# Patient Record
Sex: FEMALE | Race: WHITE | Employment: OTHER | URBAN - METROPOLITAN AREA
[De-identification: names, ages, dates, MRNs, and addresses within clinical notes are randomized per-mention and may not be internally consistent; named-entity substitution may affect disease eponyms.]

---

## 2017-01-06 ENCOUNTER — TELEPHONE (OUTPATIENT)
Dept: INTERNAL MEDICINE | Age: 82
End: 2017-01-06

## 2017-01-10 ENCOUNTER — OFFICE VISIT (OUTPATIENT)
Dept: CARDIOLOGY CLINIC | Age: 82
End: 2017-01-10

## 2017-01-10 VITALS
WEIGHT: 108 LBS | SYSTOLIC BLOOD PRESSURE: 120 MMHG | DIASTOLIC BLOOD PRESSURE: 50 MMHG | HEART RATE: 72 BPM | BODY MASS INDEX: 22.57 KG/M2

## 2017-01-10 DIAGNOSIS — I10 ESSENTIAL HYPERTENSION: ICD-10-CM

## 2017-01-10 DIAGNOSIS — I47.1 SVT (SUPRAVENTRICULAR TACHYCARDIA) (HCC): ICD-10-CM

## 2017-01-10 DIAGNOSIS — I35.1 NONRHEUMATIC AORTIC VALVE INSUFFICIENCY: ICD-10-CM

## 2017-01-10 DIAGNOSIS — I25.10 CAD IN NATIVE ARTERY: Primary | ICD-10-CM

## 2017-01-10 DIAGNOSIS — R00.2 PALPITATIONS: ICD-10-CM

## 2017-01-10 DIAGNOSIS — I34.0 NON-RHEUMATIC MITRAL REGURGITATION: ICD-10-CM

## 2017-01-10 PROCEDURE — 99214 OFFICE O/P EST MOD 30 MIN: CPT | Performed by: INTERNAL MEDICINE

## 2017-01-10 RX ORDER — TRAMADOL HYDROCHLORIDE 50 MG/1
50 TABLET ORAL EVERY 6 HOURS PRN
COMMUNITY
End: 2017-02-15 | Stop reason: SDUPTHER

## 2017-01-25 ENCOUNTER — CARE COORDINATION (OUTPATIENT)
Dept: INTERNAL MEDICINE | Age: 82
End: 2017-01-25

## 2017-02-03 RX ORDER — DIGOXIN 125 MCG
TABLET ORAL
Qty: 30 TABLET | Refills: 6 | Status: SHIPPED | OUTPATIENT
Start: 2017-02-03 | End: 2017-10-09 | Stop reason: SDUPTHER

## 2017-02-15 RX ORDER — TRAMADOL HYDROCHLORIDE 50 MG/1
TABLET ORAL
Qty: 60 TABLET | Refills: 0 | OUTPATIENT
Start: 2017-02-15 | End: 2017-04-24 | Stop reason: SDUPTHER

## 2017-02-28 ENCOUNTER — CARE COORDINATION (OUTPATIENT)
Dept: INTERNAL MEDICINE | Age: 82
End: 2017-02-28

## 2017-03-15 ENCOUNTER — CARE COORDINATION (OUTPATIENT)
Dept: INTERNAL MEDICINE | Age: 82
End: 2017-03-15

## 2017-03-31 RX ORDER — SIMVASTATIN 20 MG
TABLET ORAL
Qty: 30 TABLET | Refills: 3 | Status: SHIPPED | OUTPATIENT
Start: 2017-03-31 | End: 2017-09-01 | Stop reason: SDUPTHER

## 2017-04-03 ENCOUNTER — CARE COORDINATION (OUTPATIENT)
Dept: INTERNAL MEDICINE | Age: 82
End: 2017-04-03

## 2017-04-21 ENCOUNTER — OFFICE VISIT (OUTPATIENT)
Dept: INTERNAL MEDICINE | Age: 82
End: 2017-04-21

## 2017-04-21 VITALS
SYSTOLIC BLOOD PRESSURE: 130 MMHG | DIASTOLIC BLOOD PRESSURE: 68 MMHG | BODY MASS INDEX: 21.62 KG/M2 | HEART RATE: 60 BPM | WEIGHT: 103 LBS | HEIGHT: 58 IN

## 2017-04-21 DIAGNOSIS — H35.30 MACULAR DEGENERATION OF BOTH EYES: ICD-10-CM

## 2017-04-21 DIAGNOSIS — I25.10 CORONARY ARTERY DISEASE INVOLVING NATIVE CORONARY ARTERY OF NATIVE HEART WITHOUT ANGINA PECTORIS: ICD-10-CM

## 2017-04-21 DIAGNOSIS — G47.10 HYPERSOMNOLENCE: ICD-10-CM

## 2017-04-21 DIAGNOSIS — E78.5 HYPERLIPIDEMIA, UNSPECIFIED HYPERLIPIDEMIA TYPE: ICD-10-CM

## 2017-04-21 DIAGNOSIS — I10 ESSENTIAL HYPERTENSION: ICD-10-CM

## 2017-04-21 DIAGNOSIS — R53.83 FATIGUE, UNSPECIFIED TYPE: Primary | ICD-10-CM

## 2017-04-21 DIAGNOSIS — R53.83 FATIGUE, UNSPECIFIED TYPE: ICD-10-CM

## 2017-04-21 LAB
A/G RATIO: 1.9 (ref 1.1–2.2)
ALBUMIN SERPL-MCNC: 4.1 G/DL (ref 3.4–5)
ALP BLD-CCNC: 57 U/L (ref 40–129)
ALT SERPL-CCNC: 11 U/L (ref 10–40)
ANION GAP SERPL CALCULATED.3IONS-SCNC: 16 MMOL/L (ref 3–16)
AST SERPL-CCNC: 17 U/L (ref 15–37)
BASOPHILS ABSOLUTE: 0 K/UL (ref 0–0.2)
BASOPHILS RELATIVE PERCENT: 0.6 %
BILIRUB SERPL-MCNC: <0.2 MG/DL (ref 0–1)
BUN BLDV-MCNC: 17 MG/DL (ref 7–20)
CALCIUM SERPL-MCNC: 9.4 MG/DL (ref 8.3–10.6)
CHLORIDE BLD-SCNC: 100 MMOL/L (ref 99–110)
CHOLESTEROL, TOTAL: 165 MG/DL (ref 0–199)
CO2: 28 MMOL/L (ref 21–32)
CREAT SERPL-MCNC: 0.8 MG/DL (ref 0.6–1.2)
EOSINOPHILS ABSOLUTE: 0.1 K/UL (ref 0–0.6)
EOSINOPHILS RELATIVE PERCENT: 1.1 %
GFR AFRICAN AMERICAN: >60
GFR NON-AFRICAN AMERICAN: >60
GLOBULIN: 2.2 G/DL
GLUCOSE BLD-MCNC: 85 MG/DL (ref 70–99)
HCT VFR BLD CALC: 36.1 % (ref 36–48)
HDLC SERPL-MCNC: 60 MG/DL (ref 40–60)
HEMOGLOBIN: 11.9 G/DL (ref 12–16)
LDL CHOLESTEROL CALCULATED: 64 MG/DL
LYMPHOCYTES ABSOLUTE: 0.8 K/UL (ref 1–5.1)
LYMPHOCYTES RELATIVE PERCENT: 14 %
MCH RBC QN AUTO: 30.4 PG (ref 26–34)
MCHC RBC AUTO-ENTMCNC: 33.1 G/DL (ref 31–36)
MCV RBC AUTO: 91.8 FL (ref 80–100)
MONOCYTES ABSOLUTE: 0.5 K/UL (ref 0–1.3)
MONOCYTES RELATIVE PERCENT: 8.5 %
NEUTROPHILS ABSOLUTE: 4.5 K/UL (ref 1.7–7.7)
NEUTROPHILS RELATIVE PERCENT: 75.8 %
PDW BLD-RTO: 13.8 % (ref 12.4–15.4)
PLATELET # BLD: 245 K/UL (ref 135–450)
PMV BLD AUTO: 9.1 FL (ref 5–10.5)
POTASSIUM SERPL-SCNC: 4.7 MMOL/L (ref 3.5–5.1)
RBC # BLD: 3.93 M/UL (ref 4–5.2)
SODIUM BLD-SCNC: 144 MMOL/L (ref 136–145)
TOTAL PROTEIN: 6.3 G/DL (ref 6.4–8.2)
TRIGL SERPL-MCNC: 207 MG/DL (ref 0–150)
TSH REFLEX FT4: 2 UIU/ML (ref 0.27–4.2)
VLDLC SERPL CALC-MCNC: 41 MG/DL
WBC # BLD: 6 K/UL (ref 4–11)

## 2017-04-21 PROCEDURE — 99214 OFFICE O/P EST MOD 30 MIN: CPT | Performed by: INTERNAL MEDICINE

## 2017-04-21 PROCEDURE — 1090F PRES/ABSN URINE INCON ASSESS: CPT | Performed by: INTERNAL MEDICINE

## 2017-04-21 PROCEDURE — G8419 CALC BMI OUT NRM PARAM NOF/U: HCPCS | Performed by: INTERNAL MEDICINE

## 2017-04-21 PROCEDURE — 4040F PNEUMOC VAC/ADMIN/RCVD: CPT | Performed by: INTERNAL MEDICINE

## 2017-04-21 PROCEDURE — 1123F ACP DISCUSS/DSCN MKR DOCD: CPT | Performed by: INTERNAL MEDICINE

## 2017-04-21 PROCEDURE — G8598 ASA/ANTIPLAT THER USED: HCPCS | Performed by: INTERNAL MEDICINE

## 2017-04-21 PROCEDURE — 1036F TOBACCO NON-USER: CPT | Performed by: INTERNAL MEDICINE

## 2017-04-21 PROCEDURE — G8427 DOCREV CUR MEDS BY ELIG CLIN: HCPCS | Performed by: INTERNAL MEDICINE

## 2017-04-21 ASSESSMENT — ENCOUNTER SYMPTOMS
RESPIRATORY NEGATIVE: 1
BACK PAIN: 0

## 2017-04-24 RX ORDER — TRAMADOL HYDROCHLORIDE 50 MG/1
TABLET ORAL
Qty: 60 TABLET | Refills: 0 | OUTPATIENT
Start: 2017-04-24 | End: 2017-06-29 | Stop reason: SDUPTHER

## 2017-05-10 ENCOUNTER — TELEPHONE (OUTPATIENT)
Dept: INTERNAL MEDICINE | Age: 82
End: 2017-05-10

## 2017-06-14 ENCOUNTER — CARE COORDINATION (OUTPATIENT)
Dept: INTERNAL MEDICINE | Age: 82
End: 2017-06-14

## 2017-06-19 ENCOUNTER — TELEPHONE (OUTPATIENT)
Dept: INTERNAL MEDICINE | Age: 82
End: 2017-06-19

## 2017-06-20 RX ORDER — BENAZEPRIL HYDROCHLORIDE 10 MG/1
TABLET ORAL
Qty: 90 TABLET | Refills: 0 | Status: SHIPPED | OUTPATIENT
Start: 2017-06-20 | End: 2017-12-26 | Stop reason: SDUPTHER

## 2017-06-30 RX ORDER — TRAMADOL HYDROCHLORIDE 50 MG/1
TABLET ORAL
Qty: 60 TABLET | Refills: 0 | OUTPATIENT
Start: 2017-06-30 | End: 2017-09-01 | Stop reason: SDUPTHER

## 2017-07-10 ENCOUNTER — OFFICE VISIT (OUTPATIENT)
Dept: ORTHOPEDIC SURGERY | Age: 82
End: 2017-07-10

## 2017-07-10 VITALS
WEIGHT: 102 LBS | DIASTOLIC BLOOD PRESSURE: 66 MMHG | BODY MASS INDEX: 23.61 KG/M2 | HEIGHT: 55 IN | SYSTOLIC BLOOD PRESSURE: 135 MMHG

## 2017-07-10 DIAGNOSIS — M65.331 TRIGGER MIDDLE FINGER OF RIGHT HAND: ICD-10-CM

## 2017-07-10 DIAGNOSIS — M79.644 FINGER PAIN, RIGHT: Primary | ICD-10-CM

## 2017-07-10 PROCEDURE — 73140 X-RAY EXAM OF FINGER(S): CPT | Performed by: ORTHOPAEDIC SURGERY

## 2017-07-10 PROCEDURE — 4040F PNEUMOC VAC/ADMIN/RCVD: CPT | Performed by: ORTHOPAEDIC SURGERY

## 2017-07-10 PROCEDURE — G8419 CALC BMI OUT NRM PARAM NOF/U: HCPCS | Performed by: ORTHOPAEDIC SURGERY

## 2017-07-10 PROCEDURE — 1090F PRES/ABSN URINE INCON ASSESS: CPT | Performed by: ORTHOPAEDIC SURGERY

## 2017-07-10 PROCEDURE — G8598 ASA/ANTIPLAT THER USED: HCPCS | Performed by: ORTHOPAEDIC SURGERY

## 2017-07-10 PROCEDURE — 20550 NJX 1 TENDON SHEATH/LIGAMENT: CPT | Performed by: ORTHOPAEDIC SURGERY

## 2017-07-10 PROCEDURE — 1123F ACP DISCUSS/DSCN MKR DOCD: CPT | Performed by: ORTHOPAEDIC SURGERY

## 2017-07-10 PROCEDURE — G8427 DOCREV CUR MEDS BY ELIG CLIN: HCPCS | Performed by: ORTHOPAEDIC SURGERY

## 2017-07-10 PROCEDURE — 99203 OFFICE O/P NEW LOW 30 MIN: CPT | Performed by: ORTHOPAEDIC SURGERY

## 2017-07-10 PROCEDURE — 1036F TOBACCO NON-USER: CPT | Performed by: ORTHOPAEDIC SURGERY

## 2017-07-12 ENCOUNTER — TELEPHONE (OUTPATIENT)
Dept: INTERNAL MEDICINE | Age: 82
End: 2017-07-12

## 2017-07-17 ENCOUNTER — OFFICE VISIT (OUTPATIENT)
Dept: INTERNAL MEDICINE | Age: 82
End: 2017-07-17

## 2017-07-17 VITALS
SYSTOLIC BLOOD PRESSURE: 130 MMHG | WEIGHT: 101 LBS | HEART RATE: 56 BPM | BODY MASS INDEX: 23.37 KG/M2 | DIASTOLIC BLOOD PRESSURE: 60 MMHG | HEIGHT: 55 IN

## 2017-07-17 DIAGNOSIS — I10 ESSENTIAL HYPERTENSION: ICD-10-CM

## 2017-07-17 DIAGNOSIS — G47.10 HYPERSOMNOLENCE: ICD-10-CM

## 2017-07-17 DIAGNOSIS — H35.30 MACULAR DEGENERATION OF BOTH EYES: ICD-10-CM

## 2017-07-17 DIAGNOSIS — I25.10 CORONARY ARTERY DISEASE INVOLVING NATIVE CORONARY ARTERY OF NATIVE HEART WITHOUT ANGINA PECTORIS: ICD-10-CM

## 2017-07-17 DIAGNOSIS — R41.3 MEMORY DIFFICULTIES: ICD-10-CM

## 2017-07-17 PROCEDURE — G8427 DOCREV CUR MEDS BY ELIG CLIN: HCPCS | Performed by: INTERNAL MEDICINE

## 2017-07-17 PROCEDURE — 1036F TOBACCO NON-USER: CPT | Performed by: INTERNAL MEDICINE

## 2017-07-17 PROCEDURE — 1090F PRES/ABSN URINE INCON ASSESS: CPT | Performed by: INTERNAL MEDICINE

## 2017-07-17 PROCEDURE — G8598 ASA/ANTIPLAT THER USED: HCPCS | Performed by: INTERNAL MEDICINE

## 2017-07-17 PROCEDURE — G8417 CALC BMI ABV UP PARAM F/U: HCPCS | Performed by: INTERNAL MEDICINE

## 2017-07-17 PROCEDURE — 1123F ACP DISCUSS/DSCN MKR DOCD: CPT | Performed by: INTERNAL MEDICINE

## 2017-07-17 PROCEDURE — 4040F PNEUMOC VAC/ADMIN/RCVD: CPT | Performed by: INTERNAL MEDICINE

## 2017-07-17 PROCEDURE — 99215 OFFICE O/P EST HI 40 MIN: CPT | Performed by: INTERNAL MEDICINE

## 2017-07-17 RX ORDER — DONEPEZIL HYDROCHLORIDE 10 MG/1
10 TABLET, FILM COATED ORAL NIGHTLY
Qty: 30 TABLET | Refills: 5 | Status: SHIPPED | OUTPATIENT
Start: 2017-07-17 | End: 2017-07-17 | Stop reason: SDUPTHER

## 2017-07-17 RX ORDER — DONEPEZIL HYDROCHLORIDE 10 MG/1
10 TABLET, FILM COATED ORAL NIGHTLY
Qty: 30 TABLET | Refills: 5 | Status: SHIPPED | OUTPATIENT
Start: 2017-07-17 | End: 2018-07-02

## 2017-07-17 ASSESSMENT — ENCOUNTER SYMPTOMS
SHORTNESS OF BREATH: 0
TROUBLE SWALLOWING: 0
GASTROINTESTINAL NEGATIVE: 1

## 2017-07-21 ENCOUNTER — OFFICE VISIT (OUTPATIENT)
Dept: CARDIOLOGY CLINIC | Age: 82
End: 2017-07-21

## 2017-07-21 VITALS
BODY MASS INDEX: 31.13 KG/M2 | DIASTOLIC BLOOD PRESSURE: 50 MMHG | WEIGHT: 102 LBS | HEART RATE: 56 BPM | SYSTOLIC BLOOD PRESSURE: 110 MMHG

## 2017-07-21 DIAGNOSIS — I34.0 NON-RHEUMATIC MITRAL REGURGITATION: ICD-10-CM

## 2017-07-21 DIAGNOSIS — I10 ESSENTIAL HYPERTENSION: ICD-10-CM

## 2017-07-21 DIAGNOSIS — I47.1 SVT (SUPRAVENTRICULAR TACHYCARDIA) (HCC): ICD-10-CM

## 2017-07-21 DIAGNOSIS — I25.10 CAD IN NATIVE ARTERY: Primary | ICD-10-CM

## 2017-07-21 DIAGNOSIS — I35.1 NONRHEUMATIC AORTIC VALVE INSUFFICIENCY: ICD-10-CM

## 2017-07-21 DIAGNOSIS — R00.2 PALPITATIONS: ICD-10-CM

## 2017-07-21 PROCEDURE — 1036F TOBACCO NON-USER: CPT | Performed by: INTERNAL MEDICINE

## 2017-07-21 PROCEDURE — 4040F PNEUMOC VAC/ADMIN/RCVD: CPT | Performed by: INTERNAL MEDICINE

## 2017-07-21 PROCEDURE — 99214 OFFICE O/P EST MOD 30 MIN: CPT | Performed by: INTERNAL MEDICINE

## 2017-07-21 PROCEDURE — 1123F ACP DISCUSS/DSCN MKR DOCD: CPT | Performed by: INTERNAL MEDICINE

## 2017-07-21 PROCEDURE — 1090F PRES/ABSN URINE INCON ASSESS: CPT | Performed by: INTERNAL MEDICINE

## 2017-07-21 PROCEDURE — G8417 CALC BMI ABV UP PARAM F/U: HCPCS | Performed by: INTERNAL MEDICINE

## 2017-07-21 PROCEDURE — G8427 DOCREV CUR MEDS BY ELIG CLIN: HCPCS | Performed by: INTERNAL MEDICINE

## 2017-07-21 PROCEDURE — G8598 ASA/ANTIPLAT THER USED: HCPCS | Performed by: INTERNAL MEDICINE

## 2017-09-02 RX ORDER — SIMVASTATIN 20 MG
TABLET ORAL
Qty: 30 TABLET | Refills: 5 | Status: SHIPPED | OUTPATIENT
Start: 2017-09-02 | End: 2017-10-06 | Stop reason: SDUPTHER

## 2017-09-05 RX ORDER — TRAMADOL HYDROCHLORIDE 50 MG/1
TABLET ORAL
Qty: 60 TABLET | Refills: 0 | OUTPATIENT
Start: 2017-09-05 | End: 2017-11-09 | Stop reason: SDUPTHER

## 2017-09-29 ENCOUNTER — OFFICE VISIT (OUTPATIENT)
Dept: INTERNAL MEDICINE | Age: 82
End: 2017-09-29

## 2017-09-29 VITALS
DIASTOLIC BLOOD PRESSURE: 60 MMHG | BODY MASS INDEX: 24.07 KG/M2 | SYSTOLIC BLOOD PRESSURE: 118 MMHG | HEART RATE: 66 BPM | OXYGEN SATURATION: 95 % | WEIGHT: 104 LBS | HEIGHT: 55 IN

## 2017-09-29 DIAGNOSIS — I10 ESSENTIAL HYPERTENSION: ICD-10-CM

## 2017-09-29 DIAGNOSIS — H35.30 MACULAR DEGENERATION OF BOTH EYES: ICD-10-CM

## 2017-09-29 DIAGNOSIS — R41.3 MEMORY DIFFICULTIES: ICD-10-CM

## 2017-09-29 PROCEDURE — G8427 DOCREV CUR MEDS BY ELIG CLIN: HCPCS | Performed by: INTERNAL MEDICINE

## 2017-09-29 PROCEDURE — G8417 CALC BMI ABV UP PARAM F/U: HCPCS | Performed by: INTERNAL MEDICINE

## 2017-09-29 PROCEDURE — G8598 ASA/ANTIPLAT THER USED: HCPCS | Performed by: INTERNAL MEDICINE

## 2017-09-29 PROCEDURE — 4040F PNEUMOC VAC/ADMIN/RCVD: CPT | Performed by: INTERNAL MEDICINE

## 2017-09-29 PROCEDURE — 1123F ACP DISCUSS/DSCN MKR DOCD: CPT | Performed by: INTERNAL MEDICINE

## 2017-09-29 PROCEDURE — 1036F TOBACCO NON-USER: CPT | Performed by: INTERNAL MEDICINE

## 2017-09-29 PROCEDURE — 99214 OFFICE O/P EST MOD 30 MIN: CPT | Performed by: INTERNAL MEDICINE

## 2017-09-29 PROCEDURE — 1090F PRES/ABSN URINE INCON ASSESS: CPT | Performed by: INTERNAL MEDICINE

## 2017-09-29 ASSESSMENT — ENCOUNTER SYMPTOMS
GASTROINTESTINAL NEGATIVE: 1
RESPIRATORY NEGATIVE: 1

## 2017-09-29 ASSESSMENT — PATIENT HEALTH QUESTIONNAIRE - PHQ9
SUM OF ALL RESPONSES TO PHQ QUESTIONS 1-9: 0
SUM OF ALL RESPONSES TO PHQ9 QUESTIONS 1 & 2: 0
1. LITTLE INTEREST OR PLEASURE IN DOING THINGS: 0
2. FEELING DOWN, DEPRESSED OR HOPELESS: 0

## 2017-10-12 RX ORDER — DIGOXIN 125 MCG
TABLET ORAL
Qty: 30 TABLET | Refills: 6 | Status: ON HOLD | OUTPATIENT
Start: 2017-10-12 | End: 2018-02-09 | Stop reason: HOSPADM

## 2017-11-10 RX ORDER — TRAMADOL HYDROCHLORIDE 50 MG/1
TABLET ORAL
Qty: 60 TABLET | Refills: 0 | OUTPATIENT
Start: 2017-11-10 | End: 2018-01-18 | Stop reason: SDUPTHER

## 2017-12-11 ENCOUNTER — HOSPITAL ENCOUNTER (OUTPATIENT)
Dept: ULTRASOUND IMAGING | Age: 82
Discharge: HOME | End: 2017-12-11
Attending: UROLOGY | Admitting: UROLOGY

## 2017-12-11 DIAGNOSIS — N23 RENAL COLIC: ICD-10-CM

## 2017-12-26 RX ORDER — BENAZEPRIL HYDROCHLORIDE 10 MG/1
TABLET ORAL
Qty: 90 TABLET | Refills: 0 | Status: SHIPPED | OUTPATIENT
Start: 2017-12-26 | End: 2018-03-27 | Stop reason: SDUPTHER

## 2018-01-18 DIAGNOSIS — M41.9 SCOLIOSIS OF THORACOLUMBAR SPINE, UNSPECIFIED SCOLIOSIS TYPE: Primary | ICD-10-CM

## 2018-01-19 RX ORDER — TRAMADOL HYDROCHLORIDE 50 MG/1
TABLET ORAL
Qty: 60 TABLET | Refills: 0 | Status: ON HOLD | OUTPATIENT
Start: 2018-01-19 | End: 2018-02-09 | Stop reason: HOSPADM

## 2018-02-08 PROBLEM — I21.4 NSTEMI (NON-ST ELEVATED MYOCARDIAL INFARCTION) (HCC): Status: ACTIVE | Noted: 2018-02-08

## 2018-02-09 ENCOUNTER — TELEPHONE (OUTPATIENT)
Dept: CARDIOLOGY CLINIC | Age: 83
End: 2018-02-09

## 2018-02-09 ENCOUNTER — TELEPHONE (OUTPATIENT)
Dept: INTERNAL MEDICINE | Age: 83
End: 2018-02-09

## 2018-02-09 DIAGNOSIS — I21.4 NSTEMI (NON-ST ELEVATED MYOCARDIAL INFARCTION) (HCC): Primary | ICD-10-CM

## 2018-02-09 NOTE — TELEPHONE ENCOUNTER
[facility] needs orders sent over for Home Healthcare. Pt was just D/C today from recent MI. Per [facility], pt would benefit from PT, med reconciliation, and disease process. Please call [phone] and/o fax orders to [phone]. Please advise.

## 2018-02-09 NOTE — TELEPHONE ENCOUNTER
Joyce Vera calling  From [facility] and can be reached at   Requesting to get a transitional Care f/u due to pt being d/c  2.9.18 with Yasemin Barnes MD pt needs to be seen Mon ,thursday and Friday afternoon due to transportation next week   Aware that Yasemin Barnes MD out however, message will be sent   Please call pt at  [phone]

## 2018-02-10 ENCOUNTER — CARE COORDINATION (OUTPATIENT)
Dept: CASE MANAGEMENT | Age: 83
End: 2018-02-10

## 2018-02-10 NOTE — CARE COORDINATION
Pharmacy delivered her meds yesterday and her private duty aide, Nano Marquez, has spoken to [employer] and is coming over to help get her meds organized. She commented about having a medbox to use now and that Nano Marquez will set it up for her. She has not taken any meds yet today until organized. Asked if Nano Marquez can call CTN when she arrives, but she ws still in bed and couldn't write down the phone number which is different than the number CTN calling from. Noted pt has orders for [facility]. Pt knows Saiaron Reny from [facility] and thought she would be out on Monday. CTN called [facility]. Spoke with Antionette. She said nurse is scheduled to see pt today. Advised she needs help with her meds. CTN called PCP office to schedule f/u appt yesterday but pt PCP and MA were out yesterday and message was left for them to contact pt on Monday for f/u appt. Pt agreeable to care [facility] calls. Follow Up  Future Appointments  Date Time Provider Lucille Herrera   3/28/2018 1:45 PM Jose Jennings MD House Card [facility] Nadeem Chisholm [place] Transition Coordinator  [phone]  [email or web address]

## 2018-02-12 ENCOUNTER — TELEPHONE (OUTPATIENT)
Dept: INTERNAL MEDICINE | Age: 83
End: 2018-02-12

## 2018-02-13 ENCOUNTER — CARE COORDINATION (OUTPATIENT)
Dept: CASE MANAGEMENT | Age: 83
End: 2018-02-13

## 2018-02-15 ENCOUNTER — TELEPHONE (OUTPATIENT)
Dept: INTERNAL MEDICINE | Age: 83
End: 2018-02-15

## 2018-02-16 RX ORDER — FLUCONAZOLE 100 MG/1
TABLET ORAL
Qty: 5 TABLET | Refills: 0 | Status: SHIPPED | OUTPATIENT
Start: 2018-02-16 | End: 2018-03-06

## 2018-02-19 ENCOUNTER — CARE COORDINATION (OUTPATIENT)
Dept: CASE MANAGEMENT | Age: 83
End: 2018-02-19

## 2018-02-22 ENCOUNTER — OFFICE VISIT (OUTPATIENT)
Dept: CARDIOLOGY CLINIC | Age: 83
End: 2018-02-22

## 2018-02-22 VITALS
DIASTOLIC BLOOD PRESSURE: 58 MMHG | WEIGHT: 97.6 LBS | BODY MASS INDEX: 29.78 KG/M2 | SYSTOLIC BLOOD PRESSURE: 104 MMHG | HEART RATE: 70 BPM

## 2018-02-22 DIAGNOSIS — I10 ESSENTIAL HYPERTENSION: ICD-10-CM

## 2018-02-22 DIAGNOSIS — I21.4 NSTEMI (NON-ST ELEVATED MYOCARDIAL INFARCTION) (HCC): ICD-10-CM

## 2018-02-22 DIAGNOSIS — I25.10 CORONARY ARTERY DISEASE INVOLVING NATIVE CORONARY ARTERY OF NATIVE HEART WITHOUT ANGINA PECTORIS: Primary | ICD-10-CM

## 2018-02-22 DIAGNOSIS — R00.1 SINUS BRADYCARDIA: ICD-10-CM

## 2018-02-22 DIAGNOSIS — R53.83 FATIGUE, UNSPECIFIED TYPE: ICD-10-CM

## 2018-02-22 PROCEDURE — 1123F ACP DISCUSS/DSCN MKR DOCD: CPT | Performed by: NURSE PRACTITIONER

## 2018-02-22 PROCEDURE — G8427 DOCREV CUR MEDS BY ELIG CLIN: HCPCS | Performed by: NURSE PRACTITIONER

## 2018-02-22 PROCEDURE — 4040F PNEUMOC VAC/ADMIN/RCVD: CPT | Performed by: NURSE PRACTITIONER

## 2018-02-22 PROCEDURE — 1036F TOBACCO NON-USER: CPT | Performed by: NURSE PRACTITIONER

## 2018-02-22 PROCEDURE — G8417 CALC BMI ABV UP PARAM F/U: HCPCS | Performed by: NURSE PRACTITIONER

## 2018-02-22 PROCEDURE — 1090F PRES/ABSN URINE INCON ASSESS: CPT | Performed by: NURSE PRACTITIONER

## 2018-02-22 PROCEDURE — 99213 OFFICE O/P EST LOW 20 MIN: CPT | Performed by: NURSE PRACTITIONER

## 2018-02-22 PROCEDURE — G8598 ASA/ANTIPLAT THER USED: HCPCS | Performed by: NURSE PRACTITIONER

## 2018-02-22 PROCEDURE — G8484 FLU IMMUNIZE NO ADMIN: HCPCS | Performed by: NURSE PRACTITIONER

## 2018-02-22 PROCEDURE — 1111F DSCHRG MED/CURRENT MED MERGE: CPT | Performed by: NURSE PRACTITIONER

## 2018-02-22 ASSESSMENT — ENCOUNTER SYMPTOMS
SHORTNESS OF BREATH: 1
COUGH: 0
GASTROINTESTINAL NEGATIVE: 1
WHEEZING: 0

## 2018-02-22 NOTE — PATIENT INSTRUCTIONS
1. Medications: continue current medications  2. Follow up: 6 months with Dr. Milena Mahajan  Patient Education        Angina: Care Instructions  Your Care Instructions    You have a problem called angina. Angina happens when there is not enough blood flow to your heart muscle. Angina is a sign of coronary artery disease (CAD). CAD occurs when blood vessels that supply the heart become narrowed. Having CAD increases your risk of a heart attack. Chest pain or pressure is the most common symptom of angina. But some people have other symptoms, like:  Pain, pressure, or a strange feeling in the back, neck, jaw, or upper belly, or in one or both shoulders or arms. Shortness of breath. Nausea or vomiting. Lightheadedness or sudden weakness. Fast or irregular heartbeat. Women are somewhat more likely than men to have angina symptoms like shortness of breath, nausea, and back or jaw pain. Angina can be dangerous. That's why it is important to pay attention to your symptoms. Know what is typical for you, learn how to control your symptoms, and understand when you need to get treatment. A change in your usual pattern of symptoms is an emergency. It may mean that you are having a heart attack. The doctor has checked you carefully, but problems can develop later. If you notice any problems or new symptoms, get medical treatment right away. Follow-up care is a key part of your treatment and safety. Be sure to make and go to all appointments, and call your doctor if you are having problems. It's also a good idea to know your test results and keep a list of the medicines you take. How can you care for yourself at home? Medicines  ? If your doctor has given you nitroglycerin for angina symptoms, keep it with you at all times. If you have symptoms, sit down and rest, and take the first dose of nitroglycerin as directed. If your symptoms get worse or are not getting better within 5 minutes, call 911 right away. Stay on the phone. changes in your health, and be sure to contact your doctor if you have any problems. Where can you learn more? Go to [email or web address] and sign in to your [employer] account. Enter H129 in the Varolii box to learn more about \"Angina: Care Instructions. \"     If you do not have an account, please click on the \"Sign Up Now\" link. Current as of: September 21, 2016  Content Version: 11.5  © 5131-2421 [employer]wise, Incorporated. Care instructions adapted under license by [facility] ([facility]). If you have questions about a medical condition or this instruction, always ask your healthcare professional. [address] any warranty or liability for your use of this information.

## 2018-02-22 NOTE — PROGRESS NOTES
[address]      Primary Care Doctor:  Mariluz Paniagua MD    Chief Complaint:  fatigue    History of Present Illness:  Jaswant Mac is a 80 y.o. female with PMH CAD, DVT, HTN, HLD who presents today for hospital f/u. She was admitted 18 with MI and SB and d/c 18. She c/o fatigue but denies any chest pain, jaw pain, dyspnea, orthopnea, PND, exertional chest pressure/discomfort, edema, syncope  She is losing weight and states she has no appetite     Cardiac Risk Factors  Family Hx:  Yes  Tobacco:  No  HTN:   controlled  Lipids 18 LDL: 42 goal< 100  HDL:  58 T  DM:  No  Metabolic Syndrome: No    Fasting BG>100  No HDL<40/50  No TG>150  No BP>130/85  No Abd Circ>35in/40in  No      EF: 55%    NYHA Class:   II   Class I   Patients with no limitation of activities; they suffer no symptoms from ordinary activities. Class II   Patients with slight, mild limitation of activity; they are comfortable with rest or with mild exertion. Class III   Patients with marked limitation of activity; they are comfortable only at rest.   Class IV   Patients who should be at complete rest, confined to bed or chair; any physical activity brings on discomfort and symptoms occur at rest.      Activity: below baseline  Can you walk 1-2 blocks or do a moderate amount of house/yard work? No          Past Medical History:   has a past medical history of CAD (coronary artery disease); Hiatal hernia; Hyperlipidemia; Hypertension; and Macular degeneration of both eyes. Surgical History:   has a past surgical history that includes Diagnostic Cardiac Cath Lab Procedure; Cholecystectomy; Appendectomy; Tonsillectomy and adenoidectomy; joint replacement; bladder repair (Bladder tuck); bladder suspension (); cardiovascular stress test (); and doppler echocardiography (10/13). Social History:   reports that she has quit smoking.  She has never used smokeless tobacco. She reports that she does not drink alcohol or time. She appears well-developed. HENT:   Head: Normocephalic and atraumatic. Eyes: Conjunctivae are normal. Pupils are equal, round, and reactive to light. Neck: Normal range of motion. Neck supple. Cardiovascular: Normal rate and intact distal pulses. Murmur heard. Musculoskeletal: She exhibits edema. Trace edema bilaterally   Neurological: She is alert and oriented to person, place, and time. Skin: Skin is warm and dry. Psychiatric: She has a normal mood and affect. Her behavior is normal. Judgment and thought content normal.       Lab Data:    CBC:   Lab Results   Component Value Date    WBC 4.9 02/08/2018    WBC 4.4 02/07/2018    WBC 7.8 12/13/2017    RBC 3.82 02/08/2018    RBC 3.93 02/07/2018    RBC 3.83 12/13/2017    HGB 11.6 02/08/2018    HGB 12.0 02/07/2018    HGB 11.8 12/13/2017    HCT 34.2 02/08/2018    HCT 35.5 02/07/2018    HCT 33.8 12/13/2017    MCV 89.5 02/08/2018    MCV 90.3 02/07/2018    MCV 88.4 12/13/2017    RDW 13.8 02/08/2018    RDW 14.3 02/07/2018    RDW 13.1 12/13/2017     02/08/2018     02/07/2018     12/13/2017     BMP:  Lab Results   Component Value Date     02/07/2018     12/13/2017     04/21/2017    K 4.2 02/07/2018    K 4.5 12/13/2017    K 4.7 04/21/2017    CL 95 02/07/2018    CL 95 12/13/2017     04/21/2017    CO2 27 02/07/2018    CO2 30 12/13/2017    CO2 28 04/21/2017    BUN 22 02/07/2018    BUN 15 12/13/2017    BUN 17 04/21/2017    CREATININE 0.8 02/07/2018    CREATININE 0.8 12/13/2017    CREATININE 0.8 04/21/2017     BNP:   Lab Results   Component Value Date    PROBNP 1,446 02/07/2018        Cardiac Imaging: Echo 2/8/18  Normal left ventricle size, wall thickness and systolic function with an   estimated ejection fraction of 55%. No regional wall motion abnormalities are seen. Diastolic filling parameters suggests grade I diastolic dysfunction .    The mitral valve leaflets are slightly thickened with normal leaflet

## 2018-02-26 ENCOUNTER — CARE COORDINATION (OUTPATIENT)
Dept: CASE MANAGEMENT | Age: 83
End: 2018-02-26

## 2018-03-06 ENCOUNTER — OFFICE VISIT (OUTPATIENT)
Dept: INTERNAL MEDICINE | Age: 83
End: 2018-03-06

## 2018-03-06 VITALS
HEART RATE: 64 BPM | BODY MASS INDEX: 22.68 KG/M2 | HEIGHT: 55 IN | WEIGHT: 98 LBS | SYSTOLIC BLOOD PRESSURE: 138 MMHG | DIASTOLIC BLOOD PRESSURE: 60 MMHG

## 2018-03-06 DIAGNOSIS — R41.3 MEMORY DIFFICULTIES: ICD-10-CM

## 2018-03-06 DIAGNOSIS — I25.10 CORONARY ARTERY DISEASE INVOLVING NATIVE CORONARY ARTERY OF NATIVE HEART WITHOUT ANGINA PECTORIS: ICD-10-CM

## 2018-03-06 DIAGNOSIS — F33.42 RECURRENT MAJOR DEPRESSIVE EPISODES, IN FULL REMISSION (HCC): ICD-10-CM

## 2018-03-06 DIAGNOSIS — Z00.00 PREVENTATIVE HEALTH CARE: Primary | ICD-10-CM

## 2018-03-06 DIAGNOSIS — I35.1 NONRHEUMATIC AORTIC VALVE INSUFFICIENCY: ICD-10-CM

## 2018-03-06 DIAGNOSIS — E78.5 HYPERLIPIDEMIA, UNSPECIFIED HYPERLIPIDEMIA TYPE: ICD-10-CM

## 2018-03-06 DIAGNOSIS — I21.4 NSTEMI (NON-ST ELEVATED MYOCARDIAL INFARCTION) (HCC): ICD-10-CM

## 2018-03-06 DIAGNOSIS — I10 ESSENTIAL HYPERTENSION: ICD-10-CM

## 2018-03-06 PROCEDURE — 1111F DSCHRG MED/CURRENT MED MERGE: CPT | Performed by: INTERNAL MEDICINE

## 2018-03-06 PROCEDURE — G0439 PPPS, SUBSEQ VISIT: HCPCS | Performed by: INTERNAL MEDICINE

## 2018-03-06 PROCEDURE — G8484 FLU IMMUNIZE NO ADMIN: HCPCS | Performed by: INTERNAL MEDICINE

## 2018-03-06 PROCEDURE — G8427 DOCREV CUR MEDS BY ELIG CLIN: HCPCS | Performed by: INTERNAL MEDICINE

## 2018-03-06 PROCEDURE — 1036F TOBACCO NON-USER: CPT | Performed by: INTERNAL MEDICINE

## 2018-03-06 PROCEDURE — 99215 OFFICE O/P EST HI 40 MIN: CPT | Performed by: INTERNAL MEDICINE

## 2018-03-06 PROCEDURE — G8598 ASA/ANTIPLAT THER USED: HCPCS | Performed by: INTERNAL MEDICINE

## 2018-03-06 PROCEDURE — 1123F ACP DISCUSS/DSCN MKR DOCD: CPT | Performed by: INTERNAL MEDICINE

## 2018-03-06 PROCEDURE — 4040F PNEUMOC VAC/ADMIN/RCVD: CPT | Performed by: INTERNAL MEDICINE

## 2018-03-06 PROCEDURE — 1090F PRES/ABSN URINE INCON ASSESS: CPT | Performed by: INTERNAL MEDICINE

## 2018-03-06 PROCEDURE — G8417 CALC BMI ABV UP PARAM F/U: HCPCS | Performed by: INTERNAL MEDICINE

## 2018-03-06 ASSESSMENT — ENCOUNTER SYMPTOMS
GASTROINTESTINAL NEGATIVE: 1
RESPIRATORY NEGATIVE: 1
TROUBLE SWALLOWING: 0

## 2018-03-06 NOTE — PROGRESS NOTES
SUBJECTIVE:    Patient ID: Graham Goodrich is an 80 y.o. female. HPI: Patient here today for the f/u of chronic problems -- see Problem List and associated comments. New issues or complaints include (also see Assessment for more details):  Patient here for routine follow-up. She is in the hospital one month ago with an non-ST elevated MI. She is pain-free now. She is back to assisted living. She knows she has some memory issues. Her appetite is fair. She still gets around and participates in activities. Does not feel short of breath. No GI issues. Mild arthritis symptoms. Overall given her age she is still fairly functional and enjoying what she is doing. Review of Systems   Constitutional: Positive for appetite change and fatigue. Negative for activity change and diaphoresis. HENT: Negative for trouble swallowing. Eyes: Negative for visual disturbance. Respiratory: Negative. Cardiovascular: Negative for chest pain and palpitations. Gastrointestinal: Negative. Genitourinary: Negative for dysuria. Musculoskeletal: Positive for arthralgias. Skin: Negative for rash. Neurological: Negative for tremors and headaches. Psychiatric/Behavioral: Negative for dysphoric mood. The patient is not nervous/anxious. Memory issues       OBJECTIVE:    /60 (Site: Left Arm, Position: Sitting, Cuff Size: Medium Adult)   Pulse 64   Ht 4' (1.219 m)   Wt 98 lb (44.5 kg)   BMI 29.91 kg/m²      Physical Exam   Constitutional: She is oriented to person, place, and time. She appears well-developed and well-nourished. No distress. Using walker   HENT:   Head: Normocephalic. Right Ear: External ear normal.   Left Ear: External ear normal.   Eyes: Right eye exhibits no discharge. Left eye exhibits no discharge. No scleral icterus. Neck: No JVD present. Carotid bruit is not present. Cardiovascular: Normal rate and regular rhythm. Exam reveals no gallop. Murmur heard.    Systolic murmur is present with a grade of 2/6   Pulses:       Carotid pulses are 2+ on the right side, and 2+ on the left side. Radial pulses are 2+ on the right side, and 2+ on the left side. Pulmonary/Chest: Effort normal and breath sounds normal. No stridor. No respiratory distress. She has no wheezes. Abdominal: Soft. Bowel sounds are normal. She exhibits no distension and no abdominal bruit. There is no tenderness. Musculoskeletal: She exhibits no edema. Kyphoscoliosis of back   Lymphadenopathy:        Head (right side): No submandibular adenopathy present. Head (left side): No submandibular adenopathy present. She has no cervical adenopathy. Right: No supraclavicular adenopathy present. Left: No supraclavicular adenopathy present. Neurological: She is alert and oriented to person, place, and time. She has normal strength. She displays no tremor. No cranial nerve deficit. Skin: She is not diaphoretic. No pallor. Psychiatric: Her speech is normal. Her mood appears not anxious. She is not agitated, not aggressive, not withdrawn and not actively hallucinating. Thought content is not delusional. Cognition and memory are impaired. She exhibits abnormal recent memory. ASSESSMENT:       Encounter Diagnoses   Name Primary?  Recurrent major depressive episodes, in full remission ([facility] [address]     Preventative health care     NSTEMI (non-ST elevated myocardial infarction) ([facility] [address]     Memory difficulties     Hyperlipidemia, unspecified hyperlipidemia type     Coronary artery disease involving native coronary artery of native heart without angina pectoris     Essential hypertension     Nonrheumatic aortic valve insufficiency        Preventative health care  Status post hospitalization for NSTEMI. Hospital chart reviewed. Patient only has vague recall of this. She is back living at the [facility]. She says she feels fine. She notes her memory has issues. Appetite is not great.  Overall she is happy and content with how she feels. Labs from hospitalization reviewed. No further labs at this time. NSTEMI (non-ST elevated myocardial infarction) [facility])  Approximately one month agohospital notes reviewed. Medication changes noted. Patient denies any chest pain or jaw pain. She feels well. Memory difficulties  Nose bone exam. Patient admits to memory issues. She remains relatively functional at her assisted living. She is happy. Continue the Aricept forever benefits and offers. Hyperlipidemia  Continue statin    CAD (coronary artery disease)  Status post NSTEMI. No further chest pains. EF 50-55% on echo. Now on ASA and Plavix. Essential hypertension  BP OK    Nonrheumatic aortic valve insufficiency  Noted on echocardiogram while in hospital.         PLAN:  See ASSESSMENT for evaluation & PLAN     No orders of the defined types were placed in this encounter. PSH, PMH, SH and FH reviewed and noted. Recent and past labs, tests and consults also reviewed. Recent or new meds also reviewed.

## 2018-03-27 RX ORDER — BENAZEPRIL HYDROCHLORIDE 10 MG/1
TABLET ORAL
Qty: 90 TABLET | Refills: 4 | Status: SHIPPED | OUTPATIENT
Start: 2018-03-27 | End: 2018-05-24 | Stop reason: SDUPTHER

## 2018-03-28 ENCOUNTER — OFFICE VISIT (OUTPATIENT)
Dept: CARDIOLOGY CLINIC | Age: 83
End: 2018-03-28

## 2018-03-28 VITALS
BODY MASS INDEX: 30.64 KG/M2 | SYSTOLIC BLOOD PRESSURE: 110 MMHG | HEART RATE: 60 BPM | WEIGHT: 100.4 LBS | DIASTOLIC BLOOD PRESSURE: 42 MMHG

## 2018-03-28 DIAGNOSIS — I47.1 SVT (SUPRAVENTRICULAR TACHYCARDIA) (HCC): ICD-10-CM

## 2018-03-28 DIAGNOSIS — I25.10 CAD IN NATIVE ARTERY: Primary | ICD-10-CM

## 2018-03-28 DIAGNOSIS — I10 ESSENTIAL HYPERTENSION: ICD-10-CM

## 2018-03-28 DIAGNOSIS — I34.0 NON-RHEUMATIC MITRAL REGURGITATION: ICD-10-CM

## 2018-03-28 DIAGNOSIS — R00.2 PALPITATIONS: ICD-10-CM

## 2018-03-28 DIAGNOSIS — I35.1 NONRHEUMATIC AORTIC VALVE INSUFFICIENCY: ICD-10-CM

## 2018-03-28 PROCEDURE — G8427 DOCREV CUR MEDS BY ELIG CLIN: HCPCS | Performed by: INTERNAL MEDICINE

## 2018-03-28 PROCEDURE — G8598 ASA/ANTIPLAT THER USED: HCPCS | Performed by: INTERNAL MEDICINE

## 2018-03-28 PROCEDURE — 4040F PNEUMOC VAC/ADMIN/RCVD: CPT | Performed by: INTERNAL MEDICINE

## 2018-03-28 PROCEDURE — 1090F PRES/ABSN URINE INCON ASSESS: CPT | Performed by: INTERNAL MEDICINE

## 2018-03-28 PROCEDURE — 1123F ACP DISCUSS/DSCN MKR DOCD: CPT | Performed by: INTERNAL MEDICINE

## 2018-03-28 PROCEDURE — 1036F TOBACCO NON-USER: CPT | Performed by: INTERNAL MEDICINE

## 2018-03-28 PROCEDURE — G8417 CALC BMI ABV UP PARAM F/U: HCPCS | Performed by: INTERNAL MEDICINE

## 2018-03-28 PROCEDURE — 99214 OFFICE O/P EST MOD 30 MIN: CPT | Performed by: INTERNAL MEDICINE

## 2018-03-28 PROCEDURE — G8484 FLU IMMUNIZE NO ADMIN: HCPCS | Performed by: INTERNAL MEDICINE

## 2018-03-28 NOTE — PROGRESS NOTES
wheezes. She has no rales. Abdominal: Soft. Bowel sounds are normal. No tenderness. Musculoskeletal: Normal range of motion. She exhibits tr edema. Neurological: She is alert and oriented to person, place, and time. Skin: Skin is warm and dry. Psychiatric: She has a normal mood and affect. Her behavior is normal. Thought content normal.       Assessment:      1. CAD in native artery     2. Essential hypertension     3. SVT (supraventricular tachycardia) (HCC)     4. Palpitations     5. Nonrheumatic aortic valve insufficiency     6. Non-rheumatic mitral regurgitation             Plan:      CV stable. Doing  Well. No angina. Rhythm stable. Remains compensated. bp is good. No changes. Reviewed previous records and testing including myoview 8/12 and echo 5/15. Echo shows MR/AI but she says she would never consider surgical intervention. Will continue to follow clinically. Continue medical therapy. Follow up 3 months .

## 2018-04-06 RX ORDER — SIMVASTATIN 20 MG
TABLET ORAL
Qty: 30 TABLET | Refills: 4 | Status: SHIPPED | OUTPATIENT
Start: 2018-04-06 | End: 2018-09-20 | Stop reason: SDUPTHER

## 2018-04-11 PROBLEM — Z00.00 PREVENTATIVE HEALTH CARE: Status: RESOLVED | Noted: 2018-03-06 | Resolved: 2018-04-11

## 2018-05-17 ENCOUNTER — TELEPHONE (OUTPATIENT)
Dept: INTERNAL MEDICINE | Age: 83
End: 2018-05-17

## 2018-05-17 DIAGNOSIS — H91.90 HEARING LOSS, UNSPECIFIED HEARING LOSS TYPE, UNSPECIFIED LATERALITY: Primary | ICD-10-CM

## 2018-05-18 ENCOUNTER — TELEPHONE (OUTPATIENT)
Dept: INTERNAL MEDICINE | Age: 83
End: 2018-05-18

## 2018-05-21 ENCOUNTER — TELEPHONE (OUTPATIENT)
Dept: INTERNAL MEDICINE | Age: 83
End: 2018-05-21

## 2018-05-24 ENCOUNTER — OFFICE VISIT (OUTPATIENT)
Dept: CARDIOLOGY CLINIC | Age: 83
End: 2018-05-24

## 2018-05-24 VITALS
WEIGHT: 103.8 LBS | SYSTOLIC BLOOD PRESSURE: 100 MMHG | DIASTOLIC BLOOD PRESSURE: 30 MMHG | BODY MASS INDEX: 31.68 KG/M2 | HEART RATE: 60 BPM

## 2018-05-24 DIAGNOSIS — I35.1 NONRHEUMATIC AORTIC VALVE INSUFFICIENCY: ICD-10-CM

## 2018-05-24 DIAGNOSIS — I95.2 HYPOTENSION DUE TO DRUGS: ICD-10-CM

## 2018-05-24 DIAGNOSIS — I25.10 CORONARY ARTERY DISEASE INVOLVING NATIVE CORONARY ARTERY OF NATIVE HEART WITHOUT ANGINA PECTORIS: Primary | ICD-10-CM

## 2018-05-24 PROCEDURE — 99213 OFFICE O/P EST LOW 20 MIN: CPT | Performed by: NURSE PRACTITIONER

## 2018-05-24 PROCEDURE — G8417 CALC BMI ABV UP PARAM F/U: HCPCS | Performed by: NURSE PRACTITIONER

## 2018-05-24 PROCEDURE — 1090F PRES/ABSN URINE INCON ASSESS: CPT | Performed by: NURSE PRACTITIONER

## 2018-05-24 PROCEDURE — 1036F TOBACCO NON-USER: CPT | Performed by: NURSE PRACTITIONER

## 2018-05-24 PROCEDURE — 1123F ACP DISCUSS/DSCN MKR DOCD: CPT | Performed by: NURSE PRACTITIONER

## 2018-05-24 PROCEDURE — 4040F PNEUMOC VAC/ADMIN/RCVD: CPT | Performed by: NURSE PRACTITIONER

## 2018-05-24 PROCEDURE — G8598 ASA/ANTIPLAT THER USED: HCPCS | Performed by: NURSE PRACTITIONER

## 2018-05-24 PROCEDURE — G8427 DOCREV CUR MEDS BY ELIG CLIN: HCPCS | Performed by: NURSE PRACTITIONER

## 2018-05-24 RX ORDER — BENAZEPRIL HYDROCHLORIDE 10 MG/1
5 TABLET ORAL DAILY
Qty: 90 TABLET | Refills: 4 | Status: SHIPPED | OUTPATIENT
Start: 2018-05-24 | End: 2019-06-27 | Stop reason: SDUPTHER

## 2018-05-24 ASSESSMENT — ENCOUNTER SYMPTOMS
GASTROINTESTINAL NEGATIVE: 1
RESPIRATORY NEGATIVE: 1

## 2018-05-25 ENCOUNTER — TELEPHONE (OUTPATIENT)
Dept: INTERNAL MEDICINE | Age: 83
End: 2018-05-25

## 2018-05-25 RX ORDER — CIPROFLOXACIN 250 MG/1
250 TABLET, FILM COATED ORAL 2 TIMES DAILY
Qty: 14 TABLET | Refills: 0 | Status: SHIPPED | OUTPATIENT
Start: 2018-05-25 | End: 2018-06-01

## 2018-05-29 ENCOUNTER — TELEPHONE (OUTPATIENT)
Dept: INTERNAL MEDICINE | Age: 83
End: 2018-05-29

## 2018-06-01 ENCOUNTER — OFFICE VISIT (OUTPATIENT)
Dept: INTERNAL MEDICINE | Age: 83
End: 2018-06-01

## 2018-06-01 VITALS
SYSTOLIC BLOOD PRESSURE: 122 MMHG | DIASTOLIC BLOOD PRESSURE: 64 MMHG | BODY MASS INDEX: 31.43 KG/M2 | WEIGHT: 103 LBS | HEART RATE: 68 BPM

## 2018-06-01 DIAGNOSIS — K21.9 GERD WITHOUT ESOPHAGITIS: Primary | ICD-10-CM

## 2018-06-01 PROCEDURE — 4040F PNEUMOC VAC/ADMIN/RCVD: CPT | Performed by: INTERNAL MEDICINE

## 2018-06-01 PROCEDURE — 1036F TOBACCO NON-USER: CPT | Performed by: INTERNAL MEDICINE

## 2018-06-01 PROCEDURE — G8598 ASA/ANTIPLAT THER USED: HCPCS | Performed by: INTERNAL MEDICINE

## 2018-06-01 PROCEDURE — 1090F PRES/ABSN URINE INCON ASSESS: CPT | Performed by: INTERNAL MEDICINE

## 2018-06-01 PROCEDURE — 99213 OFFICE O/P EST LOW 20 MIN: CPT | Performed by: INTERNAL MEDICINE

## 2018-06-01 PROCEDURE — G8417 CALC BMI ABV UP PARAM F/U: HCPCS | Performed by: INTERNAL MEDICINE

## 2018-06-01 PROCEDURE — G8427 DOCREV CUR MEDS BY ELIG CLIN: HCPCS | Performed by: INTERNAL MEDICINE

## 2018-06-01 PROCEDURE — 1123F ACP DISCUSS/DSCN MKR DOCD: CPT | Performed by: INTERNAL MEDICINE

## 2018-06-01 ASSESSMENT — ENCOUNTER SYMPTOMS
CONSTIPATION: 0
SHORTNESS OF BREATH: 0
VOMITING: 0
CHEST TIGHTNESS: 0
ABDOMINAL DISTENTION: 1
BACK PAIN: 0
EYE REDNESS: 0
DIARRHEA: 0
ABDOMINAL PAIN: 0
COUGH: 0
NAUSEA: 0

## 2018-06-22 RX ORDER — DONEPEZIL HYDROCHLORIDE 10 MG/1
10 TABLET, FILM COATED ORAL NIGHTLY
Qty: 30 TABLET | Refills: 5 | Status: SHIPPED | OUTPATIENT
Start: 2018-06-22 | End: 2019-01-25 | Stop reason: SDUPTHER

## 2018-07-02 ENCOUNTER — OFFICE VISIT (OUTPATIENT)
Dept: CARDIOLOGY CLINIC | Age: 83
End: 2018-07-02

## 2018-07-02 VITALS
BODY MASS INDEX: 32.65 KG/M2 | DIASTOLIC BLOOD PRESSURE: 70 MMHG | WEIGHT: 107 LBS | SYSTOLIC BLOOD PRESSURE: 122 MMHG | HEART RATE: 68 BPM

## 2018-07-02 DIAGNOSIS — I34.0 NON-RHEUMATIC MITRAL REGURGITATION: ICD-10-CM

## 2018-07-02 DIAGNOSIS — R00.2 PALPITATIONS: ICD-10-CM

## 2018-07-02 DIAGNOSIS — I35.1 NONRHEUMATIC AORTIC VALVE INSUFFICIENCY: ICD-10-CM

## 2018-07-02 DIAGNOSIS — I25.10 CAD IN NATIVE ARTERY: Primary | ICD-10-CM

## 2018-07-02 DIAGNOSIS — I10 ESSENTIAL HYPERTENSION: ICD-10-CM

## 2018-07-02 DIAGNOSIS — I47.1 SVT (SUPRAVENTRICULAR TACHYCARDIA) (HCC): ICD-10-CM

## 2018-07-02 PROCEDURE — G8598 ASA/ANTIPLAT THER USED: HCPCS | Performed by: INTERNAL MEDICINE

## 2018-07-02 PROCEDURE — 1123F ACP DISCUSS/DSCN MKR DOCD: CPT | Performed by: INTERNAL MEDICINE

## 2018-07-02 PROCEDURE — 99214 OFFICE O/P EST MOD 30 MIN: CPT | Performed by: INTERNAL MEDICINE

## 2018-07-02 PROCEDURE — G8417 CALC BMI ABV UP PARAM F/U: HCPCS | Performed by: INTERNAL MEDICINE

## 2018-07-02 PROCEDURE — 4040F PNEUMOC VAC/ADMIN/RCVD: CPT | Performed by: INTERNAL MEDICINE

## 2018-07-02 PROCEDURE — 1090F PRES/ABSN URINE INCON ASSESS: CPT | Performed by: INTERNAL MEDICINE

## 2018-07-02 PROCEDURE — G8427 DOCREV CUR MEDS BY ELIG CLIN: HCPCS | Performed by: INTERNAL MEDICINE

## 2018-07-02 PROCEDURE — 1036F TOBACCO NON-USER: CPT | Performed by: INTERNAL MEDICINE

## 2018-07-02 NOTE — PROGRESS NOTES
distress. She has no wheezes. She has no rales. Abdominal: Soft. Bowel sounds are normal. No tenderness. Musculoskeletal: Normal range of motion. She exhibits tr edema. Neurological: She is alert and oriented to person, place, and time. Skin: Skin is warm and dry. Psychiatric: She has a normal mood and affect. Her behavior is normal. Thought content normal.       Assessment:       Diagnosis Orders   1. CAD in native artery     2. Essential hypertension     3. SVT (supraventricular tachycardia) (HCC)     4. Palpitations     5. Nonrheumatic aortic valve insufficiency     6. Non-rheumatic mitral regurgitation             Plan:      CV stable. No angina. BP is good. Rhythm stable. Remains compensated. No changes. Reviewed previous records and testing including myoview 8/12 and echo 5/15. Echo shows MR/AI but she says she would never consider surgical intervention. Will continue to follow clinically. Continue medical therapy. Follow up 3 months .

## 2018-07-06 RX ORDER — NITROGLYCERIN 40 MG/1
PATCH TRANSDERMAL
Qty: 30 PATCH | Refills: 0 | Status: ON HOLD | OUTPATIENT
Start: 2018-07-06 | End: 2018-07-18 | Stop reason: HOSPADM

## 2018-07-13 ENCOUNTER — HOSPITAL ENCOUNTER (INPATIENT)
Dept: TELEMETRY | Age: 83
LOS: 5 days | Discharge: HOME HEALTH | DRG: 308 | End: 2018-07-18
Attending: EMERGENCY MEDICINE | Admitting: INTERNAL MEDICINE
Payer: MEDICARE

## 2018-07-13 DIAGNOSIS — I50.9 CONGESTIVE HEART FAILURE, UNSPECIFIED CONGESTIVE HEART FAILURE CHRONICITY, UNSPECIFIED CONGESTIVE HEART FAILURE TYPE: ICD-10-CM

## 2018-07-13 DIAGNOSIS — R07.9 CHEST PAIN, UNSPECIFIED TYPE: ICD-10-CM

## 2018-07-13 DIAGNOSIS — L03.90 CELLULITIS, UNSPECIFIED CELLULITIS SITE: ICD-10-CM

## 2018-07-13 DIAGNOSIS — I48.91 ATRIAL FIBRILLATION, UNSPECIFIED TYPE (HCC): Primary | ICD-10-CM

## 2018-07-13 LAB
ALBUMIN SERPL-MCNC: 3.7 G/DL (ref 3.4–5)
ALP BLD-CCNC: 46 U/L (ref 40–129)
ALT SERPL-CCNC: 14 U/L (ref 10–40)
ANION GAP SERPL CALCULATED.3IONS-SCNC: 13 MMOL/L (ref 3–16)
APTT: 30.1 SEC (ref 26–36)
AST SERPL-CCNC: 21 U/L (ref 15–37)
BASOPHILS ABSOLUTE: 0 K/UL (ref 0–0.2)
BASOPHILS RELATIVE PERCENT: 0.3 %
BILIRUB SERPL-MCNC: <0.2 MG/DL (ref 0–1)
BILIRUBIN DIRECT: <0.2 MG/DL (ref 0–0.3)
BILIRUBIN, INDIRECT: NORMAL MG/DL (ref 0–1)
BUN BLDV-MCNC: 19 MG/DL (ref 7–20)
CALCIUM SERPL-MCNC: 9.1 MG/DL (ref 8.3–10.6)
CHLORIDE BLD-SCNC: 103 MMOL/L (ref 99–110)
CO2: 27 MMOL/L (ref 21–32)
CREAT SERPL-MCNC: 0.9 MG/DL (ref 0.6–1.2)
EOSINOPHILS ABSOLUTE: 0.1 K/UL (ref 0–0.6)
EOSINOPHILS RELATIVE PERCENT: 2.7 %
GFR AFRICAN AMERICAN: >60
GFR NON-AFRICAN AMERICAN: 58
GLUCOSE BLD-MCNC: 86 MG/DL (ref 70–99)
HCT VFR BLD CALC: 32.9 % (ref 36–48)
HCT VFR BLD CALC: 36.7 % (ref 36–48)
HEMOGLOBIN: 11 G/DL (ref 12–16)
HEMOGLOBIN: 12.2 G/DL (ref 12–16)
LIPASE: 30 U/L (ref 13–60)
LYMPHOCYTES ABSOLUTE: 0.3 K/UL (ref 1–5.1)
LYMPHOCYTES RELATIVE PERCENT: 6.1 %
MAGNESIUM: 1.7 MG/DL (ref 1.8–2.4)
MCH RBC QN AUTO: 29.9 PG (ref 26–34)
MCH RBC QN AUTO: 30 PG (ref 26–34)
MCHC RBC AUTO-ENTMCNC: 33.3 G/DL (ref 31–36)
MCHC RBC AUTO-ENTMCNC: 33.3 G/DL (ref 31–36)
MCV RBC AUTO: 89.9 FL (ref 80–100)
MCV RBC AUTO: 90.2 FL (ref 80–100)
MONOCYTES ABSOLUTE: 0.6 K/UL (ref 0–1.3)
MONOCYTES RELATIVE PERCENT: 11.9 %
NEUTROPHILS ABSOLUTE: 4.1 K/UL (ref 1.7–7.7)
NEUTROPHILS RELATIVE PERCENT: 79 %
PDW BLD-RTO: 13.3 % (ref 12.4–15.4)
PDW BLD-RTO: 13.5 % (ref 12.4–15.4)
PLATELET # BLD: 233 K/UL (ref 135–450)
PLATELET # BLD: 251 K/UL (ref 135–450)
PMV BLD AUTO: 7.7 FL (ref 5–10.5)
PMV BLD AUTO: 8 FL (ref 5–10.5)
POTASSIUM SERPL-SCNC: 4.3 MMOL/L (ref 3.5–5.1)
PRO-BNP: 5395 PG/ML (ref 0–449)
RBC # BLD: 3.66 M/UL (ref 4–5.2)
RBC # BLD: 4.07 M/UL (ref 4–5.2)
SODIUM BLD-SCNC: 143 MMOL/L (ref 136–145)
TOTAL PROTEIN: 6.5 G/DL (ref 6.4–8.2)
TROPONIN: <0.01 NG/ML
WBC # BLD: 4.3 K/UL (ref 4–11)
WBC # BLD: 5.2 K/UL (ref 4–11)

## 2018-07-13 PROCEDURE — 84443 ASSAY THYROID STIM HORMONE: CPT

## 2018-07-13 PROCEDURE — 83735 ASSAY OF MAGNESIUM: CPT

## 2018-07-13 PROCEDURE — 96374 THER/PROPH/DIAG INJ IV PUSH: CPT

## 2018-07-13 PROCEDURE — 85730 THROMBOPLASTIN TIME PARTIAL: CPT

## 2018-07-13 PROCEDURE — 83880 ASSAY OF NATRIURETIC PEPTIDE: CPT

## 2018-07-13 PROCEDURE — 85025 COMPLETE CBC W/AUTO DIFF WBC: CPT

## 2018-07-13 PROCEDURE — 80076 HEPATIC FUNCTION PANEL: CPT

## 2018-07-13 PROCEDURE — 83690 ASSAY OF LIPASE: CPT

## 2018-07-13 PROCEDURE — 84484 ASSAY OF TROPONIN QUANT: CPT

## 2018-07-13 PROCEDURE — 71046 X-RAY EXAM CHEST 2 VIEWS: CPT

## 2018-07-13 PROCEDURE — 87040 BLOOD CULTURE FOR BACTERIA: CPT

## 2018-07-13 PROCEDURE — 85027 COMPLETE CBC AUTOMATED: CPT

## 2018-07-13 PROCEDURE — 9990 CHARGE CONVERSION

## 2018-07-13 PROCEDURE — 36415 COLL VENOUS BLD VENIPUNCTURE: CPT

## 2018-07-13 PROCEDURE — 80048 BASIC METABOLIC PNL TOTAL CA: CPT

## 2018-07-13 PROCEDURE — 99285 EMERGENCY DEPT VISIT HI MDM: CPT

## 2018-07-13 PROCEDURE — 93005 ELECTROCARDIOGRAM TRACING: CPT

## 2018-07-13 RX ORDER — HEPARIN SODIUM 5000 [USP'U]/ML
80 INJECTION, SOLUTION INTRAVENOUS; SUBCUTANEOUS PRN
Status: DISCONTINUED | OUTPATIENT
Start: 2018-07-13 | End: 2018-07-16

## 2018-07-13 RX ORDER — ASPIRIN 81 MG/1
81 TABLET, CHEWABLE ORAL ONCE
Status: DISCONTINUED | OUTPATIENT
Start: 2018-07-13 | End: 2018-07-13

## 2018-07-13 RX ORDER — CEPHALEXIN 500 MG/1
500 CAPSULE ORAL ONCE
Status: DISCONTINUED | OUTPATIENT
Start: 2018-07-13 | End: 2018-07-13

## 2018-07-13 RX ORDER — SODIUM CHLORIDE 0.9 % (FLUSH) 0.9 %
10 SYRINGE (ML) INJECTION EVERY 12 HOURS SCHEDULED
Status: DISCONTINUED | OUTPATIENT
Start: 2018-07-13 | End: 2018-07-18 | Stop reason: HOSPADM

## 2018-07-13 RX ORDER — DOXYCYCLINE 100 MG/1
100 CAPSULE ORAL ONCE
Status: COMPLETED | OUTPATIENT
Start: 2018-07-13 | End: 2018-07-13

## 2018-07-13 RX ORDER — CALCIUM CARBONATE 500(1250)
1 TABLET ORAL DAILY
Status: DISCONTINUED | OUTPATIENT
Start: 2018-07-14 | End: 2018-07-18 | Stop reason: HOSPADM

## 2018-07-13 RX ORDER — ONDANSETRON 2 MG/ML
4 INJECTION INTRAMUSCULAR; INTRAVENOUS EVERY 6 HOURS PRN
Status: DISCONTINUED | OUTPATIENT
Start: 2018-07-13 | End: 2018-07-18 | Stop reason: HOSPADM

## 2018-07-13 RX ORDER — PHENOL 1.4 %
1 AEROSOL, SPRAY (ML) MUCOUS MEMBRANE DAILY
COMMUNITY
End: 2019-12-17

## 2018-07-13 RX ORDER — SIMVASTATIN 20 MG
20 TABLET ORAL NIGHTLY
Status: DISCONTINUED | OUTPATIENT
Start: 2018-07-13 | End: 2018-07-18 | Stop reason: HOSPADM

## 2018-07-13 RX ORDER — HEPARIN SODIUM 10000 [USP'U]/100ML
12 INJECTION, SOLUTION INTRAVENOUS CONTINUOUS
Status: DISCONTINUED | OUTPATIENT
Start: 2018-07-13 | End: 2018-07-16 | Stop reason: ALTCHOICE

## 2018-07-13 RX ORDER — ACETAMINOPHEN 325 MG/1
650 TABLET ORAL NIGHTLY
Status: DISCONTINUED | OUTPATIENT
Start: 2018-07-13 | End: 2018-07-18 | Stop reason: HOSPADM

## 2018-07-13 RX ORDER — ASPIRIN 81 MG/1
324 TABLET, CHEWABLE ORAL ONCE
Status: COMPLETED | OUTPATIENT
Start: 2018-07-13 | End: 2018-07-13

## 2018-07-13 RX ORDER — FAMOTIDINE 20 MG/1
20 TABLET, FILM COATED ORAL DAILY
Status: DISCONTINUED | OUTPATIENT
Start: 2018-07-14 | End: 2018-07-18 | Stop reason: HOSPADM

## 2018-07-13 RX ORDER — HEPARIN SODIUM 5000 [USP'U]/ML
40 INJECTION, SOLUTION INTRAVENOUS; SUBCUTANEOUS PRN
Status: DISCONTINUED | OUTPATIENT
Start: 2018-07-13 | End: 2018-07-16

## 2018-07-13 RX ORDER — HEPARIN SODIUM 5000 [USP'U]/ML
80 INJECTION, SOLUTION INTRAVENOUS; SUBCUTANEOUS ONCE
Status: COMPLETED | OUTPATIENT
Start: 2018-07-13 | End: 2018-07-13

## 2018-07-13 RX ORDER — DONEPEZIL HYDROCHLORIDE 10 MG/1
10 TABLET, FILM COATED ORAL NIGHTLY
Status: DISCONTINUED | OUTPATIENT
Start: 2018-07-13 | End: 2018-07-18 | Stop reason: HOSPADM

## 2018-07-13 RX ORDER — ASPIRIN 81 MG/1
81 TABLET ORAL DAILY
Status: DISCONTINUED | OUTPATIENT
Start: 2018-07-14 | End: 2018-07-18 | Stop reason: HOSPADM

## 2018-07-13 RX ORDER — ONDANSETRON 2 MG/ML
4 INJECTION INTRAMUSCULAR; INTRAVENOUS ONCE
Status: DISCONTINUED | OUTPATIENT
Start: 2018-07-13 | End: 2018-07-13 | Stop reason: HOSPADM

## 2018-07-13 RX ORDER — NITROGLYCERIN 40 MG/1
1 PATCH TRANSDERMAL DAILY
Status: DISCONTINUED | OUTPATIENT
Start: 2018-07-14 | End: 2018-07-14

## 2018-07-13 RX ORDER — SODIUM CHLORIDE 0.9 % (FLUSH) 0.9 %
10 SYRINGE (ML) INJECTION PRN
Status: DISCONTINUED | OUTPATIENT
Start: 2018-07-13 | End: 2018-07-18 | Stop reason: HOSPADM

## 2018-07-13 RX ORDER — CLINDAMYCIN PHOSPHATE 600 MG/50ML
600 INJECTION INTRAVENOUS EVERY 8 HOURS
Status: DISCONTINUED | OUTPATIENT
Start: 2018-07-13 | End: 2018-07-18 | Stop reason: HOSPADM

## 2018-07-13 RX ORDER — MAGNESIUM SULFATE IN WATER 40 MG/ML
2 INJECTION, SOLUTION INTRAVENOUS ONCE
Status: COMPLETED | OUTPATIENT
Start: 2018-07-13 | End: 2018-07-13

## 2018-07-13 RX ORDER — METOPROLOL TARTRATE 5 MG/5ML
5 INJECTION INTRAVENOUS ONCE
Status: COMPLETED | OUTPATIENT
Start: 2018-07-13 | End: 2018-07-13

## 2018-07-13 RX ORDER — CLOPIDOGREL BISULFATE 75 MG/1
75 TABLET ORAL DAILY
Status: DISCONTINUED | OUTPATIENT
Start: 2018-07-14 | End: 2018-07-13

## 2018-07-13 RX ADMIN — SIMVASTATIN 20 MG: 20 TABLET, FILM COATED ORAL at 22:58

## 2018-07-13 RX ADMIN — ASPIRIN 324 MG: 81 TABLET, CHEWABLE ORAL at 15:18

## 2018-07-13 RX ADMIN — CLINDAMYCIN PHOSPHATE 600 MG: 12 INJECTION, SOLUTION INTRAMUSCULAR; INTRAVENOUS at 22:58

## 2018-07-13 RX ADMIN — DOXYCYCLINE 100 MG: 100 CAPSULE ORAL at 16:14

## 2018-07-13 RX ADMIN — ACETAMINOPHEN 650 MG: 325 TABLET, FILM COATED ORAL at 22:58

## 2018-07-13 RX ADMIN — MAGNESIUM SULFATE IN WATER 2 G: 40 INJECTION, SOLUTION INTRAVENOUS at 16:14

## 2018-07-13 RX ADMIN — HEPARIN SODIUM 3900 UNITS: 5000 INJECTION, SOLUTION INTRAVENOUS; SUBCUTANEOUS at 21:02

## 2018-07-13 RX ADMIN — Medication 12.5 MG: at 22:58

## 2018-07-13 RX ADMIN — DONEPEZIL HYDROCHLORIDE 10 MG: 10 TABLET, FILM COATED ORAL at 22:58

## 2018-07-13 RX ADMIN — METOPROLOL TARTRATE 5 MG: 5 INJECTION INTRAVENOUS at 14:50

## 2018-07-13 RX ADMIN — Medication 10 ML: at 22:58

## 2018-07-13 RX ADMIN — HEPARIN SODIUM AND DEXTROSE 18 UNITS/KG/HR: 10000; 5 INJECTION INTRAVENOUS at 21:02

## 2018-07-13 ASSESSMENT — PAIN SCALES - GENERAL
PAINLEVEL_OUTOF10: 0
PAINLEVEL_OUTOF10: 0

## 2018-07-13 NOTE — PROGRESS NOTES
List of Home Medications the patient is currently taking is complete. Source of medications in list is pt's list that was brought to the hospital. Pt states she did not take any medications today. She did state she has been taking an antibiotic for a UTI and started this last Sunday.   Srinivas Crews RN

## 2018-07-13 NOTE — H&P
Internal Medicine PGY-1 Resident History & Physical      PCP: Rafael Kohler MD    Date of Admission: 7/13/2018    Date of Service: Pt seen/examined on 7/13/18 and Admitted to Inpatient with expected LOS greater than two midnights due to medical therapy. Placed in Observation. Chief Complaint:  Schuyler Solders, feeling gassy, not right\" found to be in new onset A-fib ED      History Of Present Illness:     80 y.o. female [facility] with one day history of feeling  \"nauseaous, gassy, and not right\". She experienced this mid-day after doing some house work, and it continued to gradually worsen despite rest. She instructed her home medical assistant to call 911. In the ED, she found to have A-fib, with no previous history of arrhythmia. She was placed on Keflex 500 mg BID on 7/8 for UTI on an outpatient visit. She has noticed new onset urinary incontience that she describes as, \"constant dribbling\" for the last three weeks, which has mildly improved with Keflex, with no dysuria, hematuria. In the last day, there has been a red rash that has developed on her right inner calf (starting in the morning), and another identical rash on the inner calf of her left leg as of this evening. She has had the rash once before, but cannot remember when or why. She denies shortness of breath, chest pain, leg swelling, headache, vision changes, diarrhea, fevers, chills, or any sick contracts. In the ED, she was started on heparin, received  mg x 1, and metoprolol 5 mg IV x 1. She also received magnesium sulfate 2 grams x 1 (for low mg), and doxycycline 100 mg x 1.      Past Medical History:          Diagnosis Date    CAD (coronary artery disease)     Hiatal hernia     Hyperlipidemia     Hypertension     Macular degeneration of both eyes     wet - Dr. Brady Metcalf that she does not have any past medical history accept, \"20% of her lung function gone\"    Past Surgical History:          Procedure positives as noted in the HPI. All other systems reviewed and negative. ROS: ROS    PHYSICAL EXAM PERFORMED:    /72   Pulse 110   Temp 98.7 °F (37.1 °C) (Axillary)   Resp 18   Ht 4' (1.219 m)   Wt 107 lb (48.5 kg)   SpO2 99%   BMI 32.65 kg/m²     General appearance:  No apparent distress, appears stated age and cooperative. HEENT:  Normal cephalic, atraumatic without obvious deformity. Extra ocular muscles intact. Conjunctivae/corneas clear. Neck: Supple, with full range of motion. JVD + (2 cm above sternal notch approximately). Trachea midline. Respiratory:  Normal respiratory effort. Clear to auscultation, bilaterally without Rales/Wheezes/Rhonchi. Cardiovascular:  Regular rate but irregular rhythm with normal S1/S2 without murmurs, rubs or gallops. Abdomen: Soft, non-tender, non-distended with normal bowel sounds. Musculoskeletal:  No clubbing, cyanosis or edema bilaterally. Full range of motion without deformity. Skin:    Erythematous, mildly painful rash of the lower extremities worse on right > left. Areas of redness do not cisco with pressure, not-raised or edematous. Neurologic:  Neurovascularly intact without any focal sensory/motor deficits. Psychiatric:  Alert and oriented, thought content appropriate, normal insight  Capillary Refill: Brisk,< 3 seconds   Peripheral Pulses: +2 palpable, equal bilaterally       Labs:     Recent Labs      07/13/18   1448   WBC  5.2   HGB  12.2   HCT  36.7   PLT  251     Recent Labs      07/13/18   1448   NA  143   K  4.3   CL  103   CO2  27   BUN  19   CREATININE  0.9   CALCIUM  9.1     Recent Labs      07/13/18   1448   AST  21   ALT  14   BILIDIR  <0.2   BILITOT  <0.2   ALKPHOS  46     No results for input(s): INR in the last 72 hours.   Recent Labs      07/13/18   1448   TROPONINI  <0.01   Pro-BNP= 5,395   Lipase= 30     Urinalysis:      Lab Results   Component Value Date    NITRU POSITIVE 02/07/2018    WBCUA  02/07/2018    BACTERIA 4+ (hold until 7/14)  -Echocardiogram  -Telemetry   -Cardiology consult     Rash  1 day presentation of bilateral lower extremity rash. Erythematous, non-raised, non-targetoid, non-blanching. Painful only with firm touch, no edema, urticaria. DDX includes cellulitis, Keflex-related rash, DVT's.   -Blood cultures x 2  -Clindamycin 600 mg Q 8 H (avoiding penicillins due to unknown allergy, cephalosporins due to recent Keflex use)   -Elevate extremities   -No SCD's at this time     UTI  Recent UTI, placed on Keflex 500 mg BID on 7/8 for UTI on an outpatient visit. Has finished 5 days of antibiotics New onset \"constant dribbling\" for the last three weeks. History of bladder \"suspension\" in 2008 and \"tuck\". UA in ED + nitrite, bacteria 4+, WBC's . Urine culture from [facility] 7/9/18 grew E.  Coli >100,000 CFU, pan-sensitive.   -Stop Keflex for now (due to ddx under rash)    -May restart for the 2 remaining days pending rash progression  -Will obtain repeat culture     7/9/18 [address] [place] [address] Avenue:   ROUTINE URINE CULTURE  ROUTINE URINE CULTURE   Component Value Ref Range Performed At   Culture Result: >100,000 cfu/mL (H)   [facility]   ampicillin <=8 (S)   [facility]   Ampicillin/Sulbactam <=8/4 (S)   [facility]   Piperacillin/Tazobactam <=16 (S)   [facility]   Cefazolin <=8 (S)   [facility]   Cefuroxime 8 (S)   [facility]   Ceftriaxone <=8 (S)   [facility]   Cefepime <=4 (S)   [facility]   Meropenem <=1 (S)   [facility]   Levofloxacin <=2 (S)   [facility]   Gentamicin <=4 (S)   [facility]   Tobramicin <=4 (S)   [facility]   Nitrofurantoin <=32 (S)   [facility]   Sulfa/Trimethoprim <=2/38 (S)   [facility]   Culture Result: Escherichia coli (H)   [facility]           Chronic:  HTN  -Continue home metoprolol tartrate 12.5 mg BID   -May need to increase to 25 mg BID given A-fib  -Continue home benazepril 10 mg

## 2018-07-13 NOTE — ED PROVIDER NOTES
ED Attending Attestation Note     Date of evaluation: 7/13/2018    This patient was seen by the resident. I have seen and examined the patient, agree with the workup, evaluation, management and diagnosis. The care plan has been discussed. I have reviewed the ECG and concur with the resident's interpretation. My assessment reveals An elderly patients, but very briskly alert and appropriate interactive, pleasant and conversational, in no acute distress. She presents today with somewhat vague symptoms of fatigue, nausea, and shortness of breath, and is found to be in atrial fibrillation with a rapid ventricular response. This is a new diagnosis for the patient. This may be incited by a recent diagnosis of UTI, or by what appears to be a new cellulitis on the anterior aspect of the right shin greater than the left shin. She certainly appears to be symptomatic with her atrial fibrillation, but is hemodynamically stable.          Zuly Child MD  07/13/18 0909
magnesium. For the patient's new onset atrial fibrillation she was given 10 of IV metoprolol with improvement in her rate to the 80s. She was never unstable. The cause of her A. fib may be her low magnesium versus heart failure. My suspicion for acute PE. Given all of the above the patient warranted admission to the hospital.  A consult her primary care physician as well as the hospitalist and the patient was admitted to floor level of care. At this time it was determined this patient warranted admission to the hospital. The admitting team was contacted, and they informed us to this patient's stay here in the emergency department, and his workup here. The patient was informed as to our recommendations for admission, and agrees to them. Please see the admitting team's dictations and notes for further treatment and course for this patient's hospital stay. Clinical Impression     1. Atrial fibrillation, unspecified type ([address]    2. Chest pain, unspecified type    3. Congestive heart failure, unspecified congestive heart failure chronicity, unspecified congestive heart failure type (HCC)    4. Cellulitis, unspecified cellulitis site        Disposition/Plan     PATIENT REFERRED TO:  No follow-up provider specified.     DISCHARGE MEDICATIONS:  New Prescriptions    No medications on file       DISPOSITION Decision To Admit 07/13/2018 04:00:05 PM         Marcie Marie MD  Resident  07/13/18 1762

## 2018-07-13 NOTE — ED TRIAGE NOTES
Unable to review Home Medication List with the patient Medications list will need to be re-assessed.  ekg at bedside

## 2018-07-14 LAB
ANION GAP SERPL CALCULATED.3IONS-SCNC: 11 MMOL/L (ref 3–16)
ANTI-XA UNFRAC HEPARIN: 0.21 IU/ML (ref 0.3–0.7)
ANTI-XA UNFRAC HEPARIN: 0.5 IU/ML (ref 0.3–0.7)
ANTI-XA UNFRAC HEPARIN: 1.03 IU/ML (ref 0.3–0.7)
BASOPHILS ABSOLUTE: 0 K/UL (ref 0–0.2)
BASOPHILS RELATIVE PERCENT: 0.4 %
BUN BLDV-MCNC: 22 MG/DL (ref 7–20)
CALCIUM SERPL-MCNC: 8.9 MG/DL (ref 8.3–10.6)
CHLORIDE BLD-SCNC: 106 MMOL/L (ref 99–110)
CO2: 26 MMOL/L (ref 21–32)
CREAT SERPL-MCNC: 1 MG/DL (ref 0.6–1.2)
EOSINOPHILS ABSOLUTE: 0.2 K/UL (ref 0–0.6)
EOSINOPHILS RELATIVE PERCENT: 4.6 %
GFR AFRICAN AMERICAN: >60
GFR NON-AFRICAN AMERICAN: 52
GLUCOSE BLD-MCNC: 107 MG/DL (ref 70–99)
HCT VFR BLD CALC: 30.8 % (ref 36–48)
HEMOGLOBIN: 10.4 G/DL (ref 12–16)
LYMPHOCYTES ABSOLUTE: 0.4 K/UL (ref 1–5.1)
LYMPHOCYTES RELATIVE PERCENT: 11.7 %
MCH RBC QN AUTO: 30.1 PG (ref 26–34)
MCHC RBC AUTO-ENTMCNC: 33.9 G/DL (ref 31–36)
MCV RBC AUTO: 88.6 FL (ref 80–100)
MONOCYTES ABSOLUTE: 0.5 K/UL (ref 0–1.3)
MONOCYTES RELATIVE PERCENT: 12.2 %
NEUTROPHILS ABSOLUTE: 2.7 K/UL (ref 1.7–7.7)
NEUTROPHILS RELATIVE PERCENT: 71.1 %
PDW BLD-RTO: 13.2 % (ref 12.4–15.4)
PLATELET # BLD: 233 K/UL (ref 135–450)
PMV BLD AUTO: 7.8 FL (ref 5–10.5)
POTASSIUM REFLEX MAGNESIUM: 4.4 MMOL/L (ref 3.5–5.1)
RBC # BLD: 3.47 M/UL (ref 4–5.2)
SODIUM BLD-SCNC: 143 MMOL/L (ref 136–145)
TROPONIN: 0.01 NG/ML
TROPONIN: 0.02 NG/ML
TSH REFLEX: 2.93 UIU/ML (ref 0.27–4.2)
WBC # BLD: 3.9 K/UL (ref 4–11)

## 2018-07-14 PROCEDURE — 1200000000 HC SEMI PRIVATE

## 2018-07-14 PROCEDURE — 85520 HEPARIN ASSAY: CPT

## 2018-07-14 PROCEDURE — 97116 GAIT TRAINING THERAPY: CPT

## 2018-07-14 PROCEDURE — 2500000003 HC RX 250 WO HCPCS: Performed by: STUDENT IN AN ORGANIZED HEALTH CARE EDUCATION/TRAINING PROGRAM

## 2018-07-14 PROCEDURE — 97530 THERAPEUTIC ACTIVITIES: CPT

## 2018-07-14 PROCEDURE — 9990 CHARGE CONVERSION

## 2018-07-14 PROCEDURE — 80048 BASIC METABOLIC PNL TOTAL CA: CPT

## 2018-07-14 PROCEDURE — 2580000003 HC RX 258: Performed by: STUDENT IN AN ORGANIZED HEALTH CARE EDUCATION/TRAINING PROGRAM

## 2018-07-14 PROCEDURE — 97110 THERAPEUTIC EXERCISES: CPT

## 2018-07-14 PROCEDURE — 6370000000 HC RX 637 (ALT 250 FOR IP): Performed by: STUDENT IN AN ORGANIZED HEALTH CARE EDUCATION/TRAINING PROGRAM

## 2018-07-14 PROCEDURE — 6360000002 HC RX W HCPCS: Performed by: ANESTHESIOLOGY

## 2018-07-14 PROCEDURE — 97161 PT EVAL LOW COMPLEX 20 MIN: CPT

## 2018-07-14 PROCEDURE — 99223 1ST HOSP IP/OBS HIGH 75: CPT | Performed by: INTERNAL MEDICINE

## 2018-07-14 PROCEDURE — 85025 COMPLETE CBC W/AUTO DIFF WBC: CPT

## 2018-07-14 PROCEDURE — 36415 COLL VENOUS BLD VENIPUNCTURE: CPT

## 2018-07-14 PROCEDURE — 84484 ASSAY OF TROPONIN QUANT: CPT

## 2018-07-14 RX ADMIN — METOPROLOL TARTRATE 25 MG: 25 TABLET, FILM COATED ORAL at 19:54

## 2018-07-14 RX ADMIN — CLINDAMYCIN PHOSPHATE 600 MG: 12 INJECTION, SOLUTION INTRAMUSCULAR; INTRAVENOUS at 15:48

## 2018-07-14 RX ADMIN — ACETAMINOPHEN 650 MG: 325 TABLET, FILM COATED ORAL at 19:54

## 2018-07-14 RX ADMIN — Medication 10 ML: at 19:55

## 2018-07-14 RX ADMIN — HEPARIN SODIUM 1950 UNITS: 5000 INJECTION INTRAVENOUS; SUBCUTANEOUS at 23:24

## 2018-07-14 RX ADMIN — FAMOTIDINE 20 MG: 20 TABLET ORAL at 12:17

## 2018-07-14 RX ADMIN — CLINDAMYCIN PHOSPHATE 600 MG: 12 INJECTION, SOLUTION INTRAMUSCULAR; INTRAVENOUS at 07:29

## 2018-07-14 RX ADMIN — HEPARIN SODIUM AND DEXTROSE 12 UNITS/KG/HR: 10000; 5 INJECTION INTRAVENOUS at 13:36

## 2018-07-14 RX ADMIN — SIMVASTATIN 20 MG: 20 TABLET, FILM COATED ORAL at 19:54

## 2018-07-14 RX ADMIN — Medication 12.5 MG: at 09:55

## 2018-07-14 RX ADMIN — CLINDAMYCIN PHOSPHATE 600 MG: 12 INJECTION, SOLUTION INTRAMUSCULAR; INTRAVENOUS at 22:34

## 2018-07-14 RX ADMIN — ASPIRIN 81 MG: 81 TABLET, COATED ORAL at 09:55

## 2018-07-14 RX ADMIN — DONEPEZIL HYDROCHLORIDE 10 MG: 10 TABLET, FILM COATED ORAL at 19:54

## 2018-07-14 RX ADMIN — CALCIUM 500 MG: 500 TABLET ORAL at 09:55

## 2018-07-14 ASSESSMENT — PAIN SCALES - GENERAL
PAINLEVEL_OUTOF10: 0

## 2018-07-14 NOTE — PROGRESS NOTES
Famotidine 20 mg PO BID ordered for Ms. Mccarty. This medication is renally eliminated. Will change frequency to once daily per renal dose adjustment policy. Est CrCl = 29 ml/min    56in  48.4 kg  SCr = 0.9    Pharmacy will continue to monitor renal function and adjust dose as necessary. Please call with any questions. Thanks! Dori Sheldon, Pharm. D.  7/13/2018 10:41 PM

## 2018-07-14 NOTE — DISCHARGE SUMMARY
use of Keflex for UTI. Likely cellulitis given clinical exam. Megative DVT's, blood cultures x 2 negative, and keflex discontinued. Treatment included Clindamycin 600 mg Q 8 H (avoiding penicillins due to unknown allergy, cephalosporins due to recent Keflex use) with significant improvement in erythema and pain before discharge. Extremities were elevated during stay. She was given clindamycin 300 mg to take PO TID for 1 day at discharge for a total of 7 days of therapy, along with lactobacillus probiotics for GI regulation.      UTI  Recent UTI, placed on Keflex 500 mg BID on 7/8 for UTI on an outpatient visit. Has finished 5 days of antibiotics, and reported new onset \"constant dribbling\" for the last three weeks, but no dysuria, urgency, frequency, hematuria, flank pain. History of bladder \"suspension\" in 2008 and \"tuck\". UA in ED + nitrite, bacteria 4+, WBC's . Urine culture from [facility] 7/9/18 grew E. Coli >100,000 CFU, pan-sensitive. Repeat UA before discharge showed no nitrites, leukocyte esterase or bacteria. Keflex was discontinued at admission.      Chronic:     HLD   Chronic hyperlipidemia was treated with home statin. Disposition:  Home with Home Health Care    Physical Exam Performed:     /77   Pulse 103   Temp 97.4 °F (36.3 °C) (Oral)   Resp 18   Ht 4' 8\" (1.422 m)   Wt 106 lb 11.2 oz (48.4 kg)   SpO2 99%   BMI 23.92 kg/m²     General appearance:  No apparent distress, appears stated age and cooperative. HEENT:  Normal cephalic, atraumatic without obvious deformity. Extra ocular muscles intact. Conjunctivae/corneas clear. Neck: Supple, with full range of motion. JVD+. Trachea midline. Respiratory:  Normal respiratory effort. Faint crackles on lower lung bases; otherwise clear to auscultation, bilaterally without Rales/Wheezes/Rhonchi. Cardiovascular:  Regular rate and rhythm with normal P4/F1, mild systolic murmur, no rubs or gallops.   Abdomen: Soft, non-tender, Please take oral antibiotic for one day following discharge. Please start proton pump inhibitor on discharge with anti-coagulation. Code Status:  Prior Limited    Activity: activity as tolerated    Diet: regular diet      Discharge Medications:     Current Discharge Medication List           Details   calcium carbonate 600 MG TABS tablet Take 1 tablet by mouth daily      nitroGLYCERIN (NITRODUR) 0.2 MG/HR APPLY TO SKIN ONCE DAILY  Qty: 30 patch, Refills: 0      donepezil (ARICEPT) 10 MG tablet TAKE 1 TABLET BY MOUTH NIGHTLY  Qty: 30 tablet, Refills: 5      benazepril (LOTENSIN) 10 MG tablet Take 0.5 tablets by mouth daily  Qty: 90 tablet, Refills: 4      simvastatin (ZOCOR) 20 MG tablet 1 TABLET BY MOUTH AT BEDTIME * NO GRAPEFRUIT *  Qty: 30 tablet, Refills: 4      metoprolol tartrate (LOPRESSOR) 25 MG tablet Take 0.5 tablets by mouth 2 times daily Hold for HR <55  Qty: 60 tablet, Refills: 3    Associated Diagnoses: NSTEMI (non-ST elevated myocardial infarction) ([facility])      clopidogrel (PLAVIX) 75 MG tablet Take 1 tablet by mouth daily  Qty: 30 tablet, Refills: 3      acetaminophen (TYLENOL) 325 MG tablet Take 650 mg by mouth nightly      aspirin 81 MG EC tablet Take 81 mg by mouth daily. Time Spent on discharge is more than 30 minutes in the examination, evaluation, counseling and review of medications and discharge plan. Signed:    Niyah Devi MD   7/13/2018      Thank you Bruce Vieyra MD for the opportunity to be involved in this patient's care. If you have any questions or concerns please feel free to contact me at [phone]. Patient seen and examined, plan of care discussed with residents. Agree with their assessment and plan with following addendum:  Briefly, patient was admitted with new onset a fib. Seen by cardiology. Converted to sinus. Started on eliquis reduced dose for anticoagulation. Iron studies were pending at the time of discharge.  Time spent on discharge more than 30 minutes.        Victory January

## 2018-07-14 NOTE — PROGRESS NOTES
Internal Medicine Progress Note    Admit Date: 7/13/2018    Diet: DIET GENERAL; Interval history:   Patient seen and examined at bedside. Patient is feeling well. Denies chest pain, palpitations, SOB, lightheadedness, syncope, LE edema. Medications:     Scheduled Meds:   metoprolol tartrate  25 mg Oral BID    simvastatin  20 mg Oral Nightly    calcium elemental  1 tablet Oral Daily    donepezil  10 mg Oral Nightly    aspirin  81 mg Oral Daily    acetaminophen  650 mg Oral Nightly    sodium chloride flush  10 mL Intravenous 2 times per day    famotidine  20 mg Oral Daily    clindamycin (CLEOCIN) IV  600 mg Intravenous Q8H     Continuous Infusions:   heparin (porcine) 18 Units/kg/hr (07/13/18 2102)     PRN Meds:heparin (porcine), heparin (porcine), sodium chloride flush, magnesium hydroxide, ondansetron    Objective:   Vitals:   T-max:  Patient Vitals for the past 8 hrs:   BP Temp Temp src Pulse Resp SpO2   07/14/18 1215 116/73 98 °F (36.7 °C) Oral 96 16 97 %   07/14/18 0830 (!) 103/59 97.9 °F (36.6 °C) Oral 96 18 97 %   07/14/18 0445 107/62 - - 105 18 96 %       Intake/Output Summary (Last 24 hours) at 07/14/18 1228  Last data filed at 07/14/18 0830   Gross per 24 hour   Intake              410 ml   Output                0 ml   Net              410 ml       Physical Exam   Constitutional: She is oriented to person, place, and time. No distress. HENT:   Head: Normocephalic and atraumatic. Eyes: EOM are normal. Pupils are equal, round, and reactive to light. Neck: Normal range of motion. Neck supple. No JVD present. Cardiovascular: Intact distal pulses. An irregularly irregular rhythm present. Pulmonary/Chest: Effort normal and breath sounds normal. No respiratory distress. She has no wheezes. She has no rales. She exhibits no tenderness. Abdominal: Soft. Bowel sounds are normal. She exhibits no distension. There is no tenderness.

## 2018-07-14 NOTE — PLAN OF CARE
Problem: Musculor/Skeletal Functional Status  Intervention: PT Evaluation/treatment  Increase safety and independence with functional mobility.

## 2018-07-14 NOTE — CONSULTS
Patient Active Problem List    Diagnosis Date Noted    Hypotension due to drugs 05/24/2018     Priority: High    Nonrheumatic aortic valve insufficiency 03/06/2018    NSTEMI (non-ST elevated myocardial infarction) ([facility] [address] 02/08/2018    Osteoporosis 08/14/2013    Scoliosis 08/14/2013    Memory difficulties 08/14/2013    Depressive disorder 02/08/2013    Macular degeneration of both eyes     CAD (coronary artery disease)     Hyperlipidemia     Hiatal hernia     Essential hypertension 08/24/2011      There are no active hospital problems to display for this patient. - new onset Atrial fibrillation, duration unknown   Agree with heparin as [id number] score is 6   Will need NOAC before discharge   DANA and cardioversion on Monday vs 4 weeks of anticoagulation and cardioversion as outpatient   Will see if HR is still high by tomorrow and will change dose of beta-blocker        - HTN   BP is well controlled. Continue current meds. - HLD   On zocor    - LE rash/cellulitis   On antibiotics          Thank you for allowing me to participate in the care of Lizzie Mccarty     NOTE: This report was transcribed using voice recognition software. Every effort was made to ensure accuracy, however, inadvertent computerized transcription errors may be present.      Marielle Zaragoza MD,   [address]   Office: [phone]

## 2018-07-14 NOTE — PROGRESS NOTES
(progressing to CGA as pt fatigued)  Quality of Gait: pt usually uses 4WW or RW usually so had difficulty managing SW, slow blanka, short step length, narrow [place], flexed posture. Overall steady   Distance: 25'  Stairs/Curb  Stairs?: No     Balance  Posture: Good  Sitting - Static: Good  Sitting - Dynamic: Good  Standing - Static: Good (with SW)  Standing - Dynamic: Fair (with SBA/CGA and SW)  Exercises  Comments: AP x10 BLE independently, Seated marches x10 BLE independently, LAQ x10 BLE independently      PT evaluation and treatment initiated. Treatment included gait and transfer training as well as patient education. Assessment   Body structures, Functions, Activity limitations: Decreased functional mobility ; Decreased ROM; Decreased strength;Decreased endurance  Assessment: Pt is slightly below her reported baseline and will require further skilled PT after d/c to return to [facility]. Pt struggled with lifting/placing SW as she normally uses 4WW. Overall her gait is steady, but she fatigued with short amb requiring CGA for safety. Pt exhibiting good safety awareness verbalizing wanting to take every precaution not to fall. Pt plans to return to [facility] with A prn at d/c. Will continue to follow. Treatment Diagnosis: decreased functional mobility, decreased endurance  Prognosis: Good  Decision Making: Low Complexity  Patient Education: role of PT, discharge planning, use of call light, SW management; pt demonstrating understanding  Barriers to Learning: none  REQUIRES PT FOLLOW UP: Yes  Activity Tolerance  Activity Tolerance: Patient Tolerated treatment well;Patient limited by fatigue;Patient limited by endurance  Activity Tolerance: pt denying fatigue throughout session, but legs visibly becoming shaky towards end of amb. RN reporting slightly elevated HR with amb.           Plan   Plan  Times per week: 2-5  Times per day: Daily  Current Treatment Recommendations: Strengthening, Balance Training, ROM, Functional Mobility Training, Transfer Training, Gait Training, Endurance Training, Safety Education & Training, Home Exercise Program  Safety Devices  Type of devices: Call light within reach, Chair alarm in place, Nurse notified, Left in chair    G-Code     OutComes Score    AM-PAC Score             Goals  Short term goals  Time Frame for Short term goals: d/c  Short term goal 1: pt will be able to perform bed mobility with mod I  Short term goal 2: pt will be able to perform transfers with mod I and RW  Short term goal 3: pt will be able to amb 48' with RW and supervision   Patient Goals   Patient goals : return to [facility] to spend time with her friends there       Therapy Time   Individual Concurrent Group Co-treatment   Time In 0943         Time Out 1036         Minutes 53           Timed Code Treatment Minutes: 38      Total Treatment Minutes:  53    If the patient is discharged before the next treatment session, this note will serve as the discharge summary.      Curt Koo, PT, DPT [id number]

## 2018-07-15 LAB
ANION GAP SERPL CALCULATED.3IONS-SCNC: 10 MMOL/L (ref 3–16)
ANTI-XA UNFRAC HEPARIN: 0.42 IU/ML (ref 0.3–0.7)
ANTI-XA UNFRAC HEPARIN: 0.51 IU/ML (ref 0.3–0.7)
ANTI-XA UNFRAC HEPARIN: 0.75 IU/ML (ref 0.3–0.7)
BASOPHILS ABSOLUTE: 0 K/UL (ref 0–0.2)
BASOPHILS RELATIVE PERCENT: 0.8 %
BUN BLDV-MCNC: 22 MG/DL (ref 7–20)
CALCIUM SERPL-MCNC: 9.3 MG/DL (ref 8.3–10.6)
CHLORIDE BLD-SCNC: 103 MMOL/L (ref 99–110)
CO2: 29 MMOL/L (ref 21–32)
CREAT SERPL-MCNC: 1 MG/DL (ref 0.6–1.2)
EOSINOPHILS ABSOLUTE: 0.2 K/UL (ref 0–0.6)
EOSINOPHILS RELATIVE PERCENT: 4.3 %
GFR AFRICAN AMERICAN: >60
GFR NON-AFRICAN AMERICAN: 52
GLUCOSE BLD-MCNC: 100 MG/DL (ref 70–99)
HCT VFR BLD CALC: 32.2 % (ref 36–48)
HEMOGLOBIN: 11 G/DL (ref 12–16)
LYMPHOCYTES ABSOLUTE: 0.6 K/UL (ref 1–5.1)
LYMPHOCYTES RELATIVE PERCENT: 17.7 %
MCH RBC QN AUTO: 30.2 PG (ref 26–34)
MCHC RBC AUTO-ENTMCNC: 34.1 G/DL (ref 31–36)
MCV RBC AUTO: 88.5 FL (ref 80–100)
MONOCYTES ABSOLUTE: 0.4 K/UL (ref 0–1.3)
MONOCYTES RELATIVE PERCENT: 10.4 %
NEUTROPHILS ABSOLUTE: 2.4 K/UL (ref 1.7–7.7)
NEUTROPHILS RELATIVE PERCENT: 66.8 %
PDW BLD-RTO: 13.6 % (ref 12.4–15.4)
PLATELET # BLD: 244 K/UL (ref 135–450)
PMV BLD AUTO: 7.5 FL (ref 5–10.5)
POTASSIUM REFLEX MAGNESIUM: 4.5 MMOL/L (ref 3.5–5.1)
RBC # BLD: 3.64 M/UL (ref 4–5.2)
SODIUM BLD-SCNC: 142 MMOL/L (ref 136–145)
WBC # BLD: 3.6 K/UL (ref 4–11)

## 2018-07-15 PROCEDURE — 6370000000 HC RX 637 (ALT 250 FOR IP): Performed by: INTERNAL MEDICINE

## 2018-07-15 PROCEDURE — 80048 BASIC METABOLIC PNL TOTAL CA: CPT

## 2018-07-15 PROCEDURE — 6370000000 HC RX 637 (ALT 250 FOR IP): Performed by: STUDENT IN AN ORGANIZED HEALTH CARE EDUCATION/TRAINING PROGRAM

## 2018-07-15 PROCEDURE — 36415 COLL VENOUS BLD VENIPUNCTURE: CPT

## 2018-07-15 PROCEDURE — 2580000003 HC RX 258: Performed by: STUDENT IN AN ORGANIZED HEALTH CARE EDUCATION/TRAINING PROGRAM

## 2018-07-15 PROCEDURE — 2500000003 HC RX 250 WO HCPCS: Performed by: STUDENT IN AN ORGANIZED HEALTH CARE EDUCATION/TRAINING PROGRAM

## 2018-07-15 PROCEDURE — 85025 COMPLETE CBC W/AUTO DIFF WBC: CPT

## 2018-07-15 PROCEDURE — 1200000000 HC SEMI PRIVATE

## 2018-07-15 PROCEDURE — 99233 SBSQ HOSP IP/OBS HIGH 50: CPT | Performed by: INTERNAL MEDICINE

## 2018-07-15 PROCEDURE — 85520 HEPARIN ASSAY: CPT

## 2018-07-15 PROCEDURE — 6360000002 HC RX W HCPCS: Performed by: INTERNAL MEDICINE

## 2018-07-15 RX ORDER — 0.9 % SODIUM CHLORIDE 0.9 %
250 INTRAVENOUS SOLUTION INTRAVENOUS ONCE
Status: COMPLETED | OUTPATIENT
Start: 2018-07-16 | End: 2018-07-16

## 2018-07-15 RX ORDER — BENAZEPRIL HYDROCHLORIDE 10 MG/1
5 TABLET ORAL DAILY
Status: DISCONTINUED | OUTPATIENT
Start: 2018-07-15 | End: 2018-07-15 | Stop reason: CLARIF

## 2018-07-15 RX ORDER — 0.9 % SODIUM CHLORIDE 0.9 %
250 INTRAVENOUS SOLUTION INTRAVENOUS ONCE
Status: COMPLETED | OUTPATIENT
Start: 2018-07-15 | End: 2018-07-15

## 2018-07-15 RX ORDER — LISINOPRIL 5 MG/1
5 TABLET ORAL DAILY
Status: DISCONTINUED | OUTPATIENT
Start: 2018-07-15 | End: 2018-07-15

## 2018-07-15 RX ADMIN — Medication 37.5 MG: at 09:20

## 2018-07-15 RX ADMIN — Medication 37.5 MG: at 20:06

## 2018-07-15 RX ADMIN — ACETAMINOPHEN 650 MG: 325 TABLET, FILM COATED ORAL at 20:07

## 2018-07-15 RX ADMIN — LISINOPRIL 5 MG: 5 TABLET ORAL at 09:20

## 2018-07-15 RX ADMIN — DONEPEZIL HYDROCHLORIDE 10 MG: 10 TABLET, FILM COATED ORAL at 20:07

## 2018-07-15 RX ADMIN — HEPARIN SODIUM AND DEXTROSE 14 UNITS/KG/HR: 10000; 5 INJECTION INTRAVENOUS at 05:49

## 2018-07-15 RX ADMIN — CLINDAMYCIN PHOSPHATE 600 MG: 12 INJECTION, SOLUTION INTRAMUSCULAR; INTRAVENOUS at 23:06

## 2018-07-15 RX ADMIN — SODIUM CHLORIDE 250 ML: 9 INJECTION, SOLUTION INTRAVENOUS at 11:43

## 2018-07-15 RX ADMIN — SIMVASTATIN 20 MG: 20 TABLET, FILM COATED ORAL at 20:07

## 2018-07-15 RX ADMIN — CLINDAMYCIN PHOSPHATE 600 MG: 12 INJECTION, SOLUTION INTRAMUSCULAR; INTRAVENOUS at 05:52

## 2018-07-15 RX ADMIN — FAMOTIDINE 20 MG: 20 TABLET ORAL at 09:20

## 2018-07-15 RX ADMIN — ASPIRIN 81 MG: 81 TABLET, COATED ORAL at 09:20

## 2018-07-15 RX ADMIN — CLINDAMYCIN PHOSPHATE 600 MG: 12 INJECTION, SOLUTION INTRAMUSCULAR; INTRAVENOUS at 14:22

## 2018-07-15 RX ADMIN — CALCIUM 500 MG: 500 TABLET ORAL at 09:20

## 2018-07-15 ASSESSMENT — PAIN SCALES - GENERAL
PAINLEVEL_OUTOF10: 0

## 2018-07-15 NOTE — PROGRESS NOTES
BP 84/50 MAP 61  HR 75  Pt in bed semifowlers  Pt reports increased lethargy  Dr notified of BP and pt symptom  NS bolus infusing per order

## 2018-07-15 NOTE — PROGRESS NOTES
Addendum to Resident progress Note  Pt seen,examined and evaluated independently. I have reviewed the current history, physical findings, labs and assessment and plan and agree with note as documented by resident MD on our rounds. Pt with hypotension.  Stop ace  Fluid bolus of 250 mls ns  Keep on heparin gtt  Decision regarding afib DANA CV in am or outpt to be decided in am      Maria Guadalupe Goins MD  Hospitalist Service

## 2018-07-15 NOTE — PROGRESS NOTES
Internal Medicine PGY-1 Resident Progress Note      PCP: David Hastings MD    Date of Admission: 7/13/2018    Chief Complaint: Ressie Ajit, feeling gassy, not right\" found to be in new onset A-fib ED    Hospital Course: Patient found to have new onset A-fib in ED after feeling nauseous. Started on heparin drip, Plavix held, continued on home statin, and cardiology consulted. Echocardiogram pending. Cellulitis of bilateral lower extremities, given doxycycline x 1 in the ED, started on clindamycin. Her metoprolol tartrate was increased from her home dose 12.5 mg BID to 25 mg BID for better BP control. Subjective: No overnight events. This AM BP 84/50, 1 L NS ordered; repeat BP 96/56. Patient denies lethargy, chest pain, shortness of breath, dizziness, generalized pain. She does not feel nauseous. She is no longer dribbling urine but frequently tries to use the restroom. She does not want to leave the hospital because, \" I can't take six steps to the bathroom. \" No urgency, dysuria, hematuria.        Medications:  Reviewed    Infusion Medications    heparin (porcine) 14 Units/kg/hr (07/15/18 8196)     Scheduled Medications    metoprolol tartrate  25 mg Oral BID    simvastatin  20 mg Oral Nightly    calcium elemental  1 tablet Oral Daily    donepezil  10 mg Oral Nightly    aspirin  81 mg Oral Daily    acetaminophen  650 mg Oral Nightly    sodium chloride flush  10 mL Intravenous 2 times per day    famotidine  20 mg Oral Daily    clindamycin (CLEOCIN) IV  600 mg Intravenous Q8H     PRN Meds: heparin (porcine), heparin (porcine), sodium chloride flush, magnesium hydroxide, ondansetron      Intake/Output Summary (Last 24 hours) at 07/15/18 0783  Last data filed at 07/15/18 0716   Gross per 24 hour   Intake              480 ml   Output              201 ml   Net              279 ml       Physical Exam Performed:    /76   Pulse 101   Temp 98 °F (36.7 °C) (Oral)   Resp 16   Ht 4' 8\" (1.422 m)   Wt 107 lb 9.4 oz (48.8 kg)   SpO2 94%   BMI 24.12 kg/m²     General appearance:  No apparent distress, appears stated age and cooperative. HEENT:  Normal cephalic, atraumatic without obvious deformity. Extra ocular muscles intact. Conjunctivae/corneas clear. Neck: Supple, with full range of motion. JVD + (2 cm above sternal notch approximately). Trachea midline. Respiratory:  Normal respiratory effort. Clear to auscultation, bilaterally without Rales/Wheezes/Rhonchi. Cardiovascular:  Regular rate but irregular rhythm with normal S1/S2 without murmurs, rubs or gallops. Abdomen: Soft, non-tender, non-distended with normal bowel sounds. Musculoskeletal:  No clubbing, cyanosis or edema bilaterally. Full range of motion without deformity. Skin: Greatly decreased in size lesions: now mildly erythematous, non painful rash of the lower extremities worse on right > left. Areas of redness do not cisco with pressure, not-raised or edematous. Neurologic:  Neurovascularly intact without any focal sensory/motor deficits. Psychiatric:  Alert and oriented, thought content appropriate, normal insight  Capillary Refill: Brisk,< 3 seconds   Peripheral Pulses: +2 palpable, equal bilaterally       Labs:   Recent Labs      07/13/18   1951 07/14/18   0303  07/15/18   0439   WBC  4.3  3.9*  3.6*   HGB  11.0*  10.4*  11.0*   HCT  32.9*  30.8*  32.2*   PLT  233  233  244     Recent Labs      07/13/18   1448  07/14/18   0303  07/15/18   0439   NA  143  143  142   K  4.3  4.4  4.5   CL  103  106  103   CO2  27  26  29   BUN  19  22*  22*   CREATININE  0.9  1.0  1.0   CALCIUM  9.1  8.9  9.3     Recent Labs      07/13/18   1448   AST  21   ALT  14   BILIDIR  <0.2   BILITOT  <0.2   ALKPHOS  46     No results for input(s): INR in the last 72 hours.   Recent Labs      07/13/18   1448  07/13/18   2332  07/14/18   0303   TROPONINI  <0.01  0.02*  0.01       Urinalysis:    Lab Results   Component Value Date    NITRU POSITIVE 02/07/2018    WBCUA  02/07/2018    BACTERIA 4+ 02/07/2018    RBCUA 0-2 02/07/2018    BLOODU TRACE-INTACT 02/07/2018    SPECGRAV 1.020 02/07/2018    GLUCOSEU Negative 02/07/2018       Radiology:  XR CHEST STANDARD (2 VW)   Final Result   Impression:       1. Evaluation of the left lung base is significantly limited given   presence of large hiatal hernia. I would not be able to exclude   pulmonary opacity or pleural effusion in the left lung base. 2. The right lung is clear. VL Extremity Venous Bilateral    (Results Pending)         Assessment/Plan:    Anabelle Potter, 80 y.o. female w/ PMH of CAD, HTN, HLD, hiatal hernia and recent UTI treated with Keflex that p/w nausea & urinary incontinence found to have new onset A-fib and skin rash of bilateral lower extremities. Active Hospital Problems    Diagnosis Date Noted    Atrial fibrillation ([facility] [address] [I48.91]     Cellulitis [L03.90]      New onset atrial fibrillation   -No history of arrhthymias, but PMH of CAD, HTN, HLD. Echocardiogram 2/8/18: normal LV with EF of 55%, but dilated LA, mild regurgitation of mitral, tricuspid, and aortic valves. No wall abnormalities.  Takes metoprolol tartrate 12.5 mg BID, benazepril 10 mg daily, asa 81 mg daily, simvastatin 20 mg daily, benazepril 10 mg daily, clopidogrel 75 mg daily.     [id number] Score for Atrial Fibrillation Stroke Risk    Risk   Factors   Component Value   C CHF Yes 1   H HTN Yes 1   A2 Age >= 76 Yes,  (80 y.o.) 2   D DM No 0   S2 Prior Stroke/TIA No 0   V Vascular Disease Yes 1   A Age 74-69 No,  (80 y.o.) 0   Sc Sex female 1     [id number]  Score   6   Score last updated 7/13/18 8:49 PM     -Continue weight based high dose heparin   -Will need NOAC before discharge   -Cardiology: deciding between DANA and cardioversion Monday vs 4 weeks of anticoagulation and cardioversion as outpatient  -Continue ASA 81 mg   -Hold Clopidogrel 75 mg qday   -Echocardiogram follow up   -Telemetry   -Cardiology consult

## 2018-07-15 NOTE — PROGRESS NOTES
[address] 81   Cardiology Progress Note     Admit Date: 2018     Reason for follow up: Atrial fibrillation     HPI and Interval History:   Patient seen and examined. Clinical notes reviewed. Telemetry reviewed. No new complaint today. No major events overnight. Denies having chest pain, shortness of breath, dyspnea on exertion, Orthopnea, PND at the time of this visit. Slept well. Feels fine    Review of System:  All other systems reviewed and are negative except for that noted above. Pertinent negatives are:     · General: negative for fever, chills   · Ophthalmic ROS: negative for - eye pain or loss of vision  · ENT ROS: negative for - headaches, sore throat   · Respiratory: negative for - cough, sputum  · Cardiovascular: Reviewed in HPI  · Gastrointestinal: negative for - abdominal pain, diarrhea, N/V  · Hematology: negative for - bleeding, blood clots, bruising or jaundice  · Genito-Urinary:  negative for - Dysuria or incontinence  · Musculoskeletal: negative for - Joint swelling, muscle pain  · Neurological: negative for - confusion, dizziness, headaches   · Psychiatric: No anxiety, no depression. · Dermatological: negative for - rash      Physical Examination:  Vitals:    07/15/18 0800   BP: 114/79   Pulse: 96   Resp: 18   Temp: 97.6 °F (36.4 °C)   SpO2: 94%      In: 120 [P.O.:120]  Out: 201    Wt Readings from Last 3 Encounters:   07/15/18 107 lb 9.4 oz (48.8 kg)   18 107 lb (48.5 kg)   18 103 lb (46.7 kg)     Temp  Av.7 °F (36.5 °C)  Min: 97.4 °F (36.3 °C)  Max: 98 °F (36.7 °C)  Pulse  Av.6  Min: 96  Max: 136  BP  Min: 103/59  Max: 130/76  SpO2  Av.6 %  Min: 94 %  Max: 97 %    Intake/Output Summary (Last 24 hours) at 07/15/18 0823  Last data filed at 07/15/18 0716   Gross per 24 hour   Intake              480 ml   Output              201 ml   Net              279 ml     · Telemetry:  Afib    · Constitutional: No major distress.   underweight  · Head: Atraumatic. · Mouth/Throat: Oropharynx is clear     · Eyes: Conjunctivae normal. EOM are normal.   · Neck: Neck supple. No JVD present. · Cardiovascular: Normal/fast rate irregular rhythm  , S1&S2. · Pulmonary/Chest: Bilateral respiratory sounds. reduced breath sound bilaterally   · Abdominal: soft. bowel sounds present, No tenderness   · Musculoskeletal: No edema  Rt leg, mildly tender  · Lymphadenopathy: No obvious adenopathy noted. · Neurological: Alert and oriented. Moves all extremities, Follows command, No Gross deficit   · Skin:redness lower leg ankle, Rt leg, mildly tender - Chronic skin discoloration   · Psychiatric: Normal behavior    Labs:  Reviewed. Recent Labs      07/13/18   1448  07/14/18   0303  07/15/18   0439   NA  143  143  142   K  4.3  4.4  4.5   CL  103  106  103   CO2  27  26  29   BUN  19  22*  22*   CREATININE  0.9  1.0  1.0     Recent Labs      07/13/18   1951  07/14/18   0303  07/15/18   0439   WBC  4.3  3.9*  3.6*   HGB  11.0*  10.4*  11.0*   HCT  32.9*  30.8*  32.2*   MCV  89.9  88.6  88.5   PLT  233  233  244     Lab Results   Component Value Date    TROPONINI 0.01 07/14/2018     CrCl cannot be calculated (Unknown ideal weight.).    Lab Results   Component Value Date    .0 11/07/2012     No results found for: PROTIME, INR  Lab Results   Component Value Date    CHOL 121 02/08/2018    HDL 58 02/08/2018    HDL 64 04/20/2012    TRIG 106 02/08/2018       Scheduled Meds:   lisinopril  5 mg Oral Daily    metoprolol tartrate  25 mg Oral BID    simvastatin  20 mg Oral Nightly    calcium elemental  1 tablet Oral Daily    donepezil  10 mg Oral Nightly    aspirin  81 mg Oral Daily    acetaminophen  650 mg Oral Nightly    sodium chloride flush  10 mL Intravenous 2 times per day    famotidine  20 mg Oral Daily    clindamycin (CLEOCIN) IV  600 mg Intravenous Q8H     Continuous Infusions:   heparin (porcine) 14 Units/kg/hr (07/15/18 0549)     PRN Meds:heparin (porcine), heparin (porcine), sodium chloride flush, magnesium hydroxide, ondansetron     Diagnostic and imaging results reviewed. Patient Active Problem List    Diagnosis Date Noted    Hypotension due to drugs 05/24/2018     Priority: High    At[facility]     Cellulitis     Nonrheumatic aortic valve insufficiency 03/06/2018    NSTEMI (non-ST elevated myocardial infarction) ([facility] [facility] 02/08/2018    Osteoporosis 08/14/2013    Scoliosis 08/14/2013    Memory difficulties 08/14/2013    Depressive disorder 02/08/2013    Macular degeneration of both eyes     CAD (coronary artery disease)     Hyperlipidemia     Hiatal hernia     Benign essential HTN 08/24/2011      Active Hospital Problems    Diagnosis Date Noted    Atrial fibrillation ([facility] [I48.91]     Cellulitis [L03.90]        Assessment: Plan:     -   - new onset Atrial fibrillation, duration unknown              Agree with heparin as [id number] score is 6              Will need NOAC before discharge              DANA and cardioversion on Monday vs 4 weeks of anticoagulation and cardioversion as outpatient   Given her age and lack of symptoms , a rate control strategy may also be reasonable               HR is still high ,change dose of beta-blocker to 37.5 mg bid                   - HTN              BP is well controlled. Continue current meds.      - HLD              On zocor     - LE rash/cellulitis              On antibiotics             NOTE: This report was transcribed using voice recognition software. Every effort was made to ensure accuracy, however, inadvertent computerized transcription errors may be present.      Wan Lopez MD, [facility] [place], [facility]   Office: [phone]

## 2018-07-16 ENCOUNTER — APPOINTMENT (OUTPATIENT)
Dept: VASCULAR LAB | Age: 83
DRG: 308 | End: 2018-07-16
Payer: MEDICARE

## 2018-07-16 LAB
ANION GAP SERPL CALCULATED.3IONS-SCNC: 10 MMOL/L (ref 3–16)
ANTI-XA UNFRAC HEPARIN: 0.38 IU/ML (ref 0.3–0.7)
ANTI-XA UNFRAC HEPARIN: 0.4 IU/ML (ref 0.3–0.7)
BASOPHILS ABSOLUTE: 0.1 K/UL (ref 0–0.2)
BASOPHILS RELATIVE PERCENT: 1.4 %
BUN BLDV-MCNC: 19 MG/DL (ref 7–20)
CALCIUM SERPL-MCNC: 8.8 MG/DL (ref 8.3–10.6)
CHLORIDE BLD-SCNC: 105 MMOL/L (ref 99–110)
CO2: 27 MMOL/L (ref 21–32)
CREAT SERPL-MCNC: 0.9 MG/DL (ref 0.6–1.2)
EKG ATRIAL RATE: 61 BPM
EKG DIAGNOSIS: NORMAL
EKG P AXIS: 57 DEGREES
EKG P-R INTERVAL: 152 MS
EKG Q-T INTERVAL: 424 MS
EKG QRS DURATION: 74 MS
EKG QTC CALCULATION (BAZETT): 426 MS
EKG R AXIS: 36 DEGREES
EKG T AXIS: 84 DEGREES
EKG VENTRICULAR RATE: 61 BPM
EOSINOPHILS ABSOLUTE: 0.1 K/UL (ref 0–0.6)
EOSINOPHILS RELATIVE PERCENT: 2.7 %
GFR AFRICAN AMERICAN: >60
GFR NON-AFRICAN AMERICAN: 58
GLUCOSE BLD-MCNC: 90 MG/DL (ref 70–99)
HCT VFR BLD CALC: 29.6 % (ref 36–48)
HEMOGLOBIN: 10.1 G/DL (ref 12–16)
LV EF: 58 %
LVEF MODALITY: NORMAL
LYMPHOCYTES ABSOLUTE: 0.8 K/UL (ref 1–5.1)
LYMPHOCYTES RELATIVE PERCENT: 18.7 %
MCH RBC QN AUTO: 30 PG (ref 26–34)
MCHC RBC AUTO-ENTMCNC: 34 G/DL (ref 31–36)
MCV RBC AUTO: 88.1 FL (ref 80–100)
MONOCYTES ABSOLUTE: 0.4 K/UL (ref 0–1.3)
MONOCYTES RELATIVE PERCENT: 8.1 %
NEUTROPHILS ABSOLUTE: 3 K/UL (ref 1.7–7.7)
NEUTROPHILS RELATIVE PERCENT: 69.1 %
PDW BLD-RTO: 13.3 % (ref 12.4–15.4)
PLATELET # BLD: 221 K/UL (ref 135–450)
PMV BLD AUTO: 8.4 FL (ref 5–10.5)
POTASSIUM REFLEX MAGNESIUM: 4.1 MMOL/L (ref 3.5–5.1)
RBC # BLD: 3.36 M/UL (ref 4–5.2)
SODIUM BLD-SCNC: 142 MMOL/L (ref 136–145)
WBC # BLD: 4.4 K/UL (ref 4–11)

## 2018-07-16 PROCEDURE — 36415 COLL VENOUS BLD VENIPUNCTURE: CPT

## 2018-07-16 PROCEDURE — 1200000000 HC SEMI PRIVATE

## 2018-07-16 PROCEDURE — 6370000000 HC RX 637 (ALT 250 FOR IP): Performed by: STUDENT IN AN ORGANIZED HEALTH CARE EDUCATION/TRAINING PROGRAM

## 2018-07-16 PROCEDURE — 93306 TTE W/DOPPLER COMPLETE: CPT | Performed by: INTERNAL MEDICINE

## 2018-07-16 PROCEDURE — 97535 SELF CARE MNGMENT TRAINING: CPT

## 2018-07-16 PROCEDURE — 2500000003 HC RX 250 WO HCPCS: Performed by: STUDENT IN AN ORGANIZED HEALTH CARE EDUCATION/TRAINING PROGRAM

## 2018-07-16 PROCEDURE — 80048 BASIC METABOLIC PNL TOTAL CA: CPT

## 2018-07-16 PROCEDURE — 97166 OT EVAL MOD COMPLEX 45 MIN: CPT

## 2018-07-16 PROCEDURE — G8987 SELF CARE CURRENT STATUS: HCPCS

## 2018-07-16 PROCEDURE — 85520 HEPARIN ASSAY: CPT

## 2018-07-16 PROCEDURE — 93970 EXTREMITY STUDY: CPT

## 2018-07-16 PROCEDURE — 85025 COMPLETE CBC W/AUTO DIFF WBC: CPT

## 2018-07-16 PROCEDURE — 2580000003 HC RX 258: Performed by: STUDENT IN AN ORGANIZED HEALTH CARE EDUCATION/TRAINING PROGRAM

## 2018-07-16 PROCEDURE — 93005 ELECTROCARDIOGRAM TRACING: CPT | Performed by: INTERNAL MEDICINE

## 2018-07-16 PROCEDURE — G8988 SELF CARE GOAL STATUS: HCPCS

## 2018-07-16 PROCEDURE — 6370000000 HC RX 637 (ALT 250 FOR IP): Performed by: INTERNAL MEDICINE

## 2018-07-16 PROCEDURE — 99233 SBSQ HOSP IP/OBS HIGH 50: CPT | Performed by: INTERNAL MEDICINE

## 2018-07-16 RX ORDER — 0.9 % SODIUM CHLORIDE 0.9 %
250 INTRAVENOUS SOLUTION INTRAVENOUS ONCE
Status: DISCONTINUED | OUTPATIENT
Start: 2018-07-16 | End: 2018-07-16

## 2018-07-16 RX ORDER — LACTOBACILLUS RHAMNOSUS GG 10B CELL
1 CAPSULE ORAL 2 TIMES DAILY
Status: DISCONTINUED | OUTPATIENT
Start: 2018-07-16 | End: 2018-07-18 | Stop reason: HOSPADM

## 2018-07-16 RX ADMIN — APIXABAN 2.5 MG: 2.5 TABLET, FILM COATED ORAL at 17:30

## 2018-07-16 RX ADMIN — Medication 1 CAPSULE: at 21:37

## 2018-07-16 RX ADMIN — SODIUM CHLORIDE 250 ML: 900 INJECTION, SOLUTION INTRAVENOUS at 00:08

## 2018-07-16 RX ADMIN — METOPROLOL TARTRATE 25 MG: 25 TABLET, FILM COATED ORAL at 13:57

## 2018-07-16 RX ADMIN — Medication 10 ML: at 21:38

## 2018-07-16 RX ADMIN — ASPIRIN 81 MG: 81 TABLET, COATED ORAL at 10:01

## 2018-07-16 RX ADMIN — CLINDAMYCIN PHOSPHATE 600 MG: 12 INJECTION, SOLUTION INTRAMUSCULAR; INTRAVENOUS at 13:57

## 2018-07-16 RX ADMIN — Medication 12.5 MG: at 21:37

## 2018-07-16 RX ADMIN — Medication 1 CAPSULE: at 13:56

## 2018-07-16 RX ADMIN — DONEPEZIL HYDROCHLORIDE 10 MG: 10 TABLET, FILM COATED ORAL at 21:38

## 2018-07-16 RX ADMIN — FAMOTIDINE 20 MG: 20 TABLET ORAL at 10:01

## 2018-07-16 RX ADMIN — CALCIUM 500 MG: 500 TABLET ORAL at 10:01

## 2018-07-16 RX ADMIN — ACETAMINOPHEN 650 MG: 325 TABLET, FILM COATED ORAL at 21:36

## 2018-07-16 RX ADMIN — CLINDAMYCIN PHOSPHATE 600 MG: 12 INJECTION, SOLUTION INTRAMUSCULAR; INTRAVENOUS at 05:41

## 2018-07-16 RX ADMIN — SIMVASTATIN 20 MG: 20 TABLET, FILM COATED ORAL at 21:37

## 2018-07-16 RX ADMIN — CLINDAMYCIN PHOSPHATE 600 MG: 12 INJECTION, SOLUTION INTRAMUSCULAR; INTRAVENOUS at 23:07

## 2018-07-16 ASSESSMENT — PAIN SCALES - GENERAL
PAINLEVEL_OUTOF10: 0

## 2018-07-16 NOTE — PROGRESS NOTES
Internal Medicine PGY-1 Resident Progress Note      PCP: Marisel Martinez MD    Date of Admission: 7/13/2018    Chief Complaint: Nando Grullon, feeling gassy, not right\" found to be in new onset A-fib ED    Hospital Course: Patient found to have new onset A-fib in ED after feeling nauseous. Started on heparin drip, Plavix held, continued on home statin, and cardiology consulted. Echocardiogram pending. Cellulitis of bilateral lower extremities, given doxycycline x 1 in the ED, started on clindamycin. Her metoprolol tartrate was increased from her home dose 12.5 mg BID to 25 mg BID for better BP control. Subjective: Patient converted to NS last night. Blood pressures continue to be soft despite fluids:  Patient denies lethargy, chest pain, shortness of breath, dizziness, generalized pain. She does not feel nauseous. She is no longer dribbling urine but frequently tries to use the restroom. She does not want to leave the hospital because, \" I can't take six steps to the bathroom. \" No urgency, dysuria, hematuria.      Medications:  Reviewed    Infusion Medications    heparin (porcine) 12 Units/kg/hr (07/15/18 1143)     Scheduled Medications    metoprolol tartrate  25 mg Oral BID    simvastatin  20 mg Oral Nightly    calcium elemental  1 tablet Oral Daily    donepezil  10 mg Oral Nightly    aspirin  81 mg Oral Daily    acetaminophen  650 mg Oral Nightly    sodium chloride flush  10 mL Intravenous 2 times per day    famotidine  20 mg Oral Daily    clindamycin (CLEOCIN) IV  600 mg Intravenous Q8H     PRN Meds: heparin (porcine), heparin (porcine), sodium chloride flush, magnesium hydroxide, ondansetron      Intake/Output Summary (Last 24 hours) at 07/16/18 0816  Last data filed at 07/16/18 0541   Gross per 24 hour   Intake             1120 ml   Output              302 ml   Net              818 ml       Physical Exam Performed:    BP (!) 90/48   Pulse 67   Temp 97 °F (36.1 °C) (Oral)   Resp 16   Ht 4' 8\" (1.422 m) Wt 107 lb 9.4 oz (48.8 kg)   SpO2 95%   BMI 24.12 kg/m²     General appearance:  No apparent distress, appears stated age and cooperative. HEENT:  Normal cephalic, atraumatic without obvious deformity. Extra ocular muscles intact. Conjunctivae/corneas clear. Neck: Supple, with full range of motion. JVD+. Trachea midline. Respiratory:  Normal respiratory effort. Clear to auscultation, bilaterally without Rales/Wheezes/Rhonchi. Cardiovascular:  Regular rate and rhythm with normal S1/S2 without murmurs, rubs or gallops. Abdomen: Soft, non-tender, non-distended with normal bowel sounds. Musculoskeletal:  No clubbing, cyanosis or edema bilaterally. Full range of motion without deformity. Skin: Greatly decreased in size lesions: now mildly erythematous, non painful rash of the lower extremities worse on right > left. Areas of redness do not cisco with pressure, not-raised or edematous. Neurologic:  Neurovascularly intact without any focal sensory/motor deficits. Psychiatric:  Alert and oriented, thought content appropriate, normal insight  Capillary Refill: Brisk,< 3 seconds   Peripheral Pulses: +2 palpable, equal bilaterally       Labs:   Recent Labs      07/14/18   0303  07/15/18   0439  07/16/18   0457   WBC  3.9*  3.6*  4.4   HGB  10.4*  11.0*  10.1*   HCT  30.8*  32.2*  29.6*   PLT  233  244  221     Recent Labs      07/14/18   0303  07/15/18   0439  07/16/18   0457   NA  143  142  142   K  4.4  4.5  4.1   CL  106  103  105   CO2  26  29  27   BUN  22*  22*  19   CREATININE  1.0  1.0  0.9   CALCIUM  8.9  9.3  8.8     Recent Labs      07/13/18   1448   AST  21   ALT  14   BILIDIR  <0.2   BILITOT  <0.2   ALKPHOS  46     No results for input(s): INR in the last 72 hours.   Recent Labs      07/13/18   1448  07/13/18   2332  07/14/18   0303   TROPONINI  <0.01  0.02*  0.01       Urinalysis:      Lab Results   Component Value Date    NITRU POSITIVE 02/07/2018    WBCUA  02/07/2018    BACTERIA 4+

## 2018-07-16 NOTE — PROGRESS NOTES
[address] Daily Progress Note      Admit Date:  7/13/2018    Subjective:  Ms. Ruby Stinson was seen and examined. F/U Afib/AI/HTN. Feeling better currently.   No chest pain/sob at rest.     Objective:   BP (!) 94/47   Pulse 67   Temp 96.6 °F (35.9 °C) (Oral)   Resp 18   Ht 4' 8\" (1.422 m)   Wt 107 lb 9.4 oz (48.8 kg)   SpO2 96%   BMI 24.12 kg/m²     Intake/Output Summary (Last 24 hours) at 07/16/18 1145  Last data filed at 07/16/18 0926   Gross per 24 hour   Intake              920 ml   Output              300 ml   Net              620 ml       TELEMETRY: Sinus     Physical Exam:  General:  Awake, alert, NAD  Skin:  Warm and dry  Neck:  JVP difficult to assess  Chest: decreased BS, respiration normal  Cardiovascular:  RRR S1S2  Abdomen:  Soft nontender  Extremities:  no edema    Medications:    lactobacillus  1 capsule Oral BID    sodium chloride  250 mL Intravenous Once    metoprolol tartrate  25 mg Oral BID    simvastatin  20 mg Oral Nightly    calcium elemental  1 tablet Oral Daily    donepezil  10 mg Oral Nightly    aspirin  81 mg Oral Daily    acetaminophen  650 mg Oral Nightly    sodium chloride flush  10 mL Intravenous 2 times per day    famotidine  20 mg Oral Daily    clindamycin (CLEOCIN) IV  600 mg Intravenous Q8H      heparin (porcine) 12 Units/kg/hr (07/15/18 1143)     heparin (porcine), heparin (porcine), sodium chloride flush, magnesium hydroxide, ondansetron    Lab Data:  CBC: Recent Labs      07/14/18   0303  07/15/18   0439  07/16/18   0457   WBC  3.9*  3.6*  4.4   HGB  10.4*  11.0*  10.1*   HCT  30.8*  32.2*  29.6*   MCV  88.6  88.5  88.1   PLT  233  244  221     BMP: Recent Labs      07/14/18   0303  07/15/18   0439  07/16/18   0457   NA  143  142  142   K  4.4  4.5  4.1   CL  106  103  105   CO2  26  29  27   BUN  22*  22*  19   CREATININE  1.0  1.0  0.9     LIVER PROFILE:   Recent Labs      07/13/18   1448   AST  21   ALT  14   LIPASE  30.0   BILIDIR  <0.2   BILITOT <0. 2   ALKPHOS  46     PT/INR: No results for input(s): PROTIME, INR in the last 72 hours. APTT:   Recent Labs      07/13/18 1951   APTT  30.1     BNP:  No results for input(s): BNP in the last 72 hours. IMAGING:     Assessment:  Patient Active Problem List    Diagnosis Date Noted    Hypotension due to drugs 05/24/2018     Priority: High    Nonrheumatic aortic valve insufficiency 03/06/2018     Priority: Low    NSTEMI (non-ST elevated myocardial infarction) ([facility] [address] 02/08/2018     Priority: Low    Osteoporosis 08/14/2013     Priority: Low    Scoliosis 08/14/2013     Priority: Low    Memory difficulties 08/14/2013     Priority: Low    Depressive disorder 02/08/2013     Priority: Low    Macular degeneration of both eyes      Priority: Low    CAD (coronary artery disease)      Priority: Low    Hyperlipidemia      Priority: Low    Hiatal hernia      Priority: Low    Benign essential HTN 08/24/2011     Priority: Low    Atrial fibrillation (HCC)     Cellulitis        Plan:  1. Converted to sinus overnight. No complaints this am.  BP marginal overnight. Continue metoprolol with hold parameters. On heparin. AC on discharge.        Core Measures:  · Discharge instructions:   · LVEF documented:   · ACEI for LV dysfunction:   · Smoking Cessation:    Malinda Lopez MD 7/16/2018 11:45 AM

## 2018-07-16 NOTE — PLAN OF CARE
Problem: Pain:  Goal: Pain level will decrease  Pain level will decrease   Outcome: Ongoing  Pt reports no pain this shift. Will continue to monitor. Problem: Cardiac:  Goal: Ability to maintain an adequate cardiac output will improve  Ability to maintain an adequate cardiac output will improve   Outcome: Ongoing  Pt is on a heparin drip, rate controlled. Cardiology to discuss transition to oral anticoagulant. Problem: Falls - Risk of:  Goal: Will remain free from falls  Will remain free from falls   Outcome: Ongoing  Pt is on bed with the bed alarm on, call light is within reach.

## 2018-07-16 NOTE — PROGRESS NOTES
Alone  Type of Home: Assisted living ([facility] )  Home Layout: One level  Home Access: Elevator  Bathroom Shower/Tub: Walk-in shower  Bathroom Toilet: Standard  Bathroom Equipment: Grab bars in shower, Hand-held shower, Shower chair, Grab bars around toilet  Home Equipment: 4 wheeled walker, Rolling walker, Alert [employer] Help From: Personal care attendant (aide is with her from 12-4 each day.  )  ADL Assistance: Independent (typically has spvn for showers)  Homemaking Assistance: Needs assistance (3 meals provided per day either to her room if ordered up or she goes to [employer]. )  Homemaking Responsibilities: No  Ambulation Assistance: Independent (with 4WW inside, 2WW if she leaves)  Transfer Assistance: Independent  Active : No  Patient's  Info: driving service provided by the facility   Occupation: Retired  Leisure & Hobbies: watching TV and reading  Additional Comments: pt reports no falls in the past 6 months. Pt reports sleeping in flat bed with several pillows to prop her up. Objective  Treatment included: functional transfer training, ADL, pt education       Orientation  Overall Orientation Status: Within Functional Limits     Balance  Sitting Balance: Independent  Standing Balance: Stand by assistance  Standing Balance  Time: 5 min   Activity: functional mobility to/from sink  for grooming in stance, mobility to stretcher in hallway; rolling walker  Comment: Pt requires CGA for effortful sit>stand from chair, CGA initially for gait into bathroom with min cues not to  rolling walker. Pt progresses to SBA for stance at sink, mobility bathroom to stretcher in hallman. CGA, min cues stand to sit. pt sets walker aside when approaching transfer surface.    Toilet Transfers  Toilet Transfer: Contact guard assistance  Toilet Transfers Comments: simulated, min cues, walker  ADL  Feeding: Independent (wash face, brush teeth at sink with SBA for stance)  Grooming: Independent  LE

## 2018-07-16 NOTE — PLAN OF CARE
Problem: Musculor/Skeletal Functional Status  Intervention: OT Evaluation/treatment  Increase independence with functional transfers and ADLs.

## 2018-07-17 ENCOUNTER — APPOINTMENT (OUTPATIENT)
Dept: GENERAL RADIOLOGY | Age: 83
DRG: 308 | End: 2018-07-17
Payer: MEDICARE

## 2018-07-17 LAB
ANION GAP SERPL CALCULATED.3IONS-SCNC: 10 MMOL/L (ref 3–16)
BASOPHILS ABSOLUTE: 0 K/UL (ref 0–0.2)
BASOPHILS RELATIVE PERCENT: 0.9 %
BILIRUBIN URINE: NEGATIVE
BLOOD, URINE: NEGATIVE
BUN BLDV-MCNC: 19 MG/DL (ref 7–20)
CALCIUM SERPL-MCNC: 9.3 MG/DL (ref 8.3–10.6)
CHLORIDE BLD-SCNC: 106 MMOL/L (ref 99–110)
CLARITY: CLEAR
CO2: 29 MMOL/L (ref 21–32)
COLOR: YELLOW
CREAT SERPL-MCNC: 0.9 MG/DL (ref 0.6–1.2)
EOSINOPHILS ABSOLUTE: 0.1 K/UL (ref 0–0.6)
EOSINOPHILS RELATIVE PERCENT: 3.1 %
GFR AFRICAN AMERICAN: >60
GFR NON-AFRICAN AMERICAN: 58
GLUCOSE BLD-MCNC: 89 MG/DL (ref 70–99)
GLUCOSE URINE: NEGATIVE MG/DL
HCT VFR BLD CALC: 30.8 % (ref 36–48)
HEMOGLOBIN: 10.3 G/DL (ref 12–16)
KETONES, URINE: NEGATIVE MG/DL
LEUKOCYTE ESTERASE, URINE: NEGATIVE
LYMPHOCYTES ABSOLUTE: 0.7 K/UL (ref 1–5.1)
LYMPHOCYTES RELATIVE PERCENT: 18.9 %
MCH RBC QN AUTO: 30 PG (ref 26–34)
MCHC RBC AUTO-ENTMCNC: 33.6 G/DL (ref 31–36)
MCV RBC AUTO: 89.2 FL (ref 80–100)
MICROSCOPIC EXAMINATION: NORMAL
MONOCYTES ABSOLUTE: 0.3 K/UL (ref 0–1.3)
MONOCYTES RELATIVE PERCENT: 8.6 %
NEUTROPHILS ABSOLUTE: 2.6 K/UL (ref 1.7–7.7)
NEUTROPHILS RELATIVE PERCENT: 68.5 %
NITRITE, URINE: NEGATIVE
PDW BLD-RTO: 13.4 % (ref 12.4–15.4)
PH UA: 6.5
PLATELET # BLD: 244 K/UL (ref 135–450)
PMV BLD AUTO: 7.7 FL (ref 5–10.5)
POTASSIUM REFLEX MAGNESIUM: 4.3 MMOL/L (ref 3.5–5.1)
PRO-BNP: 1421 PG/ML (ref 0–449)
PROTEIN UA: NEGATIVE MG/DL
RBC # BLD: 3.45 M/UL (ref 4–5.2)
SODIUM BLD-SCNC: 145 MMOL/L (ref 136–145)
SPECIFIC GRAVITY UA: 1.02
URINE REFLEX TO CULTURE: NORMAL
URINE TYPE: NORMAL
UROBILINOGEN, URINE: 0.2 E.U./DL
WBC # BLD: 3.8 K/UL (ref 4–11)

## 2018-07-17 PROCEDURE — 97116 GAIT TRAINING THERAPY: CPT

## 2018-07-17 PROCEDURE — 80048 BASIC METABOLIC PNL TOTAL CA: CPT

## 2018-07-17 PROCEDURE — 71046 X-RAY EXAM CHEST 2 VIEWS: CPT

## 2018-07-17 PROCEDURE — 83880 ASSAY OF NATRIURETIC PEPTIDE: CPT

## 2018-07-17 PROCEDURE — 2500000003 HC RX 250 WO HCPCS: Performed by: STUDENT IN AN ORGANIZED HEALTH CARE EDUCATION/TRAINING PROGRAM

## 2018-07-17 PROCEDURE — 81003 URINALYSIS AUTO W/O SCOPE: CPT

## 2018-07-17 PROCEDURE — 6370000000 HC RX 637 (ALT 250 FOR IP): Performed by: INTERNAL MEDICINE

## 2018-07-17 PROCEDURE — 97530 THERAPEUTIC ACTIVITIES: CPT

## 2018-07-17 PROCEDURE — 1200000000 HC SEMI PRIVATE

## 2018-07-17 PROCEDURE — 2580000003 HC RX 258: Performed by: STUDENT IN AN ORGANIZED HEALTH CARE EDUCATION/TRAINING PROGRAM

## 2018-07-17 PROCEDURE — 85025 COMPLETE CBC W/AUTO DIFF WBC: CPT

## 2018-07-17 PROCEDURE — 99233 SBSQ HOSP IP/OBS HIGH 50: CPT | Performed by: INTERNAL MEDICINE

## 2018-07-17 PROCEDURE — 36415 COLL VENOUS BLD VENIPUNCTURE: CPT

## 2018-07-17 PROCEDURE — 6370000000 HC RX 637 (ALT 250 FOR IP): Performed by: STUDENT IN AN ORGANIZED HEALTH CARE EDUCATION/TRAINING PROGRAM

## 2018-07-17 RX ADMIN — Medication 12.5 MG: at 09:15

## 2018-07-17 RX ADMIN — ASPIRIN 81 MG: 81 TABLET, COATED ORAL at 09:14

## 2018-07-17 RX ADMIN — DONEPEZIL HYDROCHLORIDE 10 MG: 10 TABLET, FILM COATED ORAL at 20:35

## 2018-07-17 RX ADMIN — APIXABAN 2.5 MG: 2.5 TABLET, FILM COATED ORAL at 20:36

## 2018-07-17 RX ADMIN — CALCIUM 500 MG: 500 TABLET ORAL at 09:14

## 2018-07-17 RX ADMIN — Medication 12.5 MG: at 20:35

## 2018-07-17 RX ADMIN — FAMOTIDINE 20 MG: 20 TABLET ORAL at 09:14

## 2018-07-17 RX ADMIN — Medication 10 ML: at 20:36

## 2018-07-17 RX ADMIN — CLINDAMYCIN PHOSPHATE 600 MG: 12 INJECTION, SOLUTION INTRAMUSCULAR; INTRAVENOUS at 14:22

## 2018-07-17 RX ADMIN — SIMVASTATIN 20 MG: 20 TABLET, FILM COATED ORAL at 20:36

## 2018-07-17 RX ADMIN — Medication 1 CAPSULE: at 20:36

## 2018-07-17 RX ADMIN — APIXABAN 2.5 MG: 2.5 TABLET, FILM COATED ORAL at 09:14

## 2018-07-17 RX ADMIN — Medication 1 CAPSULE: at 09:15

## 2018-07-17 RX ADMIN — Medication 10 ML: at 09:15

## 2018-07-17 RX ADMIN — ACETAMINOPHEN 650 MG: 325 TABLET, FILM COATED ORAL at 20:36

## 2018-07-17 RX ADMIN — CLINDAMYCIN PHOSPHATE 600 MG: 12 INJECTION, SOLUTION INTRAMUSCULAR; INTRAVENOUS at 23:39

## 2018-07-17 RX ADMIN — CLINDAMYCIN PHOSPHATE 600 MG: 12 INJECTION, SOLUTION INTRAMUSCULAR; INTRAVENOUS at 06:18

## 2018-07-17 ASSESSMENT — PAIN SCALES - GENERAL
PAINLEVEL_OUTOF10: 0

## 2018-07-17 NOTE — PROGRESS NOTES
Ht 4' 8\" (1.422 m)   Wt 107 lb 2.3 oz (48.6 kg)   SpO2 94%   BMI 24.02 kg/m²     General appearance:  No apparent distress, appears stated age and cooperative. HEENT:  Normal cephalic, atraumatic without obvious deformity. Extra ocular muscles intact. Conjunctivae/corneas clear. Neck: Supple, with full range of motion. JVD+. Trachea midline. Respiratory:  Normal respiratory effort. Faint crackles on lower left lung base; otherwise clear to auscultation, bilaterally without Rales/Wheezes/Rhonchi. Cardiovascular:  Regular rate and rhythm with normal S1/S2 without murmurs, rubs or gallops. Abdomen: Soft, non-tender, non-distended with normal bowel sounds. Musculoskeletal:  No clubbing, cyanosis or edema bilaterally. Full range of motion without deformity. Skin: Greatly decreased in size lesions: now mildly erythematous, non painful rash of the lower extremities worse on right > left. Areas of redness do not cisco with pressure, not-raised or edematous. Neurologic:  Neurovascularly intact without any focal sensory/motor deficits. Psychiatric:  Alert and oriented, thought content appropriate, normal insight  Capillary Refill: Brisk,< 3 seconds   Peripheral Pulses: +2 palpable, equal bilaterally       Labs:   Recent Labs      07/15/18   0439  07/16/18   0457  07/17/18   0512   WBC  3.6*  4.4  3.8*   HGB  11.0*  10.1*  10.3*   HCT  32.2*  29.6*  30.8*   PLT  244  221  244     Recent Labs      07/15/18   0439  07/16/18   0457  07/17/18   0512   NA  142  142  145   K  4.5  4.1  4.3   CL  103  105  106   CO2  29  27  29   BUN  22*  19  19   CREATININE  1.0  0.9  0.9   CALCIUM  9.3  8.8  9.3     No results for input(s): AST, ALT, BILIDIR, BILITOT, ALKPHOS in the last 72 hours. No results for input(s): INR in the last 72 hours. No results for input(s): Vinod Christine in the last 72 hours.     Urinalysis:      Lab Results   Component Value Date    NITRU POSITIVE 02/07/2018    WBCUA  02/07/2018 Megan Guajardo MD    I will discuss the patient with the senior resident and Dr. Leonel Ch. Maribell Camacho MD  Internal Medicine Resident PGY-1  Pager: [phone]        Patient seen and examined, plan of care discussed with residents. Agree with their assessment and plan with following addendum:  Patient is going back and forth with her decision about returning home vs SNF. Also c/o confusion and dysponea today, CXR stable and UA is clear. No evidence of acute CHF. Cont with eliquis and low dose metoprolol. SW will talk to her about discharge dispo for tmrw.        Donnie Ridley

## 2018-07-18 VITALS
RESPIRATION RATE: 16 BRPM | HEART RATE: 66 BPM | DIASTOLIC BLOOD PRESSURE: 65 MMHG | TEMPERATURE: 97.5 F | BODY MASS INDEX: 24 KG/M2 | SYSTOLIC BLOOD PRESSURE: 159 MMHG | WEIGHT: 106.7 LBS | OXYGEN SATURATION: 97 % | HEIGHT: 56 IN

## 2018-07-18 LAB
ANION GAP SERPL CALCULATED.3IONS-SCNC: 9 MMOL/L (ref 3–16)
BASOPHILS ABSOLUTE: 0 K/UL (ref 0–0.2)
BASOPHILS RELATIVE PERCENT: 0.9 %
BUN BLDV-MCNC: 19 MG/DL (ref 7–20)
CALCIUM SERPL-MCNC: 9.2 MG/DL (ref 8.3–10.6)
CHLORIDE BLD-SCNC: 104 MMOL/L (ref 99–110)
CO2: 28 MMOL/L (ref 21–32)
CREAT SERPL-MCNC: 0.9 MG/DL (ref 0.6–1.2)
EOSINOPHILS ABSOLUTE: 0.1 K/UL (ref 0–0.6)
EOSINOPHILS RELATIVE PERCENT: 2.7 %
FERRITIN: 34.3 NG/ML (ref 15–150)
FOLATE: 16.2 NG/ML (ref 4.78–24.2)
GFR AFRICAN AMERICAN: >60
GFR NON-AFRICAN AMERICAN: 58
GLUCOSE BLD-MCNC: 87 MG/DL (ref 70–99)
HCT VFR BLD CALC: 31.3 % (ref 36–48)
HEMOGLOBIN: 9.9 G/DL (ref 12–16)
IRON SATURATION: 16 % (ref 15–50)
IRON: 51 UG/DL (ref 37–145)
LYMPHOCYTES ABSOLUTE: 0.7 K/UL (ref 1–5.1)
LYMPHOCYTES RELATIVE PERCENT: 18.5 %
MCH RBC QN AUTO: 30 PG (ref 26–34)
MCHC RBC AUTO-ENTMCNC: 31.5 G/DL (ref 31–36)
MCV RBC AUTO: 95.4 FL (ref 80–100)
MONOCYTES ABSOLUTE: 0.4 K/UL (ref 0–1.3)
MONOCYTES RELATIVE PERCENT: 9.7 %
NEUTROPHILS ABSOLUTE: 2.6 K/UL (ref 1.7–7.7)
NEUTROPHILS RELATIVE PERCENT: 68.2 %
PDW BLD-RTO: 14.5 % (ref 12.4–15.4)
PLATELET # BLD: 223 K/UL (ref 135–450)
PMV BLD AUTO: 7.6 FL (ref 5–10.5)
POTASSIUM REFLEX MAGNESIUM: 4.5 MMOL/L (ref 3.5–5.1)
RBC # BLD: 3.28 M/UL (ref 4–5.2)
SODIUM BLD-SCNC: 141 MMOL/L (ref 136–145)
TOTAL IRON BINDING CAPACITY: 317 UG/DL (ref 260–445)
VITAMIN B-12: 368 PG/ML (ref 211–911)
WBC # BLD: 3.9 K/UL (ref 4–11)

## 2018-07-18 PROCEDURE — 36415 COLL VENOUS BLD VENIPUNCTURE: CPT

## 2018-07-18 PROCEDURE — 2580000003 HC RX 258: Performed by: STUDENT IN AN ORGANIZED HEALTH CARE EDUCATION/TRAINING PROGRAM

## 2018-07-18 PROCEDURE — 82728 ASSAY OF FERRITIN: CPT

## 2018-07-18 PROCEDURE — 83540 ASSAY OF IRON: CPT

## 2018-07-18 PROCEDURE — 82607 VITAMIN B-12: CPT

## 2018-07-18 PROCEDURE — 2500000003 HC RX 250 WO HCPCS: Performed by: STUDENT IN AN ORGANIZED HEALTH CARE EDUCATION/TRAINING PROGRAM

## 2018-07-18 PROCEDURE — 85025 COMPLETE CBC W/AUTO DIFF WBC: CPT

## 2018-07-18 PROCEDURE — 82746 ASSAY OF FOLIC ACID SERUM: CPT

## 2018-07-18 PROCEDURE — 6370000000 HC RX 637 (ALT 250 FOR IP): Performed by: INTERNAL MEDICINE

## 2018-07-18 PROCEDURE — 97535 SELF CARE MNGMENT TRAINING: CPT

## 2018-07-18 PROCEDURE — 80048 BASIC METABOLIC PNL TOTAL CA: CPT

## 2018-07-18 PROCEDURE — 6370000000 HC RX 637 (ALT 250 FOR IP): Performed by: STUDENT IN AN ORGANIZED HEALTH CARE EDUCATION/TRAINING PROGRAM

## 2018-07-18 PROCEDURE — 83550 IRON BINDING TEST: CPT

## 2018-07-18 RX ORDER — CLINDAMYCIN HYDROCHLORIDE 300 MG/1
300 CAPSULE ORAL 3 TIMES DAILY
Qty: 3 CAPSULE | Refills: 0 | Status: SHIPPED | OUTPATIENT
Start: 2018-07-18 | End: 2018-10-29 | Stop reason: SDUPTHER

## 2018-07-18 RX ORDER — NITROGLYCERIN 0.4 MG/1
TABLET SUBLINGUAL
Qty: 25 TABLET | Refills: 3 | Status: SHIPPED | OUTPATIENT
Start: 2018-07-18 | End: 2018-07-18

## 2018-07-18 RX ORDER — NITROGLYCERIN 0.4 MG/1
TABLET SUBLINGUAL
Qty: 25 TABLET | Refills: 3 | Status: SHIPPED | OUTPATIENT
Start: 2018-07-18 | End: 2019-03-19 | Stop reason: CLARIF

## 2018-07-18 RX ORDER — FAMOTIDINE 20 MG/1
20 TABLET, FILM COATED ORAL DAILY
Qty: 60 TABLET | Refills: 3 | Status: SHIPPED | OUTPATIENT
Start: 2018-07-19 | End: 2018-09-07 | Stop reason: SDUPTHER

## 2018-07-18 RX ORDER — LACTOBACILLUS RHAMNOSUS GG 10B CELL
1 CAPSULE ORAL 2 TIMES DAILY
Qty: 28 CAPSULE | Refills: 0 | Status: SHIPPED | OUTPATIENT
Start: 2018-07-18 | End: 2019-02-04

## 2018-07-18 RX ORDER — LOPERAMIDE HYDROCHLORIDE 2 MG/1
2 CAPSULE ORAL 3 TIMES DAILY PRN
Qty: 21 CAPSULE | Refills: 0 | Status: SHIPPED | OUTPATIENT
Start: 2018-07-18 | End: 2018-07-18 | Stop reason: HOSPADM

## 2018-07-18 RX ADMIN — FAMOTIDINE 20 MG: 20 TABLET ORAL at 09:41

## 2018-07-18 RX ADMIN — CALCIUM 500 MG: 500 TABLET ORAL at 09:41

## 2018-07-18 RX ADMIN — Medication 1 CAPSULE: at 09:41

## 2018-07-18 RX ADMIN — Medication 12.5 MG: at 09:41

## 2018-07-18 RX ADMIN — ASPIRIN 81 MG: 81 TABLET, COATED ORAL at 09:41

## 2018-07-18 RX ADMIN — CLINDAMYCIN PHOSPHATE 600 MG: 12 INJECTION, SOLUTION INTRAMUSCULAR; INTRAVENOUS at 06:35

## 2018-07-18 RX ADMIN — Medication 10 ML: at 09:50

## 2018-07-18 RX ADMIN — APIXABAN 2.5 MG: 2.5 TABLET, FILM COATED ORAL at 09:40

## 2018-07-18 ASSESSMENT — PAIN SCALES - GENERAL
PAINLEVEL_OUTOF10: 0
PAINLEVEL_OUTOF10: 0

## 2018-07-18 NOTE — PROGRESS NOTES
Pt has been discharged to home. IV removed. Explained medications and care to patient and caregiver. Caregiver verbalized understanding of instructions.

## 2018-07-18 NOTE — PLAN OF CARE
Problem: Pain:  Goal: Pain level will decrease  Pain level will decrease   Outcome: Completed Date Met: 07/18/18  Pt has reported no pain this shift. Pain scale explained. Problem: Cardiac:  Goal: Ability to maintain an adequate cardiac output will improve  Ability to maintain an adequate cardiac output will improve   Outcome: Completed Date Met: 07/18/18  Pt's heart rate has remained under control. She is being treated with metoprolol and apaxiban. Intervention: Monitor cardiovascular status  Pt's heart rate has remained under control. She is being treated with apaxiban and metoprolol. Problem: Falls - Risk of:  Goal: Will remain free from falls  Will remain free from falls   Outcome: Ongoing  Pt has remained free from falls. Patient's bed/chair alarm is on and call light is within reach.

## 2018-07-18 NOTE — CARE COORDINATION
2018  [facility] Case Management Department    Patient: Dawson Brewer  MRN: [id number] / : 1923  ACCT: [de-identified]     Emergency Contacts  Contact 1: Name: Samir Burr  Contact 1: Number: [phone]    Admission Documentation  Attending Provider: Merna Courtney MD  Admit date/time: 2018  6:07 PM  Status: Inpatient  Diagnosis: A-fib ([address]     Readmission within last 30 days:  no     Living Situation  Discharge Planning  Living Arrangements: Alone  Support Systems: Friends/Neighbors  Potential Assistance Needed: Home Care  Type of Home Care Services: [address]  Patient expects to be discharged to[de-identified] home  Expected Discharge Date: 07/15/18    Service Assessment:       Values / Beliefs  Do you have any ethnic, cultural, sacramental, or spiritual Jew needs you would like us to be aware of while you are in the hospital?: No    Advance Directives (For Healthcare)  Pre-existing DNR Comfort Care/DNR Arrest/DNI Order: No  Healthcare Directive: Yes, patient has an advance directive for healthcare treatment  Type of Healthcare Directive: Durable power of  for health care, Living will  Copy in Chart: Yes, copy in chart  Chart Copy Status [de-identified] Active      Pharmacy:    Potential Assistance Purchasing Medications:  No  Does patient want to participate in local refill/meds to beds program?: Yes    Notification completed in HENS/PAS? No     Discharge disposition: Home [facility] at [facility] [address] aware of D/C to home today and will restart home care. Orders faxed to [facility] at [phone]. Lizzie and her family were provided with choice of provider; she and her family are in agreement with the discharge plan. Case management has presented and reviewed IMM letter #2 to the patient. Patient verbalized understanding of their medicare rights and appeal process if needed.  Patient has signed, initials and placed today's date(2018)
to[de-identified] home  Patient plans for SNF: NO    Mode of transport from hospital: Family     Barriers to discharge: no major barriers identified in regards to D/C     Aldo Deng RN  [facility]  Case Management Department  Ph: [phone] Fax: [phone]

## 2018-07-18 NOTE — PROGRESS NOTES
Internal Medicine PGY-1 Resident Progress Note      PCP: Ramiro Iyer MD    Date of Admission: 7/13/2018    Chief Complaint: Balm Spittle, feeling gassy, not right\" found to be in new onset A-fib ED    Hospital Course: Patient found to have new onset A-fib in ED after feeling nauseous. Started on heparin drip, Plavix held, continued on home statin, and cardiology consulted. Echocardiogram pending. Cellulitis of bilateral lower extremities, given doxycycline x 1 in the ED, started on clindamycin. Her metoprolol tartrate was increased from her home dose 12.5 mg BID to 25 mg BID for better BP control. Subjective: No overnight events. She feels better about going home today. Patient denies lethargy, chest pain, increased shortness of breath, dizziness, generalized pain. She is eating, drinking, and urinating well. Good BM's. She still feels mildly confused, and this is about the same, however she says that she has gotten little sleep last night, which is not helping her confusion.      Medications:  Reviewed    Infusion Medications     Scheduled Medications    lactobacillus  1 capsule Oral BID    metoprolol tartrate  12.5 mg Oral BID    apixaban  2.5 mg Oral BID    simvastatin  20 mg Oral Nightly    calcium elemental  1 tablet Oral Daily    donepezil  10 mg Oral Nightly    aspirin  81 mg Oral Daily    acetaminophen  650 mg Oral Nightly    sodium chloride flush  10 mL Intravenous 2 times per day    famotidine  20 mg Oral Daily    clindamycin (CLEOCIN) IV  600 mg Intravenous Q8H     PRN Meds: sodium chloride flush, magnesium hydroxide, ondansetron      Intake/Output Summary (Last 24 hours) at 07/18/18 1042  Last data filed at 07/18/18 0941   Gross per 24 hour   Intake           418.22 ml   Output              960 ml   Net          -541.78 ml       Physical Exam Performed:    BP (!) 115/56   Pulse 64   Temp 97 °F (36.1 °C) (Oral)   Resp 18   Ht 4' 8\" (1.422 m)   Wt 106 lb 11.2 oz (48.4 kg)   SpO2 95% kg)  -Telemetry   -Outpatient Cardiology     Acute exacerbation of HFpEF   Nausea, feeling increased shortness of breath and fatigue. Age 80 as risk factor, BNP 5,395.   -Repeat CXR: \"small bilateral pleural effusions with associated ild bibasilar atelectasis\"   -Likely secondary to patient's chronic posture. No WBC, fever, increase in BNP, decreased recent mobility.  -Repeat BNP: 1421  -Echocardiogram (above)  -Tele   -continue beta blocker     CAD  -Restart ASA 81 mg outpatient     HTN  Benign essential HTN outpatient history and chronic issue. Her pressures have been low for the past 1-2 days. We will lower the metoprolol dose and hold as needed, given 0.5 L today with increase in pressures. - Metoprolol tartrate 12.5 mg BID  -Hold home benazepril 10 mg daily (has been hypotensive during stay)    Cellulitis  1 day presentation of bilateral lower extremity rash. Erythematous, non-raised, non-targetoid, non-blanching. Painful only with firm touch, no edema, urticaria. DDX includes cellulitis, Keflex-related rash, DVT's.   -Blood cultures x 2 negative. Significant improvement since admission, no pain or swelling. Mild erythema currently.   -Clindamycin 600 mg Q 8 H (avoiding penicillins due to unknown allergy, cephalosporins due to recent Keflex use) - day six of therapy. Will need one more day of antibiotics on discharge  -Elevate extremities   -No SCD's at this time   -Probiotics      UTI  Recent UTI, placed on Keflex 500 mg BID on 7/8 for UTI on an outpatient visit. Has finished 5 days of antibiotics New onset \"constant dribbling\" for the last three weeks. History of bladder \"suspension\" in 2008 and \"tuck\". UA in ED + nitrite, bacteria 4+, WBC's . Urine culture from [facility] 7/9/18 grew E. Coli >100,000 CFU, pan-sensitive.   -stopped Keflex  -Repeat UA given reported confusion was negative for leukocyte esterase and nitrites, bacteria. Unlikely UTI at present 7/17. Asymptomatic urinary symptoms.

## 2018-07-19 ENCOUNTER — CARE COORDINATION (OUTPATIENT)
Dept: CASE MANAGEMENT | Age: 83
End: 2018-07-19

## 2018-07-19 DIAGNOSIS — I48.91 ATRIAL FIBRILLATION, UNSPECIFIED TYPE (HCC): Primary | ICD-10-CM

## 2018-07-19 LAB
BLOOD CULTURE, ROUTINE: NORMAL
CULTURE, BLOOD 2: NORMAL

## 2018-07-19 NOTE — CARE COORDINATION
[facility] Transitions Initial Follow Up Call    Call within 2 business days of discharge: Yes    Patient: Vanna Watson Patient : [id number]   MRN: [id number]  Reason for Admission: at fib, cellulitis   Discharge Date: 18 RARS: Readmission Risk Score: 15     Spoke with: Vanna Watson and personal care aideLorraine    Facility: [facility]    Non-face-to-face services provided:  Scheduled appointment with PCP-Dr Boy Contreras and reviewed discharge summary and/or continuity of care documents  Education of patient/family/caregiver/guardian to support self-management-Review safety  Assessment and support for treatment adherence and medication management-med rec 1111F completed    Care Transitions 24 Hour Call    Were you discharged with any Home Care or Post Acute Services:  Yes  Post Acute Services:  Home Health (Comment: [facility])  Patient DME:  Bozena Sessions lift, Shower chair, Other  Other Patient DME:  grab bars  Do you have support at home?:  Other Caregiver, Alone  Are you an active caregiver in your home?:  No  Care Transitions Interventions     Care Transition post hospital f/u call to pt home. Spoke with pt and then aide. Pt said she is doing okay. No sob or chest pain. Asked pt about her legs and she said she can't see very well and asked aide who said her legs looked \"pink\". No falls since home. [facility] Nurse, Carey Peirre, was seeing pt just before CTN call. Med rec 1111F completed with personal care aide who assists pt with meds. No discrepancy. Pt has the Eliquis and the Clopidigrel was set aside. NTG SL ordered in place of NTG patch. Pt has all new meds and changes made. Pt needs F/U appt with Dr Kayode Carmichael and pt agreed for CTN to schedule. Scheduled for next  1pm. Aide to accompany pt to appt. [facility] transition nurse following pt.    Follow Up  Future Appointments  Date Time Provider Lucille Herrera   2018 1:00 PM Van Mcdaniel MD [facility] IM [facility] 9/11/2018 3:00 PM MD Fitz Sewell Card [facility] Nadeem Holder [facility] Transition Coordinator  [phone]  [email or web address]

## 2018-07-24 ENCOUNTER — TELEPHONE (OUTPATIENT)
Dept: INTERNAL MEDICINE | Age: 83
End: 2018-07-24

## 2018-07-24 NOTE — TELEPHONE ENCOUNTER
[facility] calling regarding the following;    Requesting an Rx that was given from the hospital.    Eliquis 2.5 MG bid - Dr. Sunny Harrison     Please advise

## 2018-07-27 ENCOUNTER — OFFICE VISIT (OUTPATIENT)
Dept: INTERNAL MEDICINE | Age: 83
End: 2018-07-27

## 2018-07-27 VITALS
HEART RATE: 64 BPM | DIASTOLIC BLOOD PRESSURE: 60 MMHG | BODY MASS INDEX: 24.52 KG/M2 | OXYGEN SATURATION: 96 % | HEIGHT: 56 IN | WEIGHT: 109 LBS | SYSTOLIC BLOOD PRESSURE: 138 MMHG

## 2018-07-27 DIAGNOSIS — I10 BENIGN ESSENTIAL HTN: ICD-10-CM

## 2018-07-27 DIAGNOSIS — I48.91 ATRIAL FIBRILLATION, UNSPECIFIED TYPE (HCC): ICD-10-CM

## 2018-07-27 DIAGNOSIS — L03.119 CELLULITIS OF LOWER EXTREMITY, UNSPECIFIED LATERALITY: ICD-10-CM

## 2018-07-27 DIAGNOSIS — I51.89 DIASTOLIC DYSFUNCTION: ICD-10-CM

## 2018-07-27 DIAGNOSIS — R01.1 HEART MURMUR: ICD-10-CM

## 2018-07-27 DIAGNOSIS — Z09 HOSPITAL DISCHARGE FOLLOW-UP: ICD-10-CM

## 2018-07-27 PROBLEM — I35.1 NONRHEUMATIC AORTIC VALVE INSUFFICIENCY: Status: RESOLVED | Noted: 2018-03-06 | Resolved: 2018-07-27

## 2018-07-27 PROBLEM — I95.2 HYPOTENSION DUE TO DRUGS: Status: RESOLVED | Noted: 2018-05-24 | Resolved: 2018-07-27

## 2018-07-27 PROCEDURE — 99496 TRANSJ CARE MGMT HIGH F2F 7D: CPT | Performed by: INTERNAL MEDICINE

## 2018-07-27 ASSESSMENT — ENCOUNTER SYMPTOMS
SHORTNESS OF BREATH: 0
GASTROINTESTINAL NEGATIVE: 1

## 2018-07-27 NOTE — ASSESSMENT & PLAN NOTE
Grade 2 per echocardiogram.  Patient was clinically in some failure upon admission but she is also in atrial fibrillation. Clinically improved at this time.

## 2018-07-27 NOTE — PROGRESS NOTES
SUBJECTIVE:    Patient ID: Roberto Alcazar is an 80 y.o. female. HPI: Patient here today for the f/u of chronic problems -- see Problem List and associated comments. New issues or complaints include (also see Assessment for more details):  Status post hospitalization. Patient had some breathing issues was taken to the ER. She had new onset atrial fibrillation and grade 2 diastolic dysfunction with preserved EF. She also has cellulitis of her legs. All was clinically resolved. She feels well at this time without any breathing problems, no pain, and she is back to her normal lifestyle. She is compliant with her new medications. Review of Systems   Constitutional: Negative. HENT: Negative. Respiratory: Negative for shortness of breath. Cardiovascular: Negative for chest pain and palpitations. Gastrointestinal: Negative. Genitourinary: Negative for dysuria. Musculoskeletal: Positive for arthralgias. Skin: Negative for rash and wound. Neurological: Negative for dizziness and light-headedness. Psychiatric/Behavioral: Negative. OBJECTIVE:    /60 (Site: Left Arm, Position: Sitting, Cuff Size: Medium Adult)   Pulse 64   Ht 4' 8\" (1.422 m)   Wt 109 lb (49.4 kg)   SpO2 96%   BMI 24.44 kg/m²      Physical Exam   Constitutional: She is oriented to person, place, and time. She appears well-developed and well-nourished. No distress. Using a walker   HENT:   Hard of hearing   Neck: No JVD present. Carotid bruit is not present (radiated murmur). Cardiovascular: Normal rate, regular rhythm and intact distal pulses. Exam reveals no gallop. Murmur heard. Pulmonary/Chest: Effort normal and breath sounds normal. No stridor. No respiratory distress. She has no wheezes. She has no rales. Abdominal: Soft. Bowel sounds are normal.   Musculoskeletal: She exhibits no edema. Neurological: She is alert and oriented to person, place, and time. Skin: She is not diaphoretic.  No pallor. Psychiatric: Her speech is normal and behavior is normal. Judgment and thought content normal. Her mood appears not anxious. Cognition and memory are normal. She does not exhibit a depressed mood. ASSESSMENT:       Encounter Diagnoses   Name Primary?  Heart murmur     Diastolic dysfunction    [address] discharge follow-up     Atrial fibrillation, unspecified type ([address]     Cellulitis of lower extremity, unspecified laterality     Benign essential HTN        Heart murmur  MR, TR, and [facility] discharge follow-up  Hospitalization for new onset atrial fibrillation. Patient was asymptomatic. Patient also has cellulitis of her legs. Hospital records reviewed. She is now clinically much improved. Atrial fibrillation ([address]  New-onset noticed approximately one week agotreated in hospital.  Started on Eliquis and BP meds adjusted. Clinically and sinus rhythm now. Cellulitis  Resolve. Bilateral lower extremities treated with clindamycin    Benign essential HTN  BP okay    Diastolic dysfunction  Grade 2 per echocardiogram.  Patient was clinically in some failure upon admission but she is also in atrial fibrillation. Clinically improved at this time. PLAN:  See ASSESSMENT for evaluation & PLAN     No orders of the defined types were placed in this encounter. PSH, PMH, SH and FH reviewed and noted. Recent and past labs, tests and consults also reviewed. Recent or new meds also reviewed.

## 2018-08-26 PROBLEM — Z09 HOSPITAL DISCHARGE FOLLOW-UP: Status: RESOLVED | Noted: 2018-07-27 | Resolved: 2018-08-26

## 2018-09-04 ENCOUNTER — HOSPITAL ENCOUNTER (EMERGENCY)
Age: 83
Discharge: HOME | End: 2018-09-04
Attending: EMERGENCY MEDICINE
Payer: MEDICARE

## 2018-09-04 VITALS
HEART RATE: 85 BPM | RESPIRATION RATE: 18 BRPM | DIASTOLIC BLOOD PRESSURE: 65 MMHG | HEIGHT: 57 IN | OXYGEN SATURATION: 96 % | WEIGHT: 105 LBS | TEMPERATURE: 97.6 F | BODY MASS INDEX: 22.65 KG/M2 | SYSTOLIC BLOOD PRESSURE: 120 MMHG

## 2018-09-04 DIAGNOSIS — L23.9 ALLERGIC DERMATITIS: Primary | ICD-10-CM

## 2018-09-04 PROCEDURE — 99282 EMERGENCY DEPT VISIT SF MDM: CPT

## 2018-09-04 RX ORDER — DIAPER,BRIEF,INFANT-TODD,DISP
EACH MISCELLANEOUS
Qty: 1 TUBE | Refills: 1 | Status: SHIPPED | OUTPATIENT
Start: 2018-09-04 | End: 2018-09-11

## 2018-09-07 ENCOUNTER — TELEPHONE (OUTPATIENT)
Dept: INTERNAL MEDICINE CLINIC | Age: 83
End: 2018-09-07

## 2018-09-07 RX ORDER — FAMOTIDINE 20 MG/1
20 TABLET, FILM COATED ORAL DAILY
Qty: 90 TABLET | Refills: 3 | Status: SHIPPED | OUTPATIENT
Start: 2018-09-07 | End: 2019-05-30 | Stop reason: SDUPTHER

## 2018-09-10 ENCOUNTER — TELEPHONE (OUTPATIENT)
Dept: INTERNAL MEDICINE | Age: 83
End: 2018-09-10

## 2018-09-13 ENCOUNTER — TELEPHONE (OUTPATIENT)
Dept: INTERNAL MEDICINE | Age: 83
End: 2018-09-13

## 2018-09-13 NOTE — TELEPHONE ENCOUNTER
Pt calling in stating she is very weak and is not feeling good with no energy   Pt is aware there are no open apts

## 2018-09-17 ENCOUNTER — OFFICE VISIT (OUTPATIENT)
Dept: INTERNAL MEDICINE | Age: 83
End: 2018-09-17

## 2018-09-17 VITALS
WEIGHT: 112 LBS | OXYGEN SATURATION: 90 % | HEIGHT: 56 IN | DIASTOLIC BLOOD PRESSURE: 62 MMHG | BODY MASS INDEX: 25.19 KG/M2 | SYSTOLIC BLOOD PRESSURE: 130 MMHG | HEART RATE: 81 BPM

## 2018-09-17 DIAGNOSIS — I10 BENIGN ESSENTIAL HTN: ICD-10-CM

## 2018-09-17 DIAGNOSIS — Z23 NEED FOR INFLUENZA VACCINATION: Primary | ICD-10-CM

## 2018-09-17 DIAGNOSIS — D64.9 ANEMIA, UNSPECIFIED TYPE: ICD-10-CM

## 2018-09-17 DIAGNOSIS — R53.83 OTHER FATIGUE: ICD-10-CM

## 2018-09-17 DIAGNOSIS — I48.91 ATRIAL FIBRILLATION, UNSPECIFIED TYPE (HCC): ICD-10-CM

## 2018-09-17 LAB
A/G RATIO: 2.1 (ref 1.1–2.2)
ALBUMIN SERPL-MCNC: 4.4 G/DL (ref 3.4–5)
ALP BLD-CCNC: 53 U/L (ref 40–129)
ALT SERPL-CCNC: 11 U/L (ref 10–40)
ANION GAP SERPL CALCULATED.3IONS-SCNC: 11 MMOL/L (ref 3–16)
AST SERPL-CCNC: 19 U/L (ref 15–37)
BASOPHILS ABSOLUTE: 0 K/UL (ref 0–0.2)
BASOPHILS RELATIVE PERCENT: 1 %
BILIRUB SERPL-MCNC: <0.2 MG/DL (ref 0–1)
BUN BLDV-MCNC: 17 MG/DL (ref 7–20)
CALCIUM SERPL-MCNC: 9.5 MG/DL (ref 8.3–10.6)
CHLORIDE BLD-SCNC: 103 MMOL/L (ref 99–110)
CO2: 30 MMOL/L (ref 21–32)
CREAT SERPL-MCNC: 0.9 MG/DL (ref 0.6–1.2)
EOSINOPHILS ABSOLUTE: 0 K/UL (ref 0–0.6)
EOSINOPHILS RELATIVE PERCENT: 1.2 %
FERRITIN: 11.5 NG/ML (ref 15–150)
GFR AFRICAN AMERICAN: >60
GFR NON-AFRICAN AMERICAN: 58
GLOBULIN: 2.1 G/DL
GLUCOSE BLD-MCNC: 93 MG/DL (ref 70–99)
HCT VFR BLD CALC: 31.9 % (ref 36–48)
HEMOGLOBIN: 10.3 G/DL (ref 12–16)
IRON SATURATION: 9 % (ref 15–50)
IRON: 32 UG/DL (ref 37–145)
LYMPHOCYTES ABSOLUTE: 0.6 K/UL (ref 1–5.1)
LYMPHOCYTES RELATIVE PERCENT: 14.2 %
MCH RBC QN AUTO: 28.2 PG (ref 26–34)
MCHC RBC AUTO-ENTMCNC: 32.3 G/DL (ref 31–36)
MCV RBC AUTO: 87.4 FL (ref 80–100)
MONOCYTES ABSOLUTE: 0.4 K/UL (ref 0–1.3)
MONOCYTES RELATIVE PERCENT: 9.2 %
NEUTROPHILS ABSOLUTE: 2.9 K/UL (ref 1.7–7.7)
NEUTROPHILS RELATIVE PERCENT: 74.4 %
PDW BLD-RTO: 13.8 % (ref 12.4–15.4)
PLATELET # BLD: 277 K/UL (ref 135–450)
PMV BLD AUTO: 8.3 FL (ref 5–10.5)
POTASSIUM SERPL-SCNC: 4.1 MMOL/L (ref 3.5–5.1)
RBC # BLD: 3.66 M/UL (ref 4–5.2)
SODIUM BLD-SCNC: 144 MMOL/L (ref 136–145)
TOTAL IRON BINDING CAPACITY: 358 UG/DL (ref 260–445)
TOTAL PROTEIN: 6.5 G/DL (ref 6.4–8.2)
TSH REFLEX FT4: 2.48 UIU/ML (ref 0.27–4.2)
WBC # BLD: 4 K/UL (ref 4–11)

## 2018-09-17 PROCEDURE — 99214 OFFICE O/P EST MOD 30 MIN: CPT | Performed by: INTERNAL MEDICINE

## 2018-09-17 PROCEDURE — 1101F PT FALLS ASSESS-DOCD LE1/YR: CPT | Performed by: INTERNAL MEDICINE

## 2018-09-17 PROCEDURE — 4040F PNEUMOC VAC/ADMIN/RCVD: CPT | Performed by: INTERNAL MEDICINE

## 2018-09-17 PROCEDURE — 1123F ACP DISCUSS/DSCN MKR DOCD: CPT | Performed by: INTERNAL MEDICINE

## 2018-09-17 PROCEDURE — G0008 ADMIN INFLUENZA VIRUS VAC: HCPCS | Performed by: INTERNAL MEDICINE

## 2018-09-17 PROCEDURE — G8417 CALC BMI ABV UP PARAM F/U: HCPCS | Performed by: INTERNAL MEDICINE

## 2018-09-17 PROCEDURE — 1090F PRES/ABSN URINE INCON ASSESS: CPT | Performed by: INTERNAL MEDICINE

## 2018-09-17 PROCEDURE — 90662 IIV NO PRSV INCREASED AG IM: CPT | Performed by: INTERNAL MEDICINE

## 2018-09-17 PROCEDURE — 1036F TOBACCO NON-USER: CPT | Performed by: INTERNAL MEDICINE

## 2018-09-17 PROCEDURE — G8598 ASA/ANTIPLAT THER USED: HCPCS | Performed by: INTERNAL MEDICINE

## 2018-09-17 PROCEDURE — G8427 DOCREV CUR MEDS BY ELIG CLIN: HCPCS | Performed by: INTERNAL MEDICINE

## 2018-09-17 ASSESSMENT — ENCOUNTER SYMPTOMS
GASTROINTESTINAL NEGATIVE: 1
SHORTNESS OF BREATH: 0

## 2018-09-17 NOTE — PROGRESS NOTES
Kyphotic   Neurological: She is alert and oriented to person, place, and time. Skin: No rash noted. She is not diaphoretic. There is pallor. Psychiatric: She has a normal mood and affect. Her behavior is normal. Judgment and thought content normal.       ASSESSMENT:       Encounter Diagnoses   Name Primary?  Need for influenza vaccination Yes    Anemia, unspecified type     Other fatigue     Benign essential HTN     Atrial fibrillation, unspecified type ([address]        Anemia  Patient felt fatigue last week. Seems better now. She has a history of anemia. Recheck CBC. Benign essential HTN  BP okay    Atrial fibrillation (HCC)  Stable         PLAN:  See ASSESSMENT for evaluation & PLAN     Orders Placed This Encounter   Procedures    INFLUENZA, HIGH DOSE, 65 YRS +, IM, PF, PREFILL SYR, 0.5ML (FLUZONE HD)    CBC Auto Differential     Standing Status:   Future     Standing Expiration Date:   9/17/2019    Comprehensive Metabolic Panel     Standing Status:   Future     Standing Expiration Date:   9/17/2019    TSH WITH REFLEX TO FT4     Standing Status:   Future     Standing Expiration Date:   9/17/2019    Folate     Standing Status:   Future     Standing Expiration Date:   9/17/2019    Vitamin B12     Standing Status:   Future     Standing Expiration Date:   9/17/2019    Ferritin     Standing Status:   Future     Standing Expiration Date:   9/17/2019    Iron and TIBC     Standing Status:   Future     Standing Expiration Date:   9/17/2019     Order Specific Question:   Is Patient Fasting? Answer:   NA     Order Specific Question:   No of Hours? Answer:   PILY       PSH, PMH, SH and FH reviewed and noted. Recent and past labs, tests and consults also reviewed. Recent or new meds also reviewed.

## 2018-09-18 LAB
FOLATE: 17.44 NG/ML (ref 4.78–24.2)
VITAMIN B-12: 366 PG/ML (ref 211–911)

## 2018-09-19 ENCOUNTER — TELEPHONE (OUTPATIENT)
Dept: INTERNAL MEDICINE | Age: 83
End: 2018-09-19

## 2018-09-19 NOTE — TELEPHONE ENCOUNTER
Call returned to the patient. Patient notified. She states that it is sometimes sore in the morning near the lower leg and ankle but as she gets up and walking it gets better. She will try the heating pad.

## 2018-09-19 NOTE — TELEPHONE ENCOUNTER
Patient reports cramps between ankle and calf on left leg that's stopping her from getting around and she reports it hurting. She request a med be prescribed, please advise.     [address], [place] [address] [address]

## 2018-09-20 DIAGNOSIS — I21.4 NSTEMI (NON-ST ELEVATED MYOCARDIAL INFARCTION) (HCC): ICD-10-CM

## 2018-09-21 RX ORDER — SIMVASTATIN 20 MG
TABLET ORAL
Qty: 30 TABLET | Refills: 4 | Status: SHIPPED | OUTPATIENT
Start: 2018-09-21 | End: 2019-03-29 | Stop reason: SDUPTHER

## 2018-09-24 ENCOUNTER — TELEPHONE (OUTPATIENT)
Dept: INTERNAL MEDICINE CLINIC | Age: 83
End: 2018-09-24

## 2018-09-24 NOTE — TELEPHONE ENCOUNTER
Pt would like to know how much calcium carbonate 600 MG TABS tablet,  she is supposed to increase the dose?     Also pt states she is getting charley horse in legs, pt would like to know what all she can do besides drinking water

## 2018-10-03 ENCOUNTER — OFFICE VISIT (OUTPATIENT)
Dept: CARDIOLOGY CLINIC | Age: 83
End: 2018-10-03
Payer: MEDICARE

## 2018-10-03 VITALS
SYSTOLIC BLOOD PRESSURE: 106 MMHG | WEIGHT: 115.4 LBS | DIASTOLIC BLOOD PRESSURE: 54 MMHG | BODY MASS INDEX: 25.87 KG/M2 | HEART RATE: 70 BPM

## 2018-10-03 DIAGNOSIS — I51.89 DIASTOLIC DYSFUNCTION: ICD-10-CM

## 2018-10-03 DIAGNOSIS — I25.10 CORONARY ARTERY DISEASE INVOLVING NATIVE CORONARY ARTERY OF NATIVE HEART WITHOUT ANGINA PECTORIS: ICD-10-CM

## 2018-10-03 DIAGNOSIS — I10 BENIGN ESSENTIAL HTN: ICD-10-CM

## 2018-10-03 DIAGNOSIS — I48.91 ATRIAL FIBRILLATION, UNSPECIFIED TYPE (HCC): Primary | ICD-10-CM

## 2018-10-03 PROCEDURE — 1090F PRES/ABSN URINE INCON ASSESS: CPT | Performed by: NURSE PRACTITIONER

## 2018-10-03 PROCEDURE — 99214 OFFICE O/P EST MOD 30 MIN: CPT | Performed by: NURSE PRACTITIONER

## 2018-10-03 PROCEDURE — 1123F ACP DISCUSS/DSCN MKR DOCD: CPT | Performed by: NURSE PRACTITIONER

## 2018-10-03 PROCEDURE — G8427 DOCREV CUR MEDS BY ELIG CLIN: HCPCS | Performed by: NURSE PRACTITIONER

## 2018-10-03 PROCEDURE — 1101F PT FALLS ASSESS-DOCD LE1/YR: CPT | Performed by: NURSE PRACTITIONER

## 2018-10-03 PROCEDURE — G8417 CALC BMI ABV UP PARAM F/U: HCPCS | Performed by: NURSE PRACTITIONER

## 2018-10-03 PROCEDURE — G8598 ASA/ANTIPLAT THER USED: HCPCS | Performed by: NURSE PRACTITIONER

## 2018-10-03 PROCEDURE — 1036F TOBACCO NON-USER: CPT | Performed by: NURSE PRACTITIONER

## 2018-10-03 PROCEDURE — G8482 FLU IMMUNIZE ORDER/ADMIN: HCPCS | Performed by: NURSE PRACTITIONER

## 2018-10-03 PROCEDURE — 4040F PNEUMOC VAC/ADMIN/RCVD: CPT | Performed by: NURSE PRACTITIONER

## 2018-10-03 ASSESSMENT — ENCOUNTER SYMPTOMS
GASTROINTESTINAL NEGATIVE: 1
RESPIRATORY NEGATIVE: 1

## 2018-10-03 NOTE — PROGRESS NOTES
[address]      Primary Care Doctor:  Iesha Vallejo MD    Chief Complaint:  edema    History of Present Illness:  Jamil Herbert is a 80 y.o. female with PMH CAD, AF, HTN, HLD, HFpEF who presents today for an interval exam.  She has no cardiac complaints and denies chest pain, dyspnea, palpitations, orthopnea, PND, exertional chest pressure/discomfort, fatigue, early saiety, edema, syncope. EF: 55%    NYHA Class:   II   Class I   Patients with no limitation of activities; they suffer no symptoms from ordinary activities. Class II   Patients with slight, mild limitation of activity; they are comfortable with rest or with mild exertion. Class III   Patients with marked limitation of activity; they are comfortable only at rest.   Class IV   Patients who should be at complete rest, confined to bed or chair; any physical activity brings on discomfort and symptoms occur at rest.      Activity: at baseline  Can you walk 1-2 blocks or do a moderate amount of house/yard work? No          Past Medical History:   has a past medical history of CAD (coronary artery disease); Hiatal hernia; Hyperlipidemia; Hypertension; and Macular degeneration of both eyes. Surgical History:   has a past surgical history that includes Diagnostic Cardiac Cath Lab Procedure; Cholecystectomy; Appendectomy; Tonsillectomy and adenoidectomy; joint replacement; bladder repair (Bladder tuck); bladder suspension (2008); cardiovascular stress test (8/12); and doppler echocardiography (10/13). Social History:   reports that she has quit smoking. She has never used smokeless tobacco. She reports that she does not drink alcohol or use drugs. Family History:   Family History   Problem Relation Age of Onset    Heart Attack Mother     Heart Disease Mother     Heart Attack Father     Heart Disease Father     Other Daughter        Home Medications:  Prior to Admission medications    Medication Sig Start Date End Date Taking? Sleeps in a relciner at baseline   Abdominal: Soft. Musculoskeletal: Normal range of motion. She exhibits edema. Trace edema bilaterally   Neurological: She is alert and oriented to person, place, and time. Skin: Skin is warm and dry. Psychiatric: She has a normal mood and affect.  Her behavior is normal. Judgment and thought content normal.       Lab Data:    CBC:   Lab Results   Component Value Date    WBC 4.0 09/17/2018    WBC 3.9 07/18/2018    WBC 3.8 07/17/2018    RBC 3.66 09/17/2018    RBC 3.28 07/18/2018    RBC 3.45 07/17/2018    HGB 10.3 09/17/2018    HGB 9.9 07/18/2018    HGB 10.3 07/17/2018    HCT 31.9 09/17/2018    HCT 31.3 07/18/2018    HCT 30.8 07/17/2018    MCV 87.4 09/17/2018    MCV 95.4 07/18/2018    MCV 89.2 07/17/2018    RDW 13.8 09/17/2018    RDW 14.5 07/18/2018    RDW 13.4 07/17/2018     09/17/2018     07/18/2018     07/17/2018     BMP:  Lab Results   Component Value Date     09/17/2018     07/18/2018     07/17/2018    K 4.1 09/17/2018    K 4.5 07/18/2018    K 4.3 07/17/2018    K 4.1 07/16/2018     09/17/2018     07/18/2018     07/17/2018    CO2 30 09/17/2018    CO2 28 07/18/2018    CO2 29 07/17/2018    BUN 17 09/17/2018    BUN 19 07/18/2018    BUN 19 07/17/2018    CREATININE 0.9 09/17/2018    CREATININE 0.9 07/18/2018    CREATININE 0.9 07/17/2018     BNP:   Lab Results   Component Value Date    PROBNP 1,421 07/17/2018    PROBNP 5,395 07/13/2018    PROBNP 411 05/28/2018        Cardiac Imaging: Echo 7/16/18   Normal left ventricle size, wall thickness, and systolic function with an   estimated ejection fraction of 55-60%.   No regional wall motion abnormalities are seen.   Diastolic filling parameters suggest grade II diastolic dysfunction.   Moderate mitral regurgitation is present.   Mild aortic valve regurgitation.   Moderate tricuspid regurgitation.   Estimated pulmonary artery systolic pressure is elevated at 41mmHg

## 2018-10-29 ENCOUNTER — OFFICE VISIT (OUTPATIENT)
Dept: INTERNAL MEDICINE CLINIC | Age: 83
End: 2018-10-29
Payer: MEDICARE

## 2018-10-29 VITALS
SYSTOLIC BLOOD PRESSURE: 124 MMHG | WEIGHT: 117 LBS | BODY MASS INDEX: 25.24 KG/M2 | DIASTOLIC BLOOD PRESSURE: 70 MMHG | HEIGHT: 57 IN

## 2018-10-29 DIAGNOSIS — L53.9 ERYTHEMA OF LOWER EXTREMITY: ICD-10-CM

## 2018-10-29 DIAGNOSIS — I51.89 DIASTOLIC DYSFUNCTION: ICD-10-CM

## 2018-10-29 PROCEDURE — G8510 SCR DEP NEG, NO PLAN REQD: HCPCS | Performed by: INTERNAL MEDICINE

## 2018-10-29 PROCEDURE — G8417 CALC BMI ABV UP PARAM F/U: HCPCS | Performed by: INTERNAL MEDICINE

## 2018-10-29 PROCEDURE — G8598 ASA/ANTIPLAT THER USED: HCPCS | Performed by: INTERNAL MEDICINE

## 2018-10-29 PROCEDURE — 1123F ACP DISCUSS/DSCN MKR DOCD: CPT | Performed by: INTERNAL MEDICINE

## 2018-10-29 PROCEDURE — G8427 DOCREV CUR MEDS BY ELIG CLIN: HCPCS | Performed by: INTERNAL MEDICINE

## 2018-10-29 PROCEDURE — 1036F TOBACCO NON-USER: CPT | Performed by: INTERNAL MEDICINE

## 2018-10-29 PROCEDURE — 99214 OFFICE O/P EST MOD 30 MIN: CPT | Performed by: INTERNAL MEDICINE

## 2018-10-29 PROCEDURE — 1090F PRES/ABSN URINE INCON ASSESS: CPT | Performed by: INTERNAL MEDICINE

## 2018-10-29 PROCEDURE — 1101F PT FALLS ASSESS-DOCD LE1/YR: CPT | Performed by: INTERNAL MEDICINE

## 2018-10-29 PROCEDURE — G8482 FLU IMMUNIZE ORDER/ADMIN: HCPCS | Performed by: INTERNAL MEDICINE

## 2018-10-29 PROCEDURE — 4040F PNEUMOC VAC/ADMIN/RCVD: CPT | Performed by: INTERNAL MEDICINE

## 2018-10-29 PROCEDURE — 3288F FALL RISK ASSESSMENT DOCD: CPT | Performed by: INTERNAL MEDICINE

## 2018-10-29 RX ORDER — CLINDAMYCIN HYDROCHLORIDE 300 MG/1
300 CAPSULE ORAL 3 TIMES DAILY
Qty: 21 CAPSULE | Refills: 0 | Status: SHIPPED | OUTPATIENT
Start: 2018-10-29 | End: 2019-02-04

## 2018-10-29 ASSESSMENT — ENCOUNTER SYMPTOMS
SHORTNESS OF BREATH: 1
CHEST TIGHTNESS: 0
COLOR CHANGE: 1
WHEEZING: 0

## 2018-10-29 ASSESSMENT — PATIENT HEALTH QUESTIONNAIRE - PHQ9
SUM OF ALL RESPONSES TO PHQ QUESTIONS 1-9: 0
SUM OF ALL RESPONSES TO PHQ QUESTIONS 1-9: 0
SUM OF ALL RESPONSES TO PHQ9 QUESTIONS 1 & 2: 0
1. LITTLE INTEREST OR PLEASURE IN DOING THINGS: 0
2. FEELING DOWN, DEPRESSED OR HOPELESS: 0

## 2018-10-29 NOTE — PROGRESS NOTES
clindamycin. Keep elevated. Diastolic dysfunction  Exertional shortness of breath. Stable. PLAN:See ASSESSMENT for evaluation & PLAN     No orders of the defined types were placed in this encounter. PSH, PMH, SH and FH reviewed and noted. Recent and past labs, tests and consultsalso reviewed. Recent or new meds also reviewed.

## 2018-12-04 ENCOUNTER — OFFICE VISIT (OUTPATIENT)
Dept: CARDIOLOGY CLINIC | Age: 83
End: 2018-12-04
Payer: MEDICARE

## 2018-12-04 VITALS
SYSTOLIC BLOOD PRESSURE: 130 MMHG | WEIGHT: 117 LBS | DIASTOLIC BLOOD PRESSURE: 70 MMHG | HEART RATE: 60 BPM | BODY MASS INDEX: 25.32 KG/M2

## 2018-12-04 DIAGNOSIS — I10 ESSENTIAL HYPERTENSION: ICD-10-CM

## 2018-12-04 DIAGNOSIS — I25.10 CAD IN NATIVE ARTERY: ICD-10-CM

## 2018-12-04 DIAGNOSIS — I47.1 SVT (SUPRAVENTRICULAR TACHYCARDIA) (HCC): ICD-10-CM

## 2018-12-04 DIAGNOSIS — I34.0 NON-RHEUMATIC MITRAL REGURGITATION: ICD-10-CM

## 2018-12-04 DIAGNOSIS — I48.0 PAROXYSMAL ATRIAL FIBRILLATION (HCC): Primary | ICD-10-CM

## 2018-12-04 DIAGNOSIS — I35.1 NONRHEUMATIC AORTIC VALVE INSUFFICIENCY: ICD-10-CM

## 2018-12-04 PROCEDURE — 99214 OFFICE O/P EST MOD 30 MIN: CPT | Performed by: INTERNAL MEDICINE

## 2018-12-04 PROCEDURE — 1101F PT FALLS ASSESS-DOCD LE1/YR: CPT | Performed by: INTERNAL MEDICINE

## 2018-12-04 PROCEDURE — 4040F PNEUMOC VAC/ADMIN/RCVD: CPT | Performed by: INTERNAL MEDICINE

## 2018-12-04 PROCEDURE — G8417 CALC BMI ABV UP PARAM F/U: HCPCS | Performed by: INTERNAL MEDICINE

## 2018-12-04 PROCEDURE — 1036F TOBACCO NON-USER: CPT | Performed by: INTERNAL MEDICINE

## 2018-12-04 PROCEDURE — 1090F PRES/ABSN URINE INCON ASSESS: CPT | Performed by: INTERNAL MEDICINE

## 2018-12-04 PROCEDURE — 1123F ACP DISCUSS/DSCN MKR DOCD: CPT | Performed by: INTERNAL MEDICINE

## 2018-12-04 PROCEDURE — G8598 ASA/ANTIPLAT THER USED: HCPCS | Performed by: INTERNAL MEDICINE

## 2018-12-04 PROCEDURE — G8428 CUR MEDS NOT DOCUMENT: HCPCS | Performed by: INTERNAL MEDICINE

## 2018-12-04 PROCEDURE — G8482 FLU IMMUNIZE ORDER/ADMIN: HCPCS | Performed by: INTERNAL MEDICINE

## 2018-12-04 NOTE — PROGRESS NOTES
discharge. Left eye exhibits no discharge. Neck: Normal range of motion. No JVD present. Cardiovascular: Normal rate, regular rhythm, S1 normal, S2 normal and normal heart sounds. Exam reveals no gallop. 1/6 syst  murmur heard. Pulses:       Radial pulses are 2+ on the right side, and 2+ on the left side. Pulmonary/Chest: Effort normal and breath sounds normal. No respiratory distress. She has no wheezes. She has no rales. Abdominal: Soft. Bowel sounds are normal. No tenderness. Musculoskeletal: Normal range of motion. She exhibits tr edema. Neurological: She is alert and oriented to person, place, and time. Skin: Skin is warm and dry. Psychiatric: She has a normal mood and affect. Her behavior is normal. Thought content normal.       Assessment:       Diagnosis Orders   1. Paroxysmal atrial fibrillation (HCC)     2. CAD in native artery     3. SVT (supraventricular tachycardia) ([address]     4. Essential hypertension     5. Nonrheumatic aortic valve insufficiency     6. Non-rheumatic mitral regurgitation             Plan:      CV stable. No angina. BP is good. Rhythm stable. Remains compensated. On AC no bleeding issues. No changes. Reviewed previous records and testing including myoview 8/12 and echo 5/15. Echo shows MR/AI but she says she would never consider surgical intervention. Will continue to follow clinically. Continue medical therapy. Follow up 3 months .

## 2018-12-28 RX ORDER — APIXABAN 2.5 MG/1
TABLET, FILM COATED ORAL
Qty: 60 TABLET | Refills: 4 | Status: SHIPPED | OUTPATIENT
Start: 2018-12-28 | End: 2019-06-24 | Stop reason: SDUPTHER

## 2019-01-14 ENCOUNTER — TELEPHONE (OUTPATIENT)
Dept: CARDIOLOGY CLINIC | Age: 84
End: 2019-01-14

## 2019-01-14 DIAGNOSIS — I21.4 NSTEMI (NON-ST ELEVATED MYOCARDIAL INFARCTION) (HCC): ICD-10-CM

## 2019-01-25 RX ORDER — DONEPEZIL HYDROCHLORIDE 10 MG/1
10 TABLET, FILM COATED ORAL NIGHTLY
Qty: 30 TABLET | Refills: 2 | Status: SHIPPED | OUTPATIENT
Start: 2019-01-25 | End: 2019-05-30 | Stop reason: SDUPTHER

## 2019-02-04 ENCOUNTER — OFFICE VISIT (OUTPATIENT)
Dept: INTERNAL MEDICINE CLINIC | Age: 84
End: 2019-02-04
Payer: MEDICARE

## 2019-02-04 ENCOUNTER — TELEPHONE (OUTPATIENT)
Dept: INTERNAL MEDICINE CLINIC | Age: 84
End: 2019-02-04

## 2019-02-04 VITALS
HEIGHT: 57 IN | WEIGHT: 120 LBS | HEART RATE: 70 BPM | OXYGEN SATURATION: 90 % | SYSTOLIC BLOOD PRESSURE: 138 MMHG | DIASTOLIC BLOOD PRESSURE: 70 MMHG | BODY MASS INDEX: 25.89 KG/M2

## 2019-02-04 DIAGNOSIS — R20.0 LEFT LEG NUMBNESS: ICD-10-CM

## 2019-02-04 DIAGNOSIS — D64.9 ANEMIA, UNSPECIFIED TYPE: ICD-10-CM

## 2019-02-04 DIAGNOSIS — E78.5 HYPERLIPIDEMIA, UNSPECIFIED HYPERLIPIDEMIA TYPE: ICD-10-CM

## 2019-02-04 DIAGNOSIS — I25.10 CORONARY ARTERY DISEASE INVOLVING NATIVE CORONARY ARTERY OF NATIVE HEART WITHOUT ANGINA PECTORIS: ICD-10-CM

## 2019-02-04 DIAGNOSIS — I10 BENIGN ESSENTIAL HTN: Primary | ICD-10-CM

## 2019-02-04 DIAGNOSIS — I48.0 PAROXYSMAL ATRIAL FIBRILLATION (HCC): ICD-10-CM

## 2019-02-04 DIAGNOSIS — F33.42 MAJOR DEPRESSIVE DISORDER, RECURRENT, IN FULL REMISSION (HCC): ICD-10-CM

## 2019-02-04 DIAGNOSIS — I10 BENIGN ESSENTIAL HTN: ICD-10-CM

## 2019-02-04 LAB
BASOPHILS ABSOLUTE: 0 K/UL (ref 0–0.2)
BASOPHILS RELATIVE PERCENT: 0.7 %
EOSINOPHILS ABSOLUTE: 0 K/UL (ref 0–0.6)
EOSINOPHILS RELATIVE PERCENT: 0.7 %
HCT VFR BLD CALC: 23 % (ref 36–48)
HEMOGLOBIN: 7 G/DL (ref 12–16)
LYMPHOCYTES ABSOLUTE: 0.6 K/UL (ref 1–5.1)
LYMPHOCYTES RELATIVE PERCENT: 11.7 %
MCH RBC QN AUTO: 21.6 PG (ref 26–34)
MCHC RBC AUTO-ENTMCNC: 30.4 G/DL (ref 31–36)
MCV RBC AUTO: 71 FL (ref 80–100)
MONOCYTES ABSOLUTE: 0.5 K/UL (ref 0–1.3)
MONOCYTES RELATIVE PERCENT: 10.1 %
NEUTROPHILS ABSOLUTE: 4.1 K/UL (ref 1.7–7.7)
NEUTROPHILS RELATIVE PERCENT: 76.8 %
PDW BLD-RTO: 16 % (ref 12.4–15.4)
PLATELET # BLD: 362 K/UL (ref 135–450)
PMV BLD AUTO: 7.9 FL (ref 5–10.5)
RBC # BLD: 3.24 M/UL (ref 4–5.2)
WBC # BLD: 5.3 K/UL (ref 4–11)

## 2019-02-04 PROCEDURE — G8482 FLU IMMUNIZE ORDER/ADMIN: HCPCS | Performed by: INTERNAL MEDICINE

## 2019-02-04 PROCEDURE — 4040F PNEUMOC VAC/ADMIN/RCVD: CPT | Performed by: INTERNAL MEDICINE

## 2019-02-04 PROCEDURE — G8427 DOCREV CUR MEDS BY ELIG CLIN: HCPCS | Performed by: INTERNAL MEDICINE

## 2019-02-04 PROCEDURE — 1090F PRES/ABSN URINE INCON ASSESS: CPT | Performed by: INTERNAL MEDICINE

## 2019-02-04 PROCEDURE — 1036F TOBACCO NON-USER: CPT | Performed by: INTERNAL MEDICINE

## 2019-02-04 PROCEDURE — G8598 ASA/ANTIPLAT THER USED: HCPCS | Performed by: INTERNAL MEDICINE

## 2019-02-04 PROCEDURE — 1101F PT FALLS ASSESS-DOCD LE1/YR: CPT | Performed by: INTERNAL MEDICINE

## 2019-02-04 PROCEDURE — 99213 OFFICE O/P EST LOW 20 MIN: CPT | Performed by: INTERNAL MEDICINE

## 2019-02-04 PROCEDURE — G8417 CALC BMI ABV UP PARAM F/U: HCPCS | Performed by: INTERNAL MEDICINE

## 2019-02-04 PROCEDURE — 1123F ACP DISCUSS/DSCN MKR DOCD: CPT | Performed by: INTERNAL MEDICINE

## 2019-02-04 ASSESSMENT — ENCOUNTER SYMPTOMS: SHORTNESS OF BREATH: 0

## 2019-02-05 DIAGNOSIS — D64.9 ANEMIA, UNSPECIFIED TYPE: Primary | ICD-10-CM

## 2019-02-05 LAB
A/G RATIO: 1.7 (ref 1.1–2.2)
ALBUMIN SERPL-MCNC: 4 G/DL (ref 3.4–5)
ALP BLD-CCNC: 58 U/L (ref 40–129)
ALT SERPL-CCNC: 7 U/L (ref 10–40)
ANION GAP SERPL CALCULATED.3IONS-SCNC: 14 MMOL/L (ref 3–16)
AST SERPL-CCNC: 13 U/L (ref 15–37)
BILIRUB SERPL-MCNC: <0.2 MG/DL (ref 0–1)
BUN BLDV-MCNC: 14 MG/DL (ref 7–20)
CALCIUM SERPL-MCNC: 9 MG/DL (ref 8.3–10.6)
CHLORIDE BLD-SCNC: 102 MMOL/L (ref 99–110)
CHOLESTEROL, TOTAL: 149 MG/DL (ref 0–199)
CO2: 25 MMOL/L (ref 21–32)
CREAT SERPL-MCNC: 0.9 MG/DL (ref 0.6–1.2)
GFR AFRICAN AMERICAN: >60
GFR NON-AFRICAN AMERICAN: 58
GLOBULIN: 2.4 G/DL
GLUCOSE BLD-MCNC: 96 MG/DL (ref 70–99)
HDLC SERPL-MCNC: 75 MG/DL (ref 40–60)
LDL CHOLESTEROL CALCULATED: 57 MG/DL
POTASSIUM SERPL-SCNC: 4.4 MMOL/L (ref 3.5–5.1)
SODIUM BLD-SCNC: 141 MMOL/L (ref 136–145)
TOTAL PROTEIN: 6.4 G/DL (ref 6.4–8.2)
TRIGL SERPL-MCNC: 86 MG/DL (ref 0–150)
TSH REFLEX FT4: 2.54 UIU/ML (ref 0.27–4.2)
VLDLC SERPL CALC-MCNC: 17 MG/DL

## 2019-02-12 ENCOUNTER — OFFICE VISIT (OUTPATIENT)
Dept: INTERNAL MEDICINE CLINIC | Age: 84
End: 2019-02-12
Payer: MEDICARE

## 2019-02-12 VITALS
SYSTOLIC BLOOD PRESSURE: 150 MMHG | DIASTOLIC BLOOD PRESSURE: 68 MMHG | BODY MASS INDEX: 25.89 KG/M2 | HEART RATE: 64 BPM | HEIGHT: 57 IN | WEIGHT: 120 LBS

## 2019-02-12 DIAGNOSIS — I10 BENIGN ESSENTIAL HTN: ICD-10-CM

## 2019-02-12 DIAGNOSIS — D64.9 ANEMIA, UNSPECIFIED TYPE: Primary | ICD-10-CM

## 2019-02-12 DIAGNOSIS — I48.0 PAROXYSMAL ATRIAL FIBRILLATION (HCC): ICD-10-CM

## 2019-02-12 PROCEDURE — 99214 OFFICE O/P EST MOD 30 MIN: CPT | Performed by: INTERNAL MEDICINE

## 2019-02-12 PROCEDURE — G8427 DOCREV CUR MEDS BY ELIG CLIN: HCPCS | Performed by: INTERNAL MEDICINE

## 2019-02-12 PROCEDURE — G8598 ASA/ANTIPLAT THER USED: HCPCS | Performed by: INTERNAL MEDICINE

## 2019-02-12 PROCEDURE — 1101F PT FALLS ASSESS-DOCD LE1/YR: CPT | Performed by: INTERNAL MEDICINE

## 2019-02-12 PROCEDURE — 1123F ACP DISCUSS/DSCN MKR DOCD: CPT | Performed by: INTERNAL MEDICINE

## 2019-02-12 PROCEDURE — 1090F PRES/ABSN URINE INCON ASSESS: CPT | Performed by: INTERNAL MEDICINE

## 2019-02-12 PROCEDURE — G8482 FLU IMMUNIZE ORDER/ADMIN: HCPCS | Performed by: INTERNAL MEDICINE

## 2019-02-12 PROCEDURE — 1036F TOBACCO NON-USER: CPT | Performed by: INTERNAL MEDICINE

## 2019-02-12 PROCEDURE — G8417 CALC BMI ABV UP PARAM F/U: HCPCS | Performed by: INTERNAL MEDICINE

## 2019-02-12 PROCEDURE — 4040F PNEUMOC VAC/ADMIN/RCVD: CPT | Performed by: INTERNAL MEDICINE

## 2019-02-12 ASSESSMENT — ENCOUNTER SYMPTOMS
RECTAL PAIN: 0
BLOOD IN STOOL: 0
ANAL BLEEDING: 0
ABDOMINAL PAIN: 0
SHORTNESS OF BREATH: 0
CHEST TIGHTNESS: 0

## 2019-03-07 ENCOUNTER — TELEPHONE (OUTPATIENT)
Dept: INTERNAL MEDICINE CLINIC | Age: 84
End: 2019-03-07

## 2019-03-08 ENCOUNTER — OFFICE VISIT (OUTPATIENT)
Dept: INTERNAL MEDICINE CLINIC | Age: 84
End: 2019-03-08
Payer: MEDICARE

## 2019-03-08 VITALS
HEIGHT: 57 IN | WEIGHT: 118 LBS | SYSTOLIC BLOOD PRESSURE: 148 MMHG | DIASTOLIC BLOOD PRESSURE: 72 MMHG | BODY MASS INDEX: 25.46 KG/M2 | HEART RATE: 64 BPM

## 2019-03-08 DIAGNOSIS — R41.3 MEMORY DIFFICULTIES: ICD-10-CM

## 2019-03-08 DIAGNOSIS — M65.332 TRIGGER MIDDLE FINGER OF LEFT HAND: ICD-10-CM

## 2019-03-08 DIAGNOSIS — I10 BENIGN ESSENTIAL HTN: ICD-10-CM

## 2019-03-08 DIAGNOSIS — I48.0 PAROXYSMAL ATRIAL FIBRILLATION (HCC): ICD-10-CM

## 2019-03-08 DIAGNOSIS — D64.9 ANEMIA, UNSPECIFIED TYPE: ICD-10-CM

## 2019-03-08 DIAGNOSIS — Z00.00 PREVENTATIVE HEALTH CARE: Primary | ICD-10-CM

## 2019-03-08 DIAGNOSIS — I25.10 CORONARY ARTERY DISEASE INVOLVING NATIVE CORONARY ARTERY OF NATIVE HEART WITHOUT ANGINA PECTORIS: ICD-10-CM

## 2019-03-08 DIAGNOSIS — Z00.00 ROUTINE GENERAL MEDICAL EXAMINATION AT A HEALTH CARE FACILITY: ICD-10-CM

## 2019-03-08 LAB
ANISOCYTOSIS: ABNORMAL
BASOPHILS ABSOLUTE: 0.1 K/UL (ref 0–0.2)
BASOPHILS RELATIVE PERCENT: 1 %
EOSINOPHILS ABSOLUTE: 0 K/UL (ref 0–0.6)
EOSINOPHILS RELATIVE PERCENT: 0.7 %
FERRITIN: 44.4 NG/ML (ref 15–150)
FOLATE: 12.74 NG/ML (ref 4.78–24.2)
HCT VFR BLD CALC: 37 % (ref 36–48)
HEMOGLOBIN: 11.5 G/DL (ref 12–16)
IRON SATURATION: 34 % (ref 15–50)
IRON: 95 UG/DL (ref 37–145)
LYMPHOCYTES ABSOLUTE: 0.8 K/UL (ref 1–5.1)
LYMPHOCYTES RELATIVE PERCENT: 14 %
MCH RBC QN AUTO: 26.4 PG (ref 26–34)
MCHC RBC AUTO-ENTMCNC: 31.1 G/DL (ref 31–36)
MCV RBC AUTO: 84.9 FL (ref 80–100)
MONOCYTES ABSOLUTE: 0.4 K/UL (ref 0–1.3)
MONOCYTES RELATIVE PERCENT: 7 %
NEUTROPHILS ABSOLUTE: 4.2 K/UL (ref 1.7–7.7)
NEUTROPHILS RELATIVE PERCENT: 77.3 %
OVALOCYTES: ABNORMAL
PDW BLD-RTO: 25.7 % (ref 12.4–15.4)
PLATELET # BLD: 281 K/UL (ref 135–450)
PMV BLD AUTO: 8 FL (ref 5–10.5)
RBC # BLD: 4.36 M/UL (ref 4–5.2)
SCHISTOCYTES: ABNORMAL
TOTAL IRON BINDING CAPACITY: 283 UG/DL (ref 260–445)
VITAMIN B-12: 367 PG/ML (ref 211–911)
WBC # BLD: 5.5 K/UL (ref 4–11)

## 2019-03-08 PROCEDURE — 1123F ACP DISCUSS/DSCN MKR DOCD: CPT | Performed by: INTERNAL MEDICINE

## 2019-03-08 PROCEDURE — G0439 PPPS, SUBSEQ VISIT: HCPCS | Performed by: INTERNAL MEDICINE

## 2019-03-08 PROCEDURE — 4040F PNEUMOC VAC/ADMIN/RCVD: CPT | Performed by: INTERNAL MEDICINE

## 2019-03-08 PROCEDURE — 1090F PRES/ABSN URINE INCON ASSESS: CPT | Performed by: INTERNAL MEDICINE

## 2019-03-08 PROCEDURE — 1101F PT FALLS ASSESS-DOCD LE1/YR: CPT | Performed by: INTERNAL MEDICINE

## 2019-03-08 PROCEDURE — 99214 OFFICE O/P EST MOD 30 MIN: CPT | Performed by: INTERNAL MEDICINE

## 2019-03-08 PROCEDURE — G8598 ASA/ANTIPLAT THER USED: HCPCS | Performed by: INTERNAL MEDICINE

## 2019-03-08 PROCEDURE — G8427 DOCREV CUR MEDS BY ELIG CLIN: HCPCS | Performed by: INTERNAL MEDICINE

## 2019-03-08 PROCEDURE — G8417 CALC BMI ABV UP PARAM F/U: HCPCS | Performed by: INTERNAL MEDICINE

## 2019-03-08 PROCEDURE — 1036F TOBACCO NON-USER: CPT | Performed by: INTERNAL MEDICINE

## 2019-03-08 PROCEDURE — G8482 FLU IMMUNIZE ORDER/ADMIN: HCPCS | Performed by: INTERNAL MEDICINE

## 2019-03-08 ASSESSMENT — ENCOUNTER SYMPTOMS
GASTROINTESTINAL NEGATIVE: 1
BLOOD IN STOOL: 0
TROUBLE SWALLOWING: 0
SHORTNESS OF BREATH: 0

## 2019-03-08 ASSESSMENT — PATIENT HEALTH QUESTIONNAIRE - PHQ9
1. LITTLE INTEREST OR PLEASURE IN DOING THINGS: 0
2. FEELING DOWN, DEPRESSED OR HOPELESS: 0
SUM OF ALL RESPONSES TO PHQ QUESTIONS 1-9: 0
SUM OF ALL RESPONSES TO PHQ QUESTIONS 1-9: 0
SUM OF ALL RESPONSES TO PHQ9 QUESTIONS 1 & 2: 0

## 2019-03-08 ASSESSMENT — LIFESTYLE VARIABLES: HOW OFTEN DO YOU HAVE A DRINK CONTAINING ALCOHOL: 0

## 2019-03-11 ENCOUNTER — OFFICE VISIT (OUTPATIENT)
Dept: CARDIOLOGY CLINIC | Age: 84
End: 2019-03-11
Payer: MEDICARE

## 2019-03-11 VITALS
WEIGHT: 118 LBS | OXYGEN SATURATION: 93 % | DIASTOLIC BLOOD PRESSURE: 50 MMHG | SYSTOLIC BLOOD PRESSURE: 110 MMHG | HEART RATE: 57 BPM | HEIGHT: 60 IN | BODY MASS INDEX: 23.16 KG/M2

## 2019-03-11 DIAGNOSIS — I34.0 NON-RHEUMATIC MITRAL REGURGITATION: ICD-10-CM

## 2019-03-11 DIAGNOSIS — I35.1 NONRHEUMATIC AORTIC VALVE INSUFFICIENCY: ICD-10-CM

## 2019-03-11 DIAGNOSIS — I25.10 CAD IN NATIVE ARTERY: ICD-10-CM

## 2019-03-11 DIAGNOSIS — I47.1 SVT (SUPRAVENTRICULAR TACHYCARDIA) (HCC): ICD-10-CM

## 2019-03-11 DIAGNOSIS — I10 ESSENTIAL HYPERTENSION: ICD-10-CM

## 2019-03-11 DIAGNOSIS — I48.0 PAROXYSMAL ATRIAL FIBRILLATION (HCC): Primary | ICD-10-CM

## 2019-03-11 PROCEDURE — 1101F PT FALLS ASSESS-DOCD LE1/YR: CPT | Performed by: INTERNAL MEDICINE

## 2019-03-11 PROCEDURE — G8482 FLU IMMUNIZE ORDER/ADMIN: HCPCS | Performed by: INTERNAL MEDICINE

## 2019-03-11 PROCEDURE — G8598 ASA/ANTIPLAT THER USED: HCPCS | Performed by: INTERNAL MEDICINE

## 2019-03-11 PROCEDURE — G8427 DOCREV CUR MEDS BY ELIG CLIN: HCPCS | Performed by: INTERNAL MEDICINE

## 2019-03-11 PROCEDURE — 99214 OFFICE O/P EST MOD 30 MIN: CPT | Performed by: INTERNAL MEDICINE

## 2019-03-11 PROCEDURE — 1036F TOBACCO NON-USER: CPT | Performed by: INTERNAL MEDICINE

## 2019-03-11 PROCEDURE — 1090F PRES/ABSN URINE INCON ASSESS: CPT | Performed by: INTERNAL MEDICINE

## 2019-03-11 PROCEDURE — G8420 CALC BMI NORM PARAMETERS: HCPCS | Performed by: INTERNAL MEDICINE

## 2019-03-11 PROCEDURE — 4040F PNEUMOC VAC/ADMIN/RCVD: CPT | Performed by: INTERNAL MEDICINE

## 2019-03-11 PROCEDURE — 1123F ACP DISCUSS/DSCN MKR DOCD: CPT | Performed by: INTERNAL MEDICINE

## 2019-03-17 ENCOUNTER — HOSPITAL ENCOUNTER (EMERGENCY)
Age: 84
Discharge: HOME | DRG: 189 | End: 2019-03-17
Attending: EMERGENCY MEDICINE
Payer: MEDICARE

## 2019-03-17 VITALS
RESPIRATION RATE: 18 BRPM | OXYGEN SATURATION: 95 % | TEMPERATURE: 98.1 F | HEIGHT: 66 IN | SYSTOLIC BLOOD PRESSURE: 160 MMHG | WEIGHT: 118 LBS | BODY MASS INDEX: 18.96 KG/M2 | HEART RATE: 65 BPM | DIASTOLIC BLOOD PRESSURE: 65 MMHG

## 2019-03-17 DIAGNOSIS — R53.1 GENERAL WEAKNESS: Primary | ICD-10-CM

## 2019-03-17 LAB
ANION GAP SERPL CALCULATED.3IONS-SCNC: 17 MMOL/L (ref 3–16)
ANISOCYTOSIS: ABNORMAL
BASOPHILS ABSOLUTE: 0 K/UL (ref 0–0.2)
BASOPHILS RELATIVE PERCENT: 0.7 %
BUN BLDV-MCNC: 12 MG/DL (ref 7–20)
CALCIUM SERPL-MCNC: 9.3 MG/DL (ref 8.3–10.6)
CHLORIDE BLD-SCNC: 102 MMOL/L (ref 99–110)
CO2: 25 MMOL/L (ref 21–32)
CREAT SERPL-MCNC: 0.9 MG/DL (ref 0.6–1.2)
EOSINOPHILS ABSOLUTE: 0.1 K/UL (ref 0–0.6)
EOSINOPHILS RELATIVE PERCENT: 1 %
GFR AFRICAN AMERICAN: >60
GFR NON-AFRICAN AMERICAN: 58
GLUCOSE BLD-MCNC: 90 MG/DL (ref 70–99)
HCT VFR BLD CALC: 33.8 % (ref 36–48)
HEMOGLOBIN: 10.9 G/DL (ref 12–16)
LYMPHOCYTES ABSOLUTE: 0.5 K/UL (ref 1–5.1)
LYMPHOCYTES RELATIVE PERCENT: 10 %
MCH RBC QN AUTO: 27 PG (ref 26–34)
MCHC RBC AUTO-ENTMCNC: 32.2 G/DL (ref 31–36)
MCV RBC AUTO: 83.9 FL (ref 80–100)
MONOCYTES ABSOLUTE: 0.4 K/UL (ref 0–1.3)
MONOCYTES RELATIVE PERCENT: 6.9 %
NEUTROPHILS ABSOLUTE: 4.1 K/UL (ref 1.7–7.7)
NEUTROPHILS RELATIVE PERCENT: 81.4 %
PDW BLD-RTO: 23.3 % (ref 12.4–15.4)
PLATELET # BLD: 249 K/UL (ref 135–450)
PMV BLD AUTO: 8 FL (ref 5–10.5)
POTASSIUM REFLEX MAGNESIUM: 3.6 MMOL/L (ref 3.5–5.1)
RBC # BLD: 4.03 M/UL (ref 4–5.2)
SCHISTOCYTES: ABNORMAL
SODIUM BLD-SCNC: 144 MMOL/L (ref 136–145)
WBC # BLD: 5.1 K/UL (ref 4–11)

## 2019-03-17 PROCEDURE — 2580000003 HC RX 258: Performed by: PHYSICIAN ASSISTANT

## 2019-03-17 PROCEDURE — 85025 COMPLETE CBC W/AUTO DIFF WBC: CPT

## 2019-03-17 PROCEDURE — 96360 HYDRATION IV INFUSION INIT: CPT

## 2019-03-17 PROCEDURE — 99285 EMERGENCY DEPT VISIT HI MDM: CPT

## 2019-03-17 PROCEDURE — 93005 ELECTROCARDIOGRAM TRACING: CPT | Performed by: EMERGENCY MEDICINE

## 2019-03-17 PROCEDURE — 80048 BASIC METABOLIC PNL TOTAL CA: CPT

## 2019-03-17 RX ORDER — 0.9 % SODIUM CHLORIDE 0.9 %
500 INTRAVENOUS SOLUTION INTRAVENOUS ONCE
Status: COMPLETED | OUTPATIENT
Start: 2019-03-17 | End: 2019-03-17

## 2019-03-17 RX ADMIN — SODIUM CHLORIDE 500 ML: 9 INJECTION, SOLUTION INTRAVENOUS at 19:00

## 2019-03-17 ASSESSMENT — ENCOUNTER SYMPTOMS
DIARRHEA: 0
NAUSEA: 0
WHEEZING: 0
COUGH: 0
CHEST TIGHTNESS: 0
SHORTNESS OF BREATH: 0
ABDOMINAL PAIN: 0
VOMITING: 0

## 2019-03-17 NOTE — ED PROVIDER NOTES
[address] [facility] 71 ENCOUNTER          PHYSICIAN ASSISTANT NOTE       Date of evaluation: 3/17/2019    Chief Complaint     Fatigue    History of Present Illness     Keith Lock is a 80 y.o. female who presents with chief complaint of fatigue. Patient reports that she is in her room at her nursing facility when she was standing up reading the paper. Reports that her legs began to feel weak and shaky. She was able to grab her walker and walked to the door of her room to ask for assistance from one of the staff. They called EMS and patient was transferred to this emergency department. Patient denies any headache, dizziness, lightheadedness, chest pain, shortness of breath, or any other concerns during this episode of weakness. She reports that her legs has since stopped shaking and she feels back to her baseline. States she probably has not been drinking as much water as she should. Denies any fevers or chills. Denies any other concerns at this time. Review of Systems     Review of Systems   Constitutional: Negative for chills, diaphoresis, fatigue and fever. Respiratory: Negative for cough, chest tightness, shortness of breath and wheezing. Cardiovascular: Negative for chest pain and palpitations. Gastrointestinal: Negative for abdominal pain, diarrhea, nausea and vomiting. Genitourinary: Negative. Musculoskeletal: Negative. Skin: Negative for rash. Neurological: Positive for weakness (leg weakness). Negative for dizziness, light-headedness and headaches. All other systems reviewed and are negative. Past Medical, Surgical, Family, and Social History     She has a past medical history of CAD (coronary artery disease), Hiatal hernia, Hyperlipidemia, Hypertension, and Macular degeneration of both eyes. She has a past surgical history that includes Diagnostic Cardiac Cath Lab Procedure; Cholecystectomy; Appendectomy;  Tonsillectomy and adenoidectomy; joint replacement; bladder repair (Bladder tuck); bladder suspension (2008); cardiovascular stress test (8/12); and doppler echocardiography (10/13). Her family history includes Heart Attack in her father and mother; Heart Disease in her father and mother; Other in her daughter. She reports that she has quit smoking. She has never used smokeless tobacco. She reports that she does not drink alcohol or use drugs. Medications     Previous Medications    ACETAMINOPHEN (TYLENOL) 325 MG TABLET    Take 650 mg by mouth nightly    ASPIRIN 81 MG EC TABLET    Take 81 mg by mouth daily. BENAZEPRIL (LOTENSIN) 10 MG TABLET    Take 0.5 tablets by mouth daily    CALCIUM CARBONATE 600 MG TABS TABLET    Take 1 tablet by mouth daily    DONEPEZIL (ARICEPT) 10 MG TABLET    TAKE 1 TABLET BY MOUTH NIGHTLY    ELIQUIS 2.5 MG TABS TABLET    TAKE 1 TABLET BY MOUTH TWICE DAILY    FAMOTIDINE (PEPCID) 20 MG TABLET    Take 1 tablet by mouth daily    FERROUS SULFATE-C-FOLIC ACID (FERROUS SULFATE-C-FA ER PO)    Take by mouth daily    METOPROLOL TARTRATE (LOPRESSOR) 25 MG TABLET    TAKE 1 TABLET BY MOUTH 2 TIMES DAILY. THIS REPLACES THE ATENOLOL. NITROGLYCERIN (NITROSTAT) 0.4 MG SL TABLET    up to max of 3 total doses. If no relief after 1 dose, call 911. SIMVASTATIN (ZOCOR) 20 MG TABLET    1 TABLET BY MOUTH AT BEDTIME * NO GRAPEFRUIT *       Allergies     She is allergic to pcn [penicillins] and sulfa antibiotics. Physical Exam     INITIAL VITALS: BP: (!) 167/82, Temp: 98.1 °F (36.7 °C), Pulse: 62, Resp: 20, SpO2: 96 %  Physical Exam   Constitutional: She is oriented to person, place, and time. She appears well-developed and well-nourished. No distress. Elderly female   HENT:   Head: Normocephalic and atraumatic. Eyes: Pupils are equal, round, and reactive to light. Conjunctivae and EOM are normal.   Neck: Normal range of motion. Cardiovascular: Normal rate and regular rhythm. Exam reveals no friction rub.    Murmur (systolic ejection murmur) heard. Pulmonary/Chest: Effort normal and breath sounds normal. No respiratory distress. She has no wheezes. She has no rales. Abdominal: Soft. She exhibits no distension. There is no tenderness. There is no guarding. Musculoskeletal: Normal range of motion. Neurological: She is alert and oriented to person, place, and time. Skin: Skin is warm and dry. She is not diaphoretic. Psychiatric: She has a normal mood and affect. Her behavior is normal. Judgment and thought content normal.   Nursing note and vitals reviewed.     Diagnostic Results     EKG   Interpreted in conjunction with emergency department physician No att. providers found  Rhythm: sinus bradycardia  Rate: 54  Axis: normal  Ectopy: none  Conduction: short DE - 104ms  ST Segments: no acute change  T Waves: no acute change  Q Waves:nonspecific  Clinical Impression: sinus bradycardia  Comparison:  Prior EKGs have been atrial fibrillation and sinus rhythm with rates around 65 - she is beta blocked    RADIOLOGY:  No orders to display       LABS:   Results for orders placed or performed during the hospital encounter of 03/17/19   CBC Auto Differential   Result Value Ref Range    WBC 5.1 4.0 - 11.0 K/uL    RBC 4.03 4.00 - 5.20 M/uL    Hemoglobin 10.9 (L) 12.0 - 16.0 g/dL    Hematocrit 33.8 (L) 36.0 - 48.0 %    MCV 83.9 80.0 - 100.0 fL    MCH 27.0 26.0 - 34.0 pg    MCHC 32.2 31.0 - 36.0 g/dL    RDW 23.3 (H) 12.4 - 15.4 %    Platelets 833 883 - 808 K/uL    MPV 8.0 5.0 - 10.5 fL    Neutrophils % 81.4 %    Lymphocytes % 10.0 %    Monocytes % 6.9 %    Eosinophils % 1.0 %    Basophils % 0.7 %    Neutrophils # 4.1 1.7 - 7.7 K/uL    Lymphocytes # 0.5 (L) 1.0 - 5.1 K/uL    Monocytes # 0.4 0.0 - 1.3 K/uL    Eosinophils # 0.1 0.0 - 0.6 K/uL    Basophils # 0.0 0.0 - 0.2 K/uL    Anisocytosis Occasional (A)     Schistocytes Occasional (A)    Basic Metabolic Panel w/ Reflex to MG   Result Value Ref Range    Sodium 144 136 - 145 mmol/L    Potassium Ms. Geovany Vegas first thing in the morning. I also discussed that it may be time to up her level of care to at least assisted living for more time with nursing staff. Pat Sagarjennie reports she will relay this to the nursing staff. Return precautions emergency department were discussed. This patient was also evaluated by the attending physician. All care plans were discussed and agreed upon. Clinical Impression     1.  General weakness        Disposition     PATIENT REFERRED TO:  Torsten Read MD  [address] [facility] [address] [place] [address]  [phone]    Schedule an appointment as soon as possible for a visit         DISCHARGE MEDICATIONS:  New Prescriptions    No medications on file       [address]  03/17/19 5235

## 2019-03-17 NOTE — ED NOTES
Bed: A10-10  Expected date:   Expected time:   Means of arrival:   Comments:  Julio Hernandez 89, [facility]  03/17/19 5852

## 2019-03-17 NOTE — ED TRIAGE NOTES
Pt is a resident at [facility] and pt states she was standing at the counter reading the paper and pt states knees started to give out but did not fall.  Pt was able to open the door to her residents and sat down

## 2019-03-18 LAB
EKG ATRIAL RATE: 54 BPM
EKG DIAGNOSIS: NORMAL
EKG P-R INTERVAL: 104 MS
EKG Q-T INTERVAL: 446 MS
EKG QRS DURATION: 72 MS
EKG QTC CALCULATION (BAZETT): 422 MS
EKG R AXIS: 35 DEGREES
EKG T AXIS: 77 DEGREES
EKG VENTRICULAR RATE: 54 BPM

## 2019-03-18 NOTE — ED NOTES
Pt ambulated to bathroom attached to room. Able to ambulate X 2 assist. Usually uses walker at [address] [facility].       Ranjan Barahona RN  03/17/19 2034

## 2019-03-18 NOTE — ED NOTES
Pt discharged to nursing home, alert and oriented. Denies any questions about discharge instructions. Will follow up as directed. encouraged to return for any worsening symptoms.         Beatriz Early RN  03/17/19 0242

## 2019-03-19 ENCOUNTER — APPOINTMENT (OUTPATIENT)
Dept: CT IMAGING | Age: 84
DRG: 189 | End: 2019-03-19
Payer: MEDICARE

## 2019-03-19 ENCOUNTER — APPOINTMENT (OUTPATIENT)
Dept: GENERAL RADIOLOGY | Age: 84
DRG: 189 | End: 2019-03-19
Payer: MEDICARE

## 2019-03-19 ENCOUNTER — HOSPITAL ENCOUNTER (INPATIENT)
Age: 84
LOS: 2 days | Discharge: HOME | DRG: 189 | End: 2019-03-21
Attending: EMERGENCY MEDICINE | Admitting: HOSPITALIST
Payer: MEDICARE

## 2019-03-19 DIAGNOSIS — R09.02 HYPOXIA: ICD-10-CM

## 2019-03-19 DIAGNOSIS — J18.9 PNEUMONIA DUE TO ORGANISM: Primary | ICD-10-CM

## 2019-03-19 PROBLEM — I16.0 HYPERTENSIVE URGENCY: Status: ACTIVE | Noted: 2019-03-19
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
## 2019-03-19 PROBLEM — Z79.01 ANTICOAGULATED: Chronic | Status: ACTIVE | Noted: 2019-03-19

## 2019-03-19 PROBLEM — D64.9 ANEMIA: Chronic | Status: ACTIVE | Noted: 2018-09-17

## 2019-03-19 PROBLEM — J96.01 ACUTE RESPIRATORY FAILURE WITH HYPOXIA (HCC): Status: ACTIVE | Noted: 2019-03-19

## 2019-03-19 PROBLEM — I34.0 NONRHEUMATIC MITRAL VALVE REGURGITATION: Chronic | Status: ACTIVE | Noted: 2019-03-19
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
## 2019-03-19 PROBLEM — R01.1 HEART MURMUR: Chronic | Status: ACTIVE | Noted: 2018-07-27

## 2019-03-19 PROBLEM — I51.89 DIASTOLIC DYSFUNCTION: Chronic | Status: ACTIVE | Noted: 2018-07-27

## 2019-03-19 PROBLEM — I35.1 NONRHEUMATIC AORTIC VALVE INSUFFICIENCY: Chronic | Status: ACTIVE | Noted: 2018-03-06

## 2019-03-19 LAB
ANION GAP SERPL CALCULATED.3IONS-SCNC: 10 MMOL/L (ref 3–16)
BASE EXCESS VENOUS: 1 (ref -3–3)
BASOPHILS ABSOLUTE: 0 K/UL (ref 0–0.2)
BASOPHILS RELATIVE PERCENT: 0.6 %
BUN BLDV-MCNC: 12 MG/DL (ref 7–20)
CALCIUM SERPL-MCNC: 9 MG/DL (ref 8.3–10.6)
CHLORIDE BLD-SCNC: 102 MMOL/L (ref 99–110)
CO2: 28 MMOL/L (ref 21–32)
CREAT SERPL-MCNC: 0.8 MG/DL (ref 0.6–1.2)
EKG ATRIAL RATE: 55 BPM
EKG DIAGNOSIS: NORMAL
EKG P AXIS: 93 DEGREES
EKG P-R INTERVAL: 142 MS
EKG Q-T INTERVAL: 440 MS
EKG QRS DURATION: 76 MS
EKG QTC CALCULATION (BAZETT): 420 MS
EKG R AXIS: 46 DEGREES
EKG T AXIS: 59 DEGREES
EKG VENTRICULAR RATE: 55 BPM
EOSINOPHILS ABSOLUTE: 0 K/UL (ref 0–0.6)
EOSINOPHILS RELATIVE PERCENT: 0.1 %
FOLATE: 12.7 NG/ML (ref 4.78–24.2)
GFR AFRICAN AMERICAN: >60
GFR NON-AFRICAN AMERICAN: >60
GLUCOSE BLD-MCNC: 130 MG/DL (ref 70–99)
HCO3 VENOUS: 25.4 MMOL/L (ref 23–29)
HCT VFR BLD CALC: 34.4 % (ref 36–48)
HEMOGLOBIN: 11.2 G/DL (ref 12–16)
LYMPHOCYTES ABSOLUTE: 0.2 K/UL (ref 1–5.1)
LYMPHOCYTES RELATIVE PERCENT: 2.8 %
MCH RBC QN AUTO: 27.2 PG (ref 26–34)
MCHC RBC AUTO-ENTMCNC: 32.4 G/DL (ref 31–36)
MCV RBC AUTO: 83.9 FL (ref 80–100)
MONOCYTES ABSOLUTE: 0.6 K/UL (ref 0–1.3)
MONOCYTES RELATIVE PERCENT: 8.5 %
NEUTROPHILS ABSOLUTE: 5.8 K/UL (ref 1.7–7.7)
NEUTROPHILS RELATIVE PERCENT: 88 %
O2 SAT, VEN: 68 %
PCO2, VEN: 38.7 MM HG (ref 40–50)
PDW BLD-RTO: 22.7 % (ref 12.4–15.4)
PERFORMED ON: ABNORMAL
PH VENOUS: 7.43 (ref 7.35–7.45)
PLATELET # BLD: 235 K/UL (ref 135–450)
PMV BLD AUTO: 8 FL (ref 5–10.5)
PO2, VEN: 34 MM HG
POC SAMPLE TYPE: ABNORMAL
POTASSIUM SERPL-SCNC: 4 MMOL/L (ref 3.5–5.1)
PRO-BNP: 2604 PG/ML (ref 0–449)
PROCALCITONIN: 0.3 NG/ML (ref 0–0.15)
RAPID INFLUENZA  B AGN: NEGATIVE
RAPID INFLUENZA A AGN: NEGATIVE
RBC # BLD: 4.1 M/UL (ref 4–5.2)
SODIUM BLD-SCNC: 140 MMOL/L (ref 136–145)
TCO2 CALC VENOUS: 27 MMOL/L
TROPONIN: <0.01 NG/ML
VITAMIN B-12: 444 PG/ML (ref 211–911)
WBC # BLD: 6.6 K/UL (ref 4–11)

## 2019-03-19 PROCEDURE — 73560 X-RAY EXAM OF KNEE 1 OR 2: CPT

## 2019-03-19 PROCEDURE — 6370000000 HC RX 637 (ALT 250 FOR IP): Performed by: STUDENT IN AN ORGANIZED HEALTH CARE EDUCATION/TRAINING PROGRAM

## 2019-03-19 PROCEDURE — 6360000002 HC RX W HCPCS: Performed by: EMERGENCY MEDICINE

## 2019-03-19 PROCEDURE — 2580000003 HC RX 258: Performed by: STUDENT IN AN ORGANIZED HEALTH CARE EDUCATION/TRAINING PROGRAM

## 2019-03-19 PROCEDURE — 2580000003 HC RX 258: Performed by: EMERGENCY MEDICINE

## 2019-03-19 PROCEDURE — 93005 ELECTROCARDIOGRAM TRACING: CPT | Performed by: STUDENT IN AN ORGANIZED HEALTH CARE EDUCATION/TRAINING PROGRAM

## 2019-03-19 PROCEDURE — 87804 INFLUENZA ASSAY W/OPTIC: CPT

## 2019-03-19 PROCEDURE — 80048 BASIC METABOLIC PNL TOTAL CA: CPT

## 2019-03-19 PROCEDURE — 1200000000 HC SEMI PRIVATE

## 2019-03-19 PROCEDURE — 94664 DEMO&/EVAL PT USE INHALER: CPT

## 2019-03-19 PROCEDURE — 36415 COLL VENOUS BLD VENIPUNCTURE: CPT

## 2019-03-19 PROCEDURE — 82607 VITAMIN B-12: CPT

## 2019-03-19 PROCEDURE — 99222 1ST HOSP IP/OBS MODERATE 55: CPT | Performed by: HOSPITALIST

## 2019-03-19 PROCEDURE — 92610 EVALUATE SWALLOWING FUNCTION: CPT

## 2019-03-19 PROCEDURE — 84484 ASSAY OF TROPONIN QUANT: CPT

## 2019-03-19 PROCEDURE — 82746 ASSAY OF FOLIC ACID SERUM: CPT

## 2019-03-19 PROCEDURE — 94760 N-INVAS EAR/PLS OXIMETRY 1: CPT

## 2019-03-19 PROCEDURE — 71045 X-RAY EXAM CHEST 1 VIEW: CPT

## 2019-03-19 PROCEDURE — 85025 COMPLETE CBC W/AUTO DIFF WBC: CPT

## 2019-03-19 PROCEDURE — 99285 EMERGENCY DEPT VISIT HI MDM: CPT

## 2019-03-19 PROCEDURE — 83880 ASSAY OF NATRIURETIC PEPTIDE: CPT

## 2019-03-19 PROCEDURE — 70450 CT HEAD/BRAIN W/O DYE: CPT

## 2019-03-19 PROCEDURE — 84145 PROCALCITONIN (PCT): CPT

## 2019-03-19 PROCEDURE — 82803 BLOOD GASES ANY COMBINATION: CPT

## 2019-03-19 RX ORDER — ONDANSETRON 2 MG/ML
4 INJECTION INTRAMUSCULAR; INTRAVENOUS EVERY 6 HOURS PRN
Status: DISCONTINUED | OUTPATIENT
Start: 2019-03-19 | End: 2019-03-21 | Stop reason: HOSPADM

## 2019-03-19 RX ORDER — IPRATROPIUM BROMIDE AND ALBUTEROL SULFATE 2.5; .5 MG/3ML; MG/3ML
1 SOLUTION RESPIRATORY (INHALATION) EVERY 4 HOURS PRN
Status: DISCONTINUED | OUTPATIENT
Start: 2019-03-19 | End: 2019-03-21 | Stop reason: HOSPADM

## 2019-03-19 RX ORDER — CALCIUM CARBONATE 500(1250)
500 TABLET ORAL DAILY
Status: DISCONTINUED | OUTPATIENT
Start: 2019-03-19 | End: 2019-03-21 | Stop reason: HOSPADM

## 2019-03-19 RX ORDER — TRAMADOL HYDROCHLORIDE 50 MG/1
50 TABLET ORAL EVERY 6 HOURS PRN
COMMUNITY
End: 2019-08-30 | Stop reason: SDUPTHER

## 2019-03-19 RX ORDER — LISINOPRIL 10 MG/1
10 TABLET ORAL DAILY
Status: DISCONTINUED | OUTPATIENT
Start: 2019-03-19 | End: 2019-03-20

## 2019-03-19 RX ORDER — SODIUM CHLORIDE 0.9 % (FLUSH) 0.9 %
10 SYRINGE (ML) INJECTION EVERY 12 HOURS SCHEDULED
Status: DISCONTINUED | OUTPATIENT
Start: 2019-03-19 | End: 2019-03-21 | Stop reason: HOSPADM

## 2019-03-19 RX ORDER — FERROUS SULFATE 325(65) MG
325 TABLET ORAL 2 TIMES DAILY WITH MEALS
Status: DISCONTINUED | OUTPATIENT
Start: 2019-03-19 | End: 2019-03-19

## 2019-03-19 RX ORDER — FERROUS SULFATE 325(65) MG
325 TABLET ORAL
Status: DISCONTINUED | OUTPATIENT
Start: 2019-03-20 | End: 2019-03-21 | Stop reason: HOSPADM

## 2019-03-19 RX ORDER — SIMVASTATIN 20 MG
20 TABLET ORAL NIGHTLY
Status: DISCONTINUED | OUTPATIENT
Start: 2019-03-19 | End: 2019-03-21 | Stop reason: HOSPADM

## 2019-03-19 RX ORDER — FAMOTIDINE 20 MG/1
20 TABLET, FILM COATED ORAL DAILY
Status: DISCONTINUED | OUTPATIENT
Start: 2019-03-19 | End: 2019-03-21 | Stop reason: HOSPADM

## 2019-03-19 RX ORDER — IPRATROPIUM BROMIDE AND ALBUTEROL SULFATE 2.5; .5 MG/3ML; MG/3ML
1 SOLUTION RESPIRATORY (INHALATION) EVERY 4 HOURS
Status: DISCONTINUED | OUTPATIENT
Start: 2019-03-19 | End: 2019-03-19

## 2019-03-19 RX ORDER — FERROUS SULFATE 325(65) MG
325 TABLET ORAL 2 TIMES DAILY WITH MEALS
COMMUNITY
End: 2019-12-17

## 2019-03-19 RX ORDER — IPRATROPIUM BROMIDE AND ALBUTEROL SULFATE 2.5; .5 MG/3ML; MG/3ML
1 SOLUTION RESPIRATORY (INHALATION)
Status: DISCONTINUED | OUTPATIENT
Start: 2019-03-19 | End: 2019-03-19

## 2019-03-19 RX ORDER — FERROUS SULFATE 325(65) MG
325 TABLET ORAL
Status: DISCONTINUED | OUTPATIENT
Start: 2019-03-20 | End: 2019-03-19

## 2019-03-19 RX ORDER — TRAMADOL HYDROCHLORIDE 50 MG/1
50 TABLET ORAL EVERY 6 HOURS PRN
Status: DISCONTINUED | OUTPATIENT
Start: 2019-03-19 | End: 2019-03-21 | Stop reason: HOSPADM

## 2019-03-19 RX ORDER — SODIUM CHLORIDE 0.9 % (FLUSH) 0.9 %
10 SYRINGE (ML) INJECTION PRN
Status: DISCONTINUED | OUTPATIENT
Start: 2019-03-19 | End: 2019-03-21 | Stop reason: HOSPADM

## 2019-03-19 RX ORDER — ASPIRIN 81 MG/1
81 TABLET ORAL DAILY
Status: DISCONTINUED | OUTPATIENT
Start: 2019-03-19 | End: 2019-03-21 | Stop reason: HOSPADM

## 2019-03-19 RX ORDER — DONEPEZIL HYDROCHLORIDE 10 MG/1
10 TABLET, FILM COATED ORAL NIGHTLY
Status: DISCONTINUED | OUTPATIENT
Start: 2019-03-19 | End: 2019-03-21 | Stop reason: HOSPADM

## 2019-03-19 RX ADMIN — FAMOTIDINE 20 MG: 20 TABLET ORAL at 17:30

## 2019-03-19 RX ADMIN — METOPROLOL TARTRATE 25 MG: 25 TABLET ORAL at 23:28

## 2019-03-19 RX ADMIN — DONEPEZIL HYDROCHLORIDE 10 MG: 10 TABLET, FILM COATED ORAL at 20:33

## 2019-03-19 RX ADMIN — AZITHROMYCIN MONOHYDRATE 500 MG: 500 INJECTION, POWDER, LYOPHILIZED, FOR SOLUTION INTRAVENOUS at 13:09

## 2019-03-19 RX ADMIN — ASPIRIN 81 MG: 81 TABLET ORAL at 17:30

## 2019-03-19 RX ADMIN — CEFTRIAXONE 1 G: 1 INJECTION, POWDER, FOR SOLUTION INTRAMUSCULAR; INTRAVENOUS at 11:23

## 2019-03-19 RX ADMIN — APIXABAN 2.5 MG: 5 TABLET, FILM COATED ORAL at 20:33

## 2019-03-19 RX ADMIN — SIMVASTATIN 20 MG: 20 TABLET, FILM COATED ORAL at 20:33

## 2019-03-19 RX ADMIN — Medication 10 ML: at 20:33

## 2019-03-19 RX ADMIN — LISINOPRIL 10 MG: 10 TABLET ORAL at 17:31

## 2019-03-19 RX ADMIN — CALCIUM 500 MG: 500 TABLET ORAL at 17:30

## 2019-03-19 ASSESSMENT — PAIN SCALES - PAIN ASSESSMENT IN ADVANCED DEMENTIA (PAINAD)
FACIALEXPRESSION: 0
BREATHING: 0
CONSOLABILITY: 0
NEGVOCALIZATION: 0
BODYLANGUAGE: 0
NEGVOCALIZATION: 0
FACIALEXPRESSION: 0
FACIALEXPRESSION: 0
CONSOLABILITY: 0
BREATHING: 0
TOTALSCORE: 0
BODYLANGUAGE: 0
TOTALSCORE: 0
TOTALSCORE: 0
NEGVOCALIZATION: 0
BREATHING: 0
CONSOLABILITY: 0
BODYLANGUAGE: 0

## 2019-03-19 ASSESSMENT — ENCOUNTER SYMPTOMS
COLOR CHANGE: 0
SHORTNESS OF BREATH: 0

## 2019-03-19 NOTE — PROGRESS NOTES
4 Eyes Admission Assessment     I agree as the admission nurse that 2 RN's have performed a thorough Head to Toe Skin Assessment on the patient. ALL assessment sites listed below have been assessed on admission. Areas assessed by both nurses: ***  [x]   Head, Face, and Ears   [x]   Shoulders, Back, and Chest  [x]   Arms, Elbows, and Hands   [x]   Coccyx, Sacrum, and Ischum  [x]   Legs, Feet, and Heels        Does the Patient have Skin Breakdown? No   Pt has pinkness to bottom. Soft, pink bilateral heels elevated off of the bed. Redness to left knee and BLE.          Herman Prevention initiated:  Yes   Wound Care Orders initiated:  No      WOC nurse consulted for Pressure Injury (Stage 3,4, Unstageable, DTI, NWPT, and Complex wounds):  No      Nurse 1 eSignature: Electronically signed by Raysa Crane RN on 3/19/19 at 8:00 PM    **SHARE this note so that the co-signing nurse is able to place an eSignature**    Nurse 2 eSignature: {Esignature:[id number]}

## 2019-03-19 NOTE — ED NOTES
Bed: A06-06  Expected date: 3/19/19  Expected time:   Means of arrival:   Comments:  [facility] [address], [address] [facility]  03/19/19 6329

## 2019-03-19 NOTE — ED NOTES
Attempted to sit up in bed, pt states, \" I don't want to use a bedpan I want to go to the BR. \"  Pt unable to transfer safely with 1 person assist.  Offered pt bedpan again, explaining that she is not steady on her feet, pt states, \" I don't have to go to the BR. \"  Pt has an attend on and is dry at this time.      Jeramie Cason RN  03/19/19 5122

## 2019-03-19 NOTE — ED TRIAGE NOTES
Pt arrives to ED via EMS with c/o pain to left elbow and left knee s/p fall on Curt 3/17/19 while trying to stand up in the kitchen with walker. Daughter at bedside. Pt A & O x3 with periods of confusion.

## 2019-03-19 NOTE — FLOWSHEET NOTE
03/19/19 1005   Encounter Summary   Services provided to: Patient   Referral/Consult From: Rounding   Continue Visiting   (Seen 3/19.19, MONAE. )   Complexity of Encounter Low   Length of Encounter 15 minutes   Routine   Type Initial   Assessment Approachable   Intervention Nurtured hope   Outcome Expressed gratitude

## 2019-03-19 NOTE — ED PROVIDER NOTES
[address] [facility]          ATTENDING PHYSICIAN NOTE       Date of evaluation: 3/19/2019    Chief Complaint     No chief complaint on file. Knee pain    History of Present Illness     Jodi Strange is a 80 y.o. female on Eliquis who presents with a complaint of left knee pain. The patient suffered a mechanical fall at her nursing facility on Sunday. Since then, she's had increasing swelling of the left knee. Her daughter is at bedside and states that she has 24-hour assistance with her daughter helping her at night and another caregiver helping her during the day. States been increasingly difficult for her to get from her motorized wheelchair to her bathroom. The patient states that she sat on the toilet for approximately 4 hours last night was unable to get up because it was difficult to place weight on her knee. The patient daughter states she checked her blood pressure was markedly elevated and the patient complains of pain in her elbow and blood pressure cuff was squeezing her elbow. Is no report of trauma to the elbow. There is no head trauma associated with a fall onto her knee on Sunday. It is been no fever, chills, and no redness of the knee. Review of Systems     Review of Systems   Constitutional: Negative for chills, fatigue and fever. Respiratory: Negative for shortness of breath. Musculoskeletal: Positive for gait problem and joint swelling. Negative for neck pain and neck stiffness. Skin: Negative for color change, rash and wound. Neurological: Negative for weakness. Past Medical, Surgical, Family, and Social History     She has a past medical history of CAD (coronary artery disease), Hiatal hernia, Hyperlipidemia, Hypertension, and Macular degeneration of both eyes. She has a past surgical history that includes Diagnostic Cardiac Cath Lab Procedure; Cholecystectomy; Appendectomy;  Tonsillectomy and adenoidectomy; joint replacement; bladder repair (Bladder tuck); bladder suspension (2008); cardiovascular stress test (8/12); and doppler echocardiography (10/13). Her family history includes Heart Attack in her father and mother; Heart Disease in her father and mother; Other in her daughter. She reports that she has quit smoking. She has never used smokeless tobacco. She reports that she does not drink alcohol or use drugs. Medications     Previous Medications    ACETAMINOPHEN (TYLENOL) 325 MG TABLET    Take 650 mg by mouth nightly    ASPIRIN 81 MG EC TABLET    Take 81 mg by mouth daily. BENAZEPRIL (LOTENSIN) 10 MG TABLET    Take 0.5 tablets by mouth daily    CALCIUM CARBONATE 600 MG TABS TABLET    Take 1 tablet by mouth daily    DONEPEZIL (ARICEPT) 10 MG TABLET    TAKE 1 TABLET BY MOUTH NIGHTLY    ELIQUIS 2.5 MG TABS TABLET    TAKE 1 TABLET BY MOUTH TWICE DAILY    FAMOTIDINE (PEPCID) 20 MG TABLET    Take 1 tablet by mouth daily    FERROUS SULFATE-C-FOLIC ACID (FERROUS SULFATE-C-FA ER PO)    Take by mouth daily    METOPROLOL TARTRATE (LOPRESSOR) 25 MG TABLET    TAKE 1 TABLET BY MOUTH 2 TIMES DAILY. THIS REPLACES THE ATENOLOL. NITROGLYCERIN (NITROSTAT) 0.4 MG SL TABLET    up to max of 3 total doses. If no relief after 1 dose, call 911. SIMVASTATIN (ZOCOR) 20 MG TABLET    1 TABLET BY MOUTH AT BEDTIME * NO GRAPEFRUIT *    TRAMADOL (ULTRAM) 50 MG TABLET    Take 50 mg by mouth every 6 hours as needed for Pain. Allergies     She is allergic to pcn [penicillins] and sulfa antibiotics. Physical Exam     INITIAL VITALS: BP: (!) 201/78, Temp: 97.5 °F (36.4 °C), Pulse: 72, Resp: 14, SpO2: (!) 89 %    Physical Exam  Constitutional: Well nourished  female, confused who appears in no acute distress. HEENT: NC/AT. PERRL 4-2 bilaterally. EOMI. CV:Heart is regular rate and rhythm without murmurs, rubs or gallops. Resp: Respirations unlabored. Lungs clear to auscultation w/o wheezing.      Abd: Soft, nontender, nondistended. MSK:   Left knee is swollen without any overlying erythema or warmth. She is able to flex and extend it but cannot perform full range of motion secondary to degree of swelling. There is no overlying abrasion or laceration noted. Skin: Extremities are warm and well perfused. 2+ radial pulses. Cap Refill <3 seconds. Neuro: Patient is oriented to herself and otherwise confused, this appears to be baseline. Cranial nerves grossly intact   Strength and sensation intact x4 extremities. Diagnostic Results         RADIOLOGY:  XR CHEST PORTABLE   Final Result    Interval progression of the left infrahilar/basilar airspace opacity may be secondary to atelectasis/infiltrate/aspiration. XR KNEE LEFT (1-2 VIEWS)   Final Result   Severe tricompartmental osteoarthritis with joint effusion. Chondrocalcinosis.       CT HEAD WO CONTRAST    (Results Pending)       LABS:   Results for orders placed or performed during the hospital encounter of 03/19/19   Rapid Flu Swab   Result Value Ref Range    Rapid Influenza A Ag Negative Negative    Rapid Influenza B Ag Negative Negative   CBC auto differential   Result Value Ref Range    WBC 6.6 4.0 - 11.0 K/uL    RBC 4.10 4.00 - 5.20 M/uL    Hemoglobin 11.2 (L) 12.0 - 16.0 g/dL    Hematocrit 34.4 (L) 36.0 - 48.0 %    MCV 83.9 80.0 - 100.0 fL    MCH 27.2 26.0 - 34.0 pg    MCHC 32.4 31.0 - 36.0 g/dL    RDW 22.7 (H) 12.4 - 15.4 %    Platelets 250 087 - 335 K/uL    MPV 8.0 5.0 - 10.5 fL    Neutrophils % 88.0 %    Lymphocytes % 2.8 %    Monocytes % 8.5 %    Eosinophils % 0.1 %    Basophils % 0.6 %    Neutrophils # 5.8 1.7 - 7.7 K/uL    Lymphocytes # 0.2 (L) 1.0 - 5.1 K/uL    Monocytes # 0.6 0.0 - 1.3 K/uL    Eosinophils # 0.0 0.0 - 0.6 K/uL    Basophils # 0.0 0.0 - 0.2 K/uL   Basic Metabolic Panel   Result Value Ref Range    Sodium 140 136 - 145 mmol/L    Potassium 4.0 3.5 - 5.1 mmol/L    Chloride 102 99 - 110 mmol/L    CO2 28 21 - 32 mmol/L    Anion Gap 10 failed. She has no signs of pulmonary embolus and a believe her hypoxia may be due to acute pneumonia. We'll admit her to the hospital for antibiotics, and further respiratory therapy and continuation of new oxygen requirement. Clinical Impression     1. Pneumonia due to organism    2.  Hypoxia        Disposition     PATIENT REFERRED TO:  Isabel Kim MD  [address] [place] [address]  [phone]            DISCHARGE MEDICATIONS:  New Prescriptions    No medications on file       DISPOSITION Admitted 03/19/2019 11:17:18 AM         Julio Lundborg, MD  03/19/19 6600

## 2019-03-19 NOTE — H&P
normal.   Neck: Normal range of motion. No JVD present. Cardiovascular: Normal rate, regular rhythm and intact distal pulses. Murmur heard. Pulmonary/Chest: No stridor. She has no wheezes. Decreased effort, no crackles. Abdominal: Soft. Bowel sounds are normal.   Musculoskeletal: Normal range of motion. She exhibits tenderness. She exhibits no edema or deformity. Through out tenderness   Neurological: She is alert. Only orientated to self. Labs:     Recent Labs     03/17/19  1855 03/19/19  0909   WBC 5.1 6.6   HGB 10.9* 11.2*   HCT 33.8* 34.4*    235     Recent Labs     03/17/19  1855 03/19/19  0909    140   K 3.6 4.0    102   CO2 25 28   BUN 12 12   CREATININE 0.9 0.8   CALCIUM 9.3 9.0     No results for input(s): AST, ALT, BILIDIR, BILITOT, ALKPHOS in the last 72 hours. No results for input(s): INR in the last 72 hours. Recent Labs     03/19/19  1403   TROPONINI <0.01       Urinalysis:      Lab Results   Component Value Date    NITRU Negative 07/17/2018    WBCUA  02/07/2018    BACTERIA 4+ 02/07/2018    RBCUA 0-2 02/07/2018    BLOODU Negative 07/17/2018    SPECGRAV 1.020 07/17/2018    GLUCOSEU Negative 07/17/2018       Radiology:     CT HEAD WO CONTRAST   Final Result      1. NO ACUTE INTRACRANIAL ABNORMALITY. XR CHEST PORTABLE   Final Result    Interval progression of the left infrahilar/basilar airspace opacity may be secondary to atelectasis/infiltrate/aspiration. XR KNEE LEFT (1-2 VIEWS)   Final Result   Severe tricompartmental osteoarthritis with joint effusion. Chondrocalcinosis. ASSESSMENT & PLAN:    Altered mental status   - Patient seems acutely confused, Family not present to confirm or caretaker  - Cannot recall history of fall, not orientated to place or time.  Some baseline dementia has been document in clinic visits  - CT scan was done white matter hypodensities suggesting small vessel ischemic disease or age   related degenerative changes. No focal hypodensity to suggest acute ischemia.  - Follow up on Vitamin b12  - TSH   - Folate  - urine studies for possible UTI     Hypoxia secondary to Suspected Aspiration pneumonia  - No history of cough   - No oxygen at home but couldn't be weaned off oxygen   - required 2L in the ED, no complaints of shortness of breath   - chest xray Interval progression of the left infrahilar/basilar airspace opacity may be secondary to atelectasis/infiltrate/aspiration.   - Received dose of ceftriaxone and azithro in ED   - hold antibiotics for now due to low suspicion   - Follow up on SLP which rec SOFT dental diet    Paroxysmal Afib   - Currently in NSR   - Continue Plavix 2.5 BID   - Lopressor 25 BID     Diastolic heart failure, compensated   - Not fluid overloaded   - BNP 2000 which seems to be her baseline   - Hold fluids     CAD  - Simvastatin 25 BID  - ASA 81 mg    HTN   - Lisinopril 10   - Poorly controlled on original presentation      Dementia   - Continue donepizil     Anemia   - Ferrous sulfate 325 every MWF       DVT Prophylaxis: Plavix   Diet: DIET CARDIAC; Dental Soft     Steph Taylor MD    I will discussthe patient with the senior resident and Dr. Brisa Razo MD  Internal Medicine Resident, PGY-1    Addendum to Resident H& P/Progress note:  I have personally seen,examined and evaluated the patient.  I have reviewed the current history, physical findings, labs and assessment and plan and agree with note as documented by resident MD ( Jose Miller)      Ekaterina Erickson MD, [address]

## 2019-03-19 NOTE — ED NOTES
Home Med List is complete. [address] [place] [address], where the patient fills her meds. Able to confirm all medications in the Home Med List via recent fills, with the exception of OTC medications. Patient must purchase Iron, ASA, and calcium OTC.       Unable to determine when the patient last took her medications.        Lokesh GutiérrezD., BCPS   3/19/2019 12:58 PM  Wireless: [phone]

## 2019-03-19 NOTE — PROGRESS NOTES
RESPIRATORY THERAPY ASSESSMENT    Name:  Barry Frey  Record Number:  [id number]  Age: 80 y.o. Gender: female  : 1923  Today's Date:  3/19/2019  Room:  [place] [address]    Assessment     Is the patient being admitted for a COPD or Asthma exacerbation? No   (If yes the patient will be seen every 4 hours for the first 24 hours and then reassessed)    Patient Admission Diagnosis Pneumonia      Allergies  Allergies   Allergen Reactions    Pcn [Penicillins]      Patient tolerates Keflex    Sulfa Antibiotics        Minimum Predicted Vital Capacity:     850          Actual Vital Capacity:      250              Pulmonary History:No history  Home Oxygen Therapy:  room air  Home Respiratory Therapy:None   Current Respiratory Therapy:  Duo neb Q4wa  Treatment Type: IS       Respiratory Severity Index(RSI)   Patients with orders for inhalation medications, oxygen, or any therapeutic treatment modality will be placed on Respiratory Protocol. They will be assessed with the first treatment and at least every 72 hours thereafter. The following severity scale will be used to determine frequency of treatment intervention. Smoking History: No Smoking History = 0    Social History  Social History     Tobacco Use    Smoking status: Former Smoker    Smokeless tobacco: Never Used    Tobacco comment: Quit smoking 30 years ago.    Substance Use Topics    Alcohol use: No     Alcohol/week: 0.0 oz    Drug use: No       Recent Surgical History: None = 0  Past Surgical History  Past Surgical History:   Procedure Laterality Date    APPENDECTOMY      BLADDER REPAIR  Bladder tuck    Bladder tuck    BLADDER SUSPENSION      [place]    CARDIOVASCULAR STRESS TEST      negative - good EF    CHOLECYSTECTOMY      DIAGNOSTIC CARDIAC CATH LAB PROCEDURE      DOPPLER ECHOCARDIOGRAPHY  10/13    normal EF    JOINT REPLACEMENT      TONSILLECTOMY AND ADENOIDECTOMY         Level of Consciousness: Alert, Oriented, and Cooperative = 0    Level of Activity: Mostly sedentary, minimal walking = 2    Respiratory Pattern: Regular Pattern; RR 8-20 = 0    Breath Sounds: Diminished unilaterally = 1    Sputum   ,  ,    Cough: Strong, spontaneous, non-productive = 0    Vital Signs   BP (!) 165/69   Pulse 63   Temp 97.8 °F (36.6 °C) (Oral)   Resp 20   Ht 5' (1.524 m)   Wt 120 lb 1.6 oz (54.5 kg)   SpO2 100%   BMI 23.46 kg/m²   SPO2 (COPD values may differ): 90-91% on room air or greater than 92% on FiO2 24- 28% = 1    Peak Flow (asthma only): not applicable = 0    RSI: 0-4 = See once and convert to home regimen or discontinue        Plan       Goals: medication delivery, mobilize retained secretions, volume expansion and improve oxygenation    Patient/caregiver was educated on the proper method of use for Respiratory Care Devices:  Yes      Level of patient/caregiver understanding able to:   ? Verbalize understanding   ? Demonstrate understanding       ? Teach back        ? Needs reinforcement       ? No available caregiver               ? Other:     Response to education:  Herminia Sunshine     Is patient being placed on Home Treatment Regimen? No     Does the patient have everything they need prior to discharge? NA     Comments: reviewed chart and assessed patient     Plan of Care: Change to prn     Electronically signed by Aster Fagan RCP on 3/19/2019 at 3:40 PM    Respiratory Protocol Guidelines     1. Assessment and treatment by Respiratory Therapy will be initiated for medication and therapeutic interventions upon initiation of aerosolized medication. 2. Physician will be contacted for respiratory rate (RR) greater than 35 breaths per minute. Therapy will be held for heart rate (HR) greater than 140 beats per minute, pending direction from physician. 3. Bronchodilators will be administered via Metered Dose Inhaler (MDI) with spacer when the following criteria are met:  a.  Alert and cooperative     b. HR < 140 bpm  c. RR < 30 bpm d. Can demonstrate a 2-3 second inspiratory hold  4. Bronchodilators will be administered via Hand Held Nebulizer [facility]) to patients when ANY of the following criteria are met  a. Incognizant or uncooperative          b. Patients treated with HHN at Home        c. Unable to demonstrate proper use of MDI with spacer     d. RR > 30 bpm   5. Bronchodilators will be delivered via Metered Dose Inhaler (MDI), HHN, Aerogen to intubated patients on mechanical ventilation. 6. Inhalation medication orders will be delivered and/or substituted as outlined below. Aerosolized Medications Ordering and Administration Guidelines:    1. All Medications will be ordered by a physician, and their frequency and/or modality will be adjusted as defined by the patients Respiratory Severity Index (RSI) score. 2. If the patient does not have documented COPD, consider discontinuing anticholinergics when RSI is less than 9.  3. If the bronchospasm worsens (increased RSI), then the bronchodilator frequency can be increased to a maximum of every 4 hours. If greater than every 4 hours is required, the physician will be contacted. 4. If the bronchospasm improves, the frequency of the bronchodilator can be decreased, based on the patient's RSI, but not less than home treatment regimen frequency. 5. Bronchodilator(s) will be discontinued if patient has a RSI less than 9 and has received no scheduled or as needed treatment for 72  Hrs. Patients Ordered on a Mucolytic Agent:    1. Must always be administered with a bronchodilator. 2. Discontinue if patient experiences worsened bronchospasm, or secretions have lessened to the point that the patient is able to clear them with a cough. Anti-inflammatory and Combination Medications:    1.  If the patient lacks prior history of lung disease, is not using inhaled anti-inflammatory medication at home, and lacks wheezing by examination or by history for at least 24 hours, contact physician for possible discontinuation.

## 2019-03-19 NOTE — PROGRESS NOTES
Speech Language Pathology  Facility/Department: [address] [address]   BEDSIDE SWALLOW EVALUATION    NAME: Roberta Bonner  : 1923  MRN: [id number]    ADMISSION DATE: 3/19/2019  ADMITTING DIAGNOSIS: has Benign essential HTN; Macular degeneration of both eyes; CAD (coronary artery disease); Hyperlipidemia; Hiatal hernia; Depressive disorder; Osteoporosis; Scoliosis; Memory difficulties; Trigger finger of left hand; NSTEMI (non-ST elevated myocardial infarction) ([address] Sinus bradycardia; Preventative health care; Nonrheumatic aortic valve insufficiency; Paroxysmal atrial fibrillation ([address] Heart murmur; Diastolic dysfunction; Anemia; Erythema of lower extremity; Left leg numbness; Community acquired pneumonia; Acute respiratory failure with hypoxia ([address] Hypertensive urgency; Anticoagulated; Fall at home; and Nonrheumatic mitral valve regurgitation on their problem list.  ONSET DATE: 3/19/19    Recent Chest Xray 3/19/19  Interval progression of the left infrahilar/basilar airspace opacity may be secondary to atelectasis/infiltrate/aspiration.             CT of head 3/19/19   NO ACUTE INTRACRANIAL ABNORMALITY. Date of Eval: 3/19/2019  Evaluating Therapist: Luis Alberto Oswald    Current Diet level:  Current Diet : Regular  Current Liquid Diet : Thin      Primary Complaint  Patient Complaint: \"Why isn't someone sitting in here with me?\"     Pain:  Pain Assessment  Patient Currently in Pain: Denies      Reason for Referral  Roberta Bonner was referred for a bedside swallow evaluation to assess the efficiency of her swallow function, identify signs and symptoms of aspiration and make recommendations regarding safe dietary consistencies, effective compensatory strategies, and safe eating environment. Impression  Pt seen in the ED after being admitted following a fall. Pt alert, cooperative with periods of confusion.  Oral- Pt with only front 4 upper teeth- partial plate is not present- and has lower teeth. Dentition is in poor health- there is a dried film on front teeth and tongue and lips are dry. Mastication with ice chips and cracker was slow but functional. Pt required frequent cues to not talk while food was in her mouth. Pt with no anterior spillage of any consistency. Pharyngeal- Pt demonstrated no s/s aspiration with any consistency presented. Pt able to initiate swallow in a timely manner with good laryngeal elevation. Vocal quality remained clear throughout. No coughing, throat clearing or choking observed with any consistency. Pt unable to follow directions to complete 3oz water test.   Recommend downgrade diet to dental soft given lack of upper partial denture. Dysphagia Diagnosis: Swallow function appears grossly intact  Dysphagia Outcome Severity Scale: Level 6: Within functional limits/Modified independence     Treatment Plan  Requires SLP Intervention: Yes  Duration/Frequency of Treatment: 1-3x  D/C Recommendations: To be determined       Recommended Diet and Intervention  Diet Solids Recommendation: Dental Soft  Liquid Consistency Recommendation: Thin-Make NPO if s/s aspiration emerge and alert SLP  Given lung status per CXR and age, will see pt 1-3 more times. Recommended Form of Meds: PO  Therapeutic Interventions: Diet tolerance monitoring;Patient/Family education    Compensatory Swallowing Strategies  Compensatory Swallowing Strategies: Upright as possible for all oral intake    Treatment/Goals  1- The patient will tolerate recommended diet without observed clinical signs of aspiration    2- The pt/family will demonstrate understanding of swallowing recommendations and concerns. 3/19-  The pt was educated to purpose of the visit and relationship between lung status and swallowing. Pt with questionable comprehension. con't goal       General  Chart Reviewed: Yes  Behavior/Cognition: Alert; Cooperative;Pleasant mood;Confused  Respiratory Status: O2 via nasual

## 2019-03-20 ENCOUNTER — APPOINTMENT (OUTPATIENT)
Dept: CT IMAGING | Age: 84
DRG: 189 | End: 2019-03-20
Payer: MEDICARE

## 2019-03-20 LAB
ANION GAP SERPL CALCULATED.3IONS-SCNC: 15 MMOL/L (ref 3–16)
ANISOCYTOSIS: ABNORMAL
BASOPHILS ABSOLUTE: 0 K/UL (ref 0–0.2)
BASOPHILS RELATIVE PERCENT: 0.2 %
BILIRUBIN URINE: NEGATIVE
BLOOD, URINE: ABNORMAL
BUN BLDV-MCNC: 14 MG/DL (ref 7–20)
BURR CELLS: ABNORMAL
CALCIUM SERPL-MCNC: 9 MG/DL (ref 8.3–10.6)
CHLORIDE BLD-SCNC: 100 MMOL/L (ref 99–110)
CLARITY: CLEAR
CO2: 27 MMOL/L (ref 21–32)
COLOR: YELLOW
CREAT SERPL-MCNC: 0.7 MG/DL (ref 0.6–1.2)
EOSINOPHILS ABSOLUTE: 0 K/UL (ref 0–0.6)
EOSINOPHILS RELATIVE PERCENT: 0 %
EPITHELIAL CELLS, UA: ABNORMAL /HPF
GFR AFRICAN AMERICAN: >60
GFR NON-AFRICAN AMERICAN: >60
GLUCOSE BLD-MCNC: 127 MG/DL (ref 70–99)
GLUCOSE URINE: NEGATIVE MG/DL
HCT VFR BLD CALC: 38.4 % (ref 36–48)
HEMOGLOBIN: 12.4 G/DL (ref 12–16)
KETONES, URINE: NEGATIVE MG/DL
LEUKOCYTE ESTERASE, URINE: ABNORMAL
LYMPHOCYTES ABSOLUTE: 0.2 K/UL (ref 1–5.1)
LYMPHOCYTES RELATIVE PERCENT: 2.3 %
MCH RBC QN AUTO: 27.8 PG (ref 26–34)
MCHC RBC AUTO-ENTMCNC: 32.2 G/DL (ref 31–36)
MCV RBC AUTO: 86.3 FL (ref 80–100)
MICROSCOPIC EXAMINATION: YES
MONOCYTES ABSOLUTE: 0.9 K/UL (ref 0–1.3)
MONOCYTES RELATIVE PERCENT: 9 %
NEUTROPHILS ABSOLUTE: 9.3 K/UL (ref 1.7–7.7)
NEUTROPHILS RELATIVE PERCENT: 88.5 %
NITRITE, URINE: NEGATIVE
OVALOCYTES: ABNORMAL
PDW BLD-RTO: 22.1 % (ref 12.4–15.4)
PH UA: 5.5 (ref 5–8)
PLATELET # BLD: 210 K/UL (ref 135–450)
PMV BLD AUTO: 7.8 FL (ref 5–10.5)
POLYCHROMASIA: ABNORMAL
POTASSIUM REFLEX MAGNESIUM: 3.8 MMOL/L (ref 3.5–5.1)
PROTEIN UA: 30 MG/DL
RBC # BLD: 4.45 M/UL (ref 4–5.2)
RBC UA: ABNORMAL /HPF (ref 0–2)
SLIDE REVIEW: ABNORMAL
SODIUM BLD-SCNC: 142 MMOL/L (ref 136–145)
SPECIFIC GRAVITY UA: >=1.03 (ref 1–1.03)
TEAR DROP CELLS: ABNORMAL
URINE TYPE: ABNORMAL
UROBILINOGEN, URINE: 0.2 E.U./DL
WBC # BLD: 10.5 K/UL (ref 4–11)
WBC UA: ABNORMAL /HPF (ref 0–5)

## 2019-03-20 PROCEDURE — 36415 COLL VENOUS BLD VENIPUNCTURE: CPT

## 2019-03-20 PROCEDURE — 80048 BASIC METABOLIC PNL TOTAL CA: CPT

## 2019-03-20 PROCEDURE — 97166 OT EVAL MOD COMPLEX 45 MIN: CPT

## 2019-03-20 PROCEDURE — 99232 SBSQ HOSP IP/OBS MODERATE 35: CPT | Performed by: HOSPITALIST

## 2019-03-20 PROCEDURE — 1200000000 HC SEMI PRIVATE

## 2019-03-20 PROCEDURE — 92526 ORAL FUNCTION THERAPY: CPT

## 2019-03-20 PROCEDURE — 6370000000 HC RX 637 (ALT 250 FOR IP): Performed by: STUDENT IN AN ORGANIZED HEALTH CARE EDUCATION/TRAINING PROGRAM

## 2019-03-20 PROCEDURE — 71250 CT THORAX DX C-: CPT

## 2019-03-20 PROCEDURE — 85025 COMPLETE CBC W/AUTO DIFF WBC: CPT

## 2019-03-20 PROCEDURE — 97535 SELF CARE MNGMENT TRAINING: CPT

## 2019-03-20 PROCEDURE — 81001 URINALYSIS AUTO W/SCOPE: CPT

## 2019-03-20 PROCEDURE — 97162 PT EVAL MOD COMPLEX 30 MIN: CPT

## 2019-03-20 PROCEDURE — 97530 THERAPEUTIC ACTIVITIES: CPT

## 2019-03-20 PROCEDURE — 6360000002 HC RX W HCPCS: Performed by: STUDENT IN AN ORGANIZED HEALTH CARE EDUCATION/TRAINING PROGRAM

## 2019-03-20 PROCEDURE — 2580000003 HC RX 258: Performed by: STUDENT IN AN ORGANIZED HEALTH CARE EDUCATION/TRAINING PROGRAM

## 2019-03-20 RX ORDER — AMLODIPINE BESYLATE 10 MG/1
10 TABLET ORAL DAILY
Status: DISCONTINUED | OUTPATIENT
Start: 2019-03-20 | End: 2019-03-21 | Stop reason: HOSPADM

## 2019-03-20 RX ORDER — LISINOPRIL 20 MG/1
20 TABLET ORAL DAILY
Status: DISCONTINUED | OUTPATIENT
Start: 2019-03-21 | End: 2019-03-21 | Stop reason: HOSPADM

## 2019-03-20 RX ADMIN — TRAMADOL HYDROCHLORIDE 50 MG: 50 TABLET, FILM COATED ORAL at 12:36

## 2019-03-20 RX ADMIN — Medication 12.5 MG: at 20:46

## 2019-03-20 RX ADMIN — TRAMADOL HYDROCHLORIDE 50 MG: 50 TABLET, FILM COATED ORAL at 03:29

## 2019-03-20 RX ADMIN — CALCIUM 500 MG: 500 TABLET ORAL at 10:04

## 2019-03-20 RX ADMIN — AMLODIPINE BESYLATE 10 MG: 10 TABLET ORAL at 12:37

## 2019-03-20 RX ADMIN — APIXABAN 2.5 MG: 5 TABLET, FILM COATED ORAL at 20:46

## 2019-03-20 RX ADMIN — Medication 10 ML: at 09:52

## 2019-03-20 RX ADMIN — Medication 10 ML: at 20:45

## 2019-03-20 RX ADMIN — SIMVASTATIN 20 MG: 20 TABLET, FILM COATED ORAL at 20:46

## 2019-03-20 RX ADMIN — FERROUS SULFATE TAB 325 MG (65 MG ELEMENTAL FE) 325 MG: 325 (65 FE) TAB at 09:53

## 2019-03-20 RX ADMIN — APIXABAN 2.5 MG: 5 TABLET, FILM COATED ORAL at 09:51

## 2019-03-20 RX ADMIN — METOPROLOL TARTRATE 25 MG: 25 TABLET ORAL at 09:46

## 2019-03-20 RX ADMIN — DONEPEZIL HYDROCHLORIDE 10 MG: 10 TABLET, FILM COATED ORAL at 20:46

## 2019-03-20 RX ADMIN — FAMOTIDINE 20 MG: 20 TABLET ORAL at 09:46

## 2019-03-20 RX ADMIN — ASPIRIN 81 MG: 81 TABLET ORAL at 09:46

## 2019-03-20 RX ADMIN — LISINOPRIL 10 MG: 10 TABLET ORAL at 09:46

## 2019-03-20 RX ADMIN — CEFTRIAXONE 1 G: 1 INJECTION, POWDER, FOR SOLUTION INTRAMUSCULAR; INTRAVENOUS at 12:03

## 2019-03-20 ASSESSMENT — PAIN DESCRIPTION - ORIENTATION
ORIENTATION: RIGHT;LEFT;INNER
ORIENTATION: RIGHT;LEFT;INNER

## 2019-03-20 ASSESSMENT — PAIN DESCRIPTION - DESCRIPTORS
DESCRIPTORS: ACHING
DESCRIPTORS: ACHING

## 2019-03-20 ASSESSMENT — PAIN SCALES - PAIN ASSESSMENT IN ADVANCED DEMENTIA (PAINAD)
TOTALSCORE: 0
NEGVOCALIZATION: 0
FACIALEXPRESSION: 0
BODYLANGUAGE: 0
BREATHING: 0
BODYLANGUAGE: 0
CONSOLABILITY: 0
CONSOLABILITY: 0
NEGVOCALIZATION: 0
BREATHING: 0
TOTALSCORE: 0
FACIALEXPRESSION: 0

## 2019-03-20 ASSESSMENT — PAIN SCALES - GENERAL
PAINLEVEL_OUTOF10: 0
PAINLEVEL_OUTOF10: 5
PAINLEVEL_OUTOF10: 0
PAINLEVEL_OUTOF10: 5

## 2019-03-20 ASSESSMENT — PAIN DESCRIPTION - LOCATION
LOCATION: FOOT
LOCATION: FOOT

## 2019-03-20 ASSESSMENT — PAIN DESCRIPTION - FREQUENCY
FREQUENCY: INTERMITTENT
FREQUENCY: CONTINUOUS

## 2019-03-20 ASSESSMENT — PAIN DESCRIPTION - ONSET
ONSET: ON-GOING
ONSET: ON-GOING

## 2019-03-20 ASSESSMENT — PAIN DESCRIPTION - PROGRESSION
CLINICAL_PROGRESSION: NOT CHANGED
CLINICAL_PROGRESSION: NOT CHANGED

## 2019-03-20 ASSESSMENT — PAIN - FUNCTIONAL ASSESSMENT: PAIN_FUNCTIONAL_ASSESSMENT: PREVENTS OR INTERFERES SOME ACTIVE ACTIVITIES AND ADLS

## 2019-03-20 ASSESSMENT — PAIN DESCRIPTION - PAIN TYPE
TYPE: CHRONIC PAIN
TYPE: ACUTE PAIN;CHRONIC PAIN

## 2019-03-20 NOTE — PLAN OF CARE
Problem: Musculor/Skeletal Functional Status  Intervention: PT Evaluation/treatment  Note:   Increase safety and independence with functional mobility.

## 2019-03-20 NOTE — PROGRESS NOTES
Internal Medicine PGY- 1 Resident Progress Note        PCP: Ramiro Iyer MD    Date of Admission: 3/19/2019    Chief Complaint: confusion     Subjective: Patient was seen this morning at bedside. She was sleeping comfortably. She still is alert and orientated to her self. She did say she feels fine and has poor understanding of why she is the hospital. Nursing staff says there were no acute events overnight. She was resting comfortably throughout the night. She is using the bed pan. Appears to be more forgetful in conversation. Her heart rate drops to high 40's at times but is completely asymptomatic. Lost her voice overnight with no complaints of a sore throat or cough. Medications:  Reviewed    Infusion Medications   Scheduled Medications    cefTRIAXone (ROCEPHIN) IV  1 g Intravenous Q24H    [START ON 3/21/2019] lisinopril  20 mg Oral Daily    aspirin  81 mg Oral Daily    calcium elemental  500 mg Oral Daily    donepezil  10 mg Oral Nightly    apixaban  2.5 mg Oral BID    famotidine  20 mg Oral Daily    metoprolol tartrate  25 mg Oral BID    simvastatin  20 mg Oral Nightly    sodium chloride flush  10 mL Intravenous 2 times per day    ferrous sulfate  325 mg Oral Q MWF     PRN Meds: traMADol, sodium chloride flush, magnesium hydroxide, ondansetron, ipratropium-albuterol      Intake/Output Summary (Last 24 hours) at 3/20/2019 1115  Last data filed at 3/20/2019 0617  Gross per 24 hour   Intake 360 ml   Output --   Net 360 ml       Physical Exam Performed:    BP (!) 172/81   Pulse 60   Temp 97.7 °F (36.5 °C) (Axillary)   Resp 16   Ht 5' (1.524 m)   Wt 114 lb 10.2 oz (52 kg)   SpO2 93%   BMI 22.39 kg/m²     Physical Exam   Constitutional: She appears cachectic. She is cooperative. She has a sickly appearance. HENT:   Head: Normocephalic and atraumatic. Eyes: Pupils are equal, round, and reactive to light. Conjunctivae and EOM are normal.   Neck: Normal range of motion. No JVD present. degenerative changes. No focal hypodensity to suggest acute ischemia.  - Follow up on Vitamin b12  - TSH   - Folate  - urine studies for possible UTI are still pending but already received antibiotics so results will be disturbed     Hypoxia secondary to Suspected Aspiration pneumonia (resolved)  - On room air this morning  - No history of cough   - No oxygen at home  - chest xray Interval progression of the left infrahilar/basilar airspace opacity may be secondary to atelectasis/infiltrate/aspiration. - potentially stop AB if CT scan is negative, received two days of ceftriaxone   - Follow up on SLP which rec SOFT dental diet     Paroxysmal Afib   - Currently in NSR   - Continue Plavix 2.5 BID   - decrease Lopressor to 12.5 BID due to bradycardia       Diastolic heart failure, compensated   - Not fluid overloaded   - BNP 2000 which seems to be her baseline   - Hold fluids      CAD  - Simvastatin 25 BID  - ASA 81 mg     HTN   - increase Lisinopril 20 mg  - Start amlodipine 10 mg for hypertension   - Poorly controlled on original presentation      Dementia   - Continue donepizil      Anemia   - Ferrous sulfate 325 every MWF     DVT Prophylaxis: Plavix   Diet: DIET CARDIAC; Dental Soft  Code Status: Limited    PT/OT Eval Status: Pending     Baby Cockayne, MD    I will discuss the patient with the senior resident and MD Jose Miguel Porras MD  Internal Medicine Resident PGY- 1     Addendum to Resident H& P/Progress note:  I have personally seen,examined and evaluated the patient. I have reviewed the current history, physical findings, labs and assessment and plan and agree with note as documented by resident MD ( )    Chest CT without IV contrast:   1. Moderate elevation of left hemidiaphragm, unchanged.       2. Mild lingular and left lower lobe atelectasis.       3. 0.8 cm noncalcified pulmonary nodule within right upper lobe.  Follow-up chest CT should be performed at C6-12 months.       4. Scattered arterial calcifications.          Montell Sandifer, MD, FACP

## 2019-03-20 NOTE — CARE COORDINATION
SW attempted to met with patient at bedside to complete assessment. SW met her caregiver at bedside. Patient was getting off the phone with her daughter. She reported her daughter was going to [place] for two weeks. SW offered to complete assessment on this date and discuss discharge planning. Patient stated she just worked with PT, OT, and Speech and asked for SW to come back tomorrow. She was willing to meet with LUÍS in the am.     LUÍS was able to confirm she is from [facility] in [place] Uses a walker at baseline. SW to follow.        Maicol Adan, MSW, LSW     [facility]   [phone]

## 2019-03-20 NOTE — PROGRESS NOTES
Physical Therapy    Facility/Department: [address]  Initial Assessment/Treatment    NAME: Alicia Harding  : 1923  MRN: [id number]    Date of Service: 3/20/2019    Discharge Recommendations:    Alicia Harding scored a 10/24 on the AM-PAC short mobility form. Current research shows that an AM-PAC score of 17 or less is typically not associated with a discharge to the patient's home setting. Based on the patients AM-PAC score and their current functional mobility deficits, it is recommended that the patient have 3-5 sessions per week of Physical Therapy at d/c to increase the patients independence. PT Equipment Recommendations  Equipment Needed: No    Assessment   Body structures, Functions, Activity limitations: Decreased functional mobility ; Decreased strength;Decreased endurance;Decreased ROM; Decreased balance  Assessment: 80 y.o. female with Afib on eliquis, HTN, Valvular heart disease (Mitral regurg), who presents with a complaint of a some confusion and a fall two days ago. Pt currently requiring Ax1-2 for all functional mobility. Pt Is well below her baseline and will require further skilled PT to return to [facility]. Pt's private duty caregiver reports thinking [facility] SNF section. Will continue to follow. Treatment Diagnosis: Decreased functional mobility   Prognosis: Good  Decision Making: Medium Complexity  Patient Education: role of PT, use of call light, d/c planning pt verb understanding  Barriers to Learning: cognition   REQUIRES PT FOLLOW UP: Yes  Activity Tolerance  Activity Tolerance: Patient limited by endurance; Patient limited by pain; Patient limited by fatigue;Patient limited by cognitive status       Patient Diagnosis(es): The primary encounter diagnosis was Pneumonia due to organism. A diagnosis of Hypoxia was also pertinent to this visit.      has a past medical history of CAD (coronary artery disease), Hiatal hernia, Hyperlipidemia, Hypertension, and Macular companionship )  ADL Assistance: Independent  Homemaking Assistance: Independent  Ambulation Assistance: Independent(with 4TJ)  Transfer Assistance: Independent  Active : No  Patient's  Info: driving service provided by the facility   Occupation: Retired  Leisure & Hobbies: watching movies  Additional Comments: Pt reports no falls in the past 6 months besides the one that brought her in. Cognition        Objective          AROM RLE (degrees)  RLE General AROM: Adequate for functional mobility   AROM LLE (degrees)  LLE General AROM: Adequate for functional mobility   Strength RLE  Comment: Globally >3+/5 per pt's ability to perform functional mobility   Strength LLE  Comment: Globally >3+/5 per pt's ability to perform functional mobility         Bed mobility  Supine to Sit: Stand by assistance(HOB raised, no use of rail- increased time and effort required - constant VC to stay task )  Scooting: Minimal assistance(to initiate - pt highly distracted requiring constant cues to stay on task )  Transfers  Sit to Stand: 2 Person Assistance(Mod/Max Ax2 from EOB x2 trials)  Stand to sit: 2 Person Assistance(Mod/Max Ax2 to EOB, to recliner)  Ambulation  Ambulation?: Yes  Ambulation 1  Surface: level tile  Device: Rolling Walker  Assistance: 2 Person assistance(Mod/Max Ax2 )  Quality of Gait: Very slow and effortful gait with c/o L knee pain with WB and amb, fatiguing quickly   Distance: 3'   Comments: VC's and RW management required   Stairs/Curb  Stairs?: No     Balance  Posture: Fair  Sitting - Static: Good  Sitting - Dynamic: Good  Standing - Static: Poor  Standing - Dynamic: Poor(with mod/max Ax2)      PT evaluation and treatment initiated. Treatment included gait and transfer training as well as patient education.     Plan   Plan  Times per week: 2-5  Times per day: Daily  Current Treatment Recommendations: Strengthening, ROM, Gait Training, Balance Training, Endurance Training, Transfer Training, Functional Mobility Training, Home Exercise Program, Safety Education & Training  Safety Devices  Type of devices: Call light within reach, Left in chair, Chair alarm in place, Nurse notified, Gait belt      AM-PAC Score  AM-PAC Inpatient Mobility Raw Score : 10  AM-PAC Inpatient T-Scale Score : 32.29  Mobility Inpatient CMS 0-100% Score: 76.75  Mobility Inpatient CMS G-Code Modifier : CL          Goals  Short term goals  Time Frame for Short term goals: d/c  Short term goal 1:  sup<>sit supervision   Short term goal 2: sit<>stand SBA with RW  Short term goal 3: amb 48' with RW and CGA  Patient Goals   Patient goals : return to the [address]   Individual Concurrent Group Co-treatment   Time In 1325         Time Out 1418         Minutes 53           Timed Code Treatment Minutes:  38    Total Treatment Minutes: 53     If the patient is discharged before the next treatment session, this note will serve as the discharge summary.      Ada Cabrera, PT, DPT [id number]

## 2019-03-20 NOTE — PROGRESS NOTES
Occupational Therapy   Occupational Therapy Initial Assessment and Treatment     Date: 3/20/2019   Patient Name: Ulysses Brink  MRN: [id number]     : 1923    Date of Service: 3/20/2019    Discharge Recommendations:  Ulysses Brink scored a 17/24 on the AM-PAC ADL Inpatient form. Current research shows that an AM-PAC score of 17 or less is typically not associated with a discharge to the patient's home setting. Based on the patients AM-PAC score and their current ADL deficits, it is recommended that the patient have 3-5 sessions per week of Occupational Therapy at d/c to increase the patients independence. OT Equipment Recommendations  Equipment Needed: No  Other: defer recommendations to discharge facility    Assessment   Performance deficits / Impairments: Decreased functional mobility ; Decreased ADL status; Decreased endurance;Decreased balance  Assessment: Pt admitted from [place] w/ c/o L knee pain s/p fall. Pt does not recall fall. Prior to fall, pt was independent w/ ADLs, transfers, and functional mobility using wh walker. Pt does have a private duty caregiver during the day hours, but does not have 24 hour assistance (dtr is out-of-town). Pt is currently needing assist of 2 for transfers at this time - only tolerating a few steps. Pt will benefit from continued OT at discharge. Pt is not safe to return home alone. Continue per POC. Treatment Diagnosis: impaired ADLs/transfers s/p fall  Patient Education: role of OT -stated understanding, continue to teach prn  REQUIRES OT FOLLOW UP: Yes  Activity Tolerance  Activity Tolerance: Patient limited by pain  Safety Devices  Safety Devices in place: Yes  Type of devices: Left in chair;Call light within reach; Chair alarm in place;Nurse notified(pt's private duty caregiver at bedside; [employer] camera in room)           Patient Diagnosis(es): The primary encounter diagnosis was Pneumonia due to organism.  A diagnosis of Hypoxia was also pertinent to this visit. has a past medical history of CAD (coronary artery disease), Hiatal hernia, Hyperlipidemia, Hypertension, and Macular degeneration of both eyes. has a past surgical history that includes Diagnostic Cardiac Cath Lab Procedure; Cholecystectomy; Appendectomy; Tonsillectomy and adenoidectomy; joint replacement; bladder repair (Bladder tuck); bladder suspension (2008); cardiovascular stress test (8/12); and doppler echocardiography (10/13). Treatment Diagnosis: impaired ADLs/transfers s/p fall      Restrictions  Position Activity Restriction  Other position/activity restrictions: up with assist     Subjective   General  Chart Reviewed: Yes  Additional Pertinent Hx: 80 y.o. female on Eliquis who presents with a complaint of left knee pain (s/p mechanical fall at [facility]). Hospital Course:  L knee xray: Severe tricompartmental osteoarthritis with joint effusion; CXR: atelectasis/infiltrate/aspiration; CT Head: neg. PMH:  Afib on eliquis, HTN, Valvular heart disease (Mitral regurg). Family / Caregiver Present: Yes(privated duty caregiver)  Referring Practitioner: Jillian Rodriguez MD  Diagnosis: Community Acquired Pneumonia    Subjective  Subjective: Supine in bed on entry. Pt pleasant and cooperative. Pt perseverating about shower safety at home (the bars, the rubber mat, fold out seat, etc).       Pain Assessment  Pain Assessment: L knee pain - not rated, nurse aware     Social/Functional History  Social/Functional History  Lives With: Alone  Type of Home: Facility([facility] )  Home Access: Level entry, Elevator  Bathroom Shower/Tub: Walk-in shower  Bathroom Toilet: Handicap height  Bathroom Equipment: 3-in-1 commode, Grab bars in shower, Hand-held shower(toilet safety frame on toilet)  Bathroom Accessibility: Accessible  Home Equipment: 4 wheeled walker, Alert [employer] Help From: Other (comment)(pt has private duty caregivers during the day for A prn - however caregiver reports mainly there for companionship )  ADL Assistance: Independent  Homemaking Assistance: Independent  Ambulation Assistance: Independent(with 1AP)  Transfer Assistance: Independent  Active : No  Patient's  Info: driving service provided by the facility   Occupation: Retired  Leisure & Hobbies: watching movies  Additional Comments: Pt reports no falls in the past 6 months besides the one that brought her in.        Objective   Vision: Impaired  Vision Exceptions: Wears glasses at all times  Hearing: Within functional limits      Orientation  Overall Orientation Status: Impaired  Orientation Level: Oriented to person(ONLY)        Bed mobility  Supine to Sit: Stand by assistance(HOB raised, no use of rail- increased time and effort required - constant VC to stay task )  Scooting: Minimal assistance(to initiate - pt highly distracted requiring constant cues to stay on task )      Balance  Sitting Balance: Stand by assistance  Standing Balance: Maximum assistance(w/ UE support of walker; posterior lean, cues for safe hand placement on walker)    Standing Balance/Tolerance for Standing Activity  Time: ~2-3 minutes  Activity: transfers bed to chair using wh walker  Comment: fatigued; decreased standing tolerance 2* L knee pain - nurse aware    Toilet Transfers  Toilet - Technique: Stand step(using wh walker)  Equipment Used: Standard bedside commode(simulated w/ bedside chair)  Toilet Transfer: 2 Person assistance(Max A of 2)    ADL  Feeding: Independent(demo ROM WFL; currently NPO for swallow evaluation -SLP present at end of treatment to address swallow eval)  Grooming: Independent(seated w/ setup)  LE Dressing: Dependent/Total(attempted to don/doff socks via figure four method - unable)     Tone RUE  RUE Tone: Normotonic  Tone LUE  LUE Tone: Normotonic  Coordination  Movements Are Fluid And Coordinated: Yes        Transfers  Stand Step Transfers: 2 Person assistance(Max A of 2 using wh walker; c/o L knee pain w/ WB)  Sit to stand: 2 Person assistance (Mod A of 2) (from elevated bed x 2 times)  Stand to sit: 2 Person assistance (Mod A of 2 )  Transfer Comments: Note: discussed recommendation to utilize Walterine Figures w/ gait belt at this time (for bathroom access and to get up to chair)     Cognition  Overall Cognitive Status: Exceptions  Following Commands: Follows one step commands with repetition(easiliy distracted)  Attention Span: Attends with cues to redirect  Memory: Decreased recall of recent events;Decreased short term memory  Insights: Decreased awareness of deficits  Initiation: Requires cues for some  Cognition Comment: pleasant and cooperative; pt's private duty caregiver states pt does not keep track of time at baseline - refers to date on computer each morning                    LUE Strength  Gross LUE Strength: WFL  LUE Strength Comment: not formally assessed, but [facility] for functional transfers  RUE Strength  Gross RUE Strength: WFL  RUE Strength Comment: not formally assessed, but [facility] for functional transfers           Pt seen by OT for eval and treat. Treatment included:  Bed mobility, unsupported sitting, functional mobility, ADL, pt education            Plan  This note will serve as a discharge summary in the event the patient is discharged prior to next treatment session.     Plan  Times per week: 2-5  Times per day: Daily  Current Treatment Recommendations: Strengthening, Balance Training, Functional Mobility Training, Endurance Training, Patient/Caregiver Education & Training, Safety Education & Training, Self-Care / ADL                                                      AM-PAC Score        AM-[facility] Inpatient Daily Activity Raw Score: 17  AM-PAC Inpatient ADL T-Scale Score : 37.26  ADL Inpatient CMS 0-100% Score: 50.11  ADL Inpatient CMS G-Code Modifier : CK    Goals  Short term goals  Time Frame for Short term goals: Discharge  Short term goal 1: simulated BSC w/ Min A of 2  Short term goal 2: increase standing tolerance to 5 minutes for engagement in ADLs  Short term goal 3: LB dressing w/ or w/o AE and Mod A  Short term goal 4: chair pushups x 5 w/ CGA for increased functional strength for transfers  Patient Goals   Patient goals : to regain independence for return to apartment       Therapy Time   Individual Concurrent Group Co-treatment   Time In 1325         Time Out 1418         Minutes 53           Timed Code Treatment Minutes:  38 Minutes    Total Treatment Minutes:  [address] [facility] OTR/L #[id number]

## 2019-03-20 NOTE — PLAN OF CARE
Problem: Falls - Risk of:  Goal: Will remain free from falls  Description  Will remain free from falls  Outcome: Ongoing  Note:   Bed locked in lowest position. 3/4 Bed rails up. Call light within reach. Pt belongings within reach. Bedside table within reach. Pt instructed to use call light prior to getting up. Will continue to monitor.

## 2019-03-20 NOTE — PROGRESS NOTES
Speech Language Pathology  Facility/Department: [address]  Dysphagia Daily Treatment Note    NAME: Carmita Chirinos  : 1923  MRN: [id number]    Patient Diagnosis(es):   Patient Active Problem List    Diagnosis Date Noted    Community acquired pneumonia 2019    Acute respiratory failure with hypoxia ([address] 2019    Hypertensive urgency 2019    Anticoagulated 2019    Fall at home 2019    Nonrheumatic mitral valve regurgitation 2019    Pneumonia due to organism     Hypoxia     Confusion     Chronic diastolic heart failure ([address]     Left leg numbness 2019    Erythema of lower extremity 10/29/2018    Anemia 2018    Heart murmur     Diastolic dysfunction     Paroxysmal atrial fibrillation ([address]     Preventative health care 2018    Nonrheumatic aortic valve insufficiency 2018    NSTEMI (non-ST elevated myocardial infarction) ([address] 2018    Sinus bradycardia     Trigger finger of left hand 2013    Osteoporosis 2013    Scoliosis 2013    Memory difficulties 2013    Depressive disorder 2013    Macular degeneration of both eyes     CAD (coronary artery disease)     Hyperlipidemia     Hiatal hernia     Essential hypertension 2011     Allergies: Allergies   Allergen Reactions    Pcn [Penicillins]      Patient tolerates Keflex    Sulfa Antibiotics          CXR (3/19/19)-  Recent Chest Xray 3/19/19  Interval progression of the left infrahilar/basilar airspace opacity may be secondary to atelectasis/infiltrate/aspiration. Previous MBS: none    Chart reviewed. Medical Diagnosis: Community acquired  Pneumonia  Treatment Diagnosis: Dysphagia    BSE Impression (3/19/19)-  Impression  Pt seen in the ED after being admitted following a fall. Pt alert, cooperative with periods of confusion.  Oral- Pt with only front 4 upper teeth- partial plate is not present- and has lower teeth. Dentition is in poor health- there is a dried film on front teeth and tongue and lips are dry. Mastication with ice chips and cracker was slow but functional. Pt required frequent cues to not talk while food was in her mouth. Pt with no anterior spillage of any consistency. Pharyngeal- Pt demonstrated no s/s aspiration with any consistency presented. Pt able to initiate swallow in a timely manner with good laryngeal elevation. Vocal quality remained clear throughout. No coughing, throat clearing or choking observed with any consistency. Pt unable to follow directions to complete 3oz water test.   Recommend downgrade diet to dental soft given lack of upper partial denture. MBS results :  Not applicable at this time    Pain: none indicated    Current Diet : Dental soft, thins -  If any s/s of aspiration or if lung status decline emerges, then downgrade to nectar or d/c po until we recheck. Treatment:  Pt seen bedside to address the following goals:  1- The patient will tolerate recommended diet without observed clinical signs of aspiration  3/20:  Pt given oral care prior to po trials (had trouble with swish and spit). Pt alert but confused. Reassessment ordered per nursing since pt choked on crushed meds in applesauce with water rinse following earlier in the day (caregiver reported mouth was very dry prior to this). Lungs clear to diminished per nursing. Caregiver reported pt does not have problems with swallowing typically. Pt able to swallow on command, mild tongue deviation to the left with protrusion, no labial weakness. Fairly strong volitional cough. Pt able to tolerate all po trials of ice chips, water via cup and straw with a couple consecutive swallows, puree and cracker with water trials following without any s/s of aspiration (no cough/wet vocal quality/throat clearing).   Functional mastication and management of cracker trials x2 (declined any further trials). Fairly timely swallow initiation noted. Recommend continue with present diet of dental soft with thins with supervision at this time. Cont goal   2- The pt/family will demonstrate understanding of swallowing recommendations and concerns. 3/19-  The pt was educated to purpose of the visit and relationship between lung status and swallowing. Pt with questionable comprehension. con't goal   3/20:  Pt and caregiver educated to role of dysphagia, the s/s of aspiration and to stop and notify nursing should they emerge, upright with po and to not talk while eating/drinking, and that will continue with present diet of dental soft with thins. Caregiver reported that pt at baseline takes pills whole. Pt with questionable full understanding. Caregiver verbalized understanding. Cont goal    Patient/Family/Caregiver Education: see under goal 2 above  . Compensatory Strategies:  Upright as possible for all oral intake  No talking while eating  Supervised po at this time  Crush meds as needed if difficulty emerges with pills with water/applesauce       Plan:  Continued daily Dysphagia treatment with goals per  plan of care. Diet recommendations:Dental soft, thins; If any s/s of aspiration or if lung status decline emerges, then downgrade to nectar or d/c po until we recheck. DC recommendation: TBD closer to discharge  Treatment: 14  D/W nursing, Donna  Needs met prior to leaving room, call button in reach. Elijah Cason MA CCC/SLP [id number]  Pg.  # [phone]    If patient is discharged prior to next treatment, this note will serve as the discharge summary

## 2019-03-21 ENCOUNTER — APPOINTMENT (OUTPATIENT)
Dept: GENERAL RADIOLOGY | Age: 84
DRG: 189 | End: 2019-03-21
Payer: MEDICARE

## 2019-03-21 VITALS
TEMPERATURE: 97.9 F | RESPIRATION RATE: 18 BRPM | HEART RATE: 66 BPM | SYSTOLIC BLOOD PRESSURE: 156 MMHG | OXYGEN SATURATION: 95 % | WEIGHT: 114.64 LBS | HEIGHT: 60 IN | BODY MASS INDEX: 22.51 KG/M2 | DIASTOLIC BLOOD PRESSURE: 68 MMHG

## 2019-03-21 LAB
ANION GAP SERPL CALCULATED.3IONS-SCNC: 11 MMOL/L (ref 3–16)
ANISOCYTOSIS: ABNORMAL
BASOPHILS ABSOLUTE: 0 K/UL (ref 0–0.2)
BASOPHILS RELATIVE PERCENT: 0.2 %
BUN BLDV-MCNC: 17 MG/DL (ref 7–20)
CALCIUM SERPL-MCNC: 9.2 MG/DL (ref 8.3–10.6)
CHLORIDE BLD-SCNC: 97 MMOL/L (ref 99–110)
CO2: 28 MMOL/L (ref 21–32)
CREAT SERPL-MCNC: 0.6 MG/DL (ref 0.6–1.2)
EOSINOPHILS ABSOLUTE: 0 K/UL (ref 0–0.6)
EOSINOPHILS RELATIVE PERCENT: 0.2 %
GFR AFRICAN AMERICAN: >60
GFR NON-AFRICAN AMERICAN: >60
GLUCOSE BLD-MCNC: 94 MG/DL (ref 70–99)
HCT VFR BLD CALC: 36.5 % (ref 36–48)
HEMOGLOBIN: 11.5 G/DL (ref 12–16)
LYMPHOCYTES ABSOLUTE: 0.4 K/UL (ref 1–5.1)
LYMPHOCYTES RELATIVE PERCENT: 4.6 %
MCH RBC QN AUTO: 26.8 PG (ref 26–34)
MCHC RBC AUTO-ENTMCNC: 31.4 G/DL (ref 31–36)
MCV RBC AUTO: 85.4 FL (ref 80–100)
MONOCYTES ABSOLUTE: 0.7 K/UL (ref 0–1.3)
MONOCYTES RELATIVE PERCENT: 9 %
NEUTROPHILS ABSOLUTE: 7.1 K/UL (ref 1.7–7.7)
NEUTROPHILS RELATIVE PERCENT: 86 %
OVALOCYTES: ABNORMAL
PDW BLD-RTO: 21.6 % (ref 12.4–15.4)
PLATELET # BLD: 222 K/UL (ref 135–450)
PLATELET SLIDE REVIEW: ADEQUATE
PMV BLD AUTO: 8.2 FL (ref 5–10.5)
POTASSIUM REFLEX MAGNESIUM: 3.6 MMOL/L (ref 3.5–5.1)
RBC # BLD: 4.27 M/UL (ref 4–5.2)
SCHISTOCYTES: ABNORMAL
SODIUM BLD-SCNC: 136 MMOL/L (ref 136–145)
TEAR DROP CELLS: ABNORMAL
WBC # BLD: 8.3 K/UL (ref 4–11)

## 2019-03-21 PROCEDURE — 6360000002 HC RX W HCPCS: Performed by: STUDENT IN AN ORGANIZED HEALTH CARE EDUCATION/TRAINING PROGRAM

## 2019-03-21 PROCEDURE — 80048 BASIC METABOLIC PNL TOTAL CA: CPT

## 2019-03-21 PROCEDURE — 92526 ORAL FUNCTION THERAPY: CPT

## 2019-03-21 PROCEDURE — 85025 COMPLETE CBC W/AUTO DIFF WBC: CPT

## 2019-03-21 PROCEDURE — 74230 X-RAY XM SWLNG FUNCJ C+: CPT

## 2019-03-21 PROCEDURE — 2580000003 HC RX 258: Performed by: STUDENT IN AN ORGANIZED HEALTH CARE EDUCATION/TRAINING PROGRAM

## 2019-03-21 PROCEDURE — 36415 COLL VENOUS BLD VENIPUNCTURE: CPT

## 2019-03-21 PROCEDURE — 6370000000 HC RX 637 (ALT 250 FOR IP): Performed by: STUDENT IN AN ORGANIZED HEALTH CARE EDUCATION/TRAINING PROGRAM

## 2019-03-21 PROCEDURE — 92611 MOTION FLUOROSCOPY/SWALLOW: CPT

## 2019-03-21 RX ORDER — AMLODIPINE BESYLATE 10 MG/1
10 TABLET ORAL DAILY
Qty: 30 TABLET | Refills: 3 | Status: SHIPPED | OUTPATIENT
Start: 2019-03-22 | End: 2019-03-21

## 2019-03-21 RX ORDER — NITROFURANTOIN MACROCRYSTALS 100 MG/1
100 CAPSULE ORAL 2 TIMES DAILY
Status: DISCONTINUED | OUTPATIENT
Start: 2019-03-21 | End: 2019-03-21 | Stop reason: HOSPADM

## 2019-03-21 RX ORDER — AMLODIPINE BESYLATE 10 MG/1
10 TABLET ORAL DAILY
Qty: 30 TABLET | Refills: 3 | Status: SHIPPED | OUTPATIENT
Start: 2019-03-22 | End: 2019-08-09 | Stop reason: SDUPTHER

## 2019-03-21 RX ORDER — LISINOPRIL 20 MG/1
20 TABLET ORAL DAILY
Qty: 30 TABLET | Refills: 3 | Status: SHIPPED | OUTPATIENT
Start: 2019-03-22 | End: 2019-03-21 | Stop reason: HOSPADM

## 2019-03-21 RX ADMIN — FAMOTIDINE 20 MG: 20 TABLET ORAL at 10:10

## 2019-03-21 RX ADMIN — NITROFURANTOIN (MACROCRYSTALS) 100 MG: 100 CAPSULE ORAL at 10:10

## 2019-03-21 RX ADMIN — LISINOPRIL 20 MG: 20 TABLET ORAL at 10:10

## 2019-03-21 RX ADMIN — APIXABAN 2.5 MG: 5 TABLET, FILM COATED ORAL at 10:11

## 2019-03-21 RX ADMIN — ASPIRIN 81 MG: 81 TABLET ORAL at 10:11

## 2019-03-21 RX ADMIN — DEXTROSE MONOHYDRATE 1 G: 5 INJECTION INTRAVENOUS at 14:45

## 2019-03-21 RX ADMIN — Medication 12.5 MG: at 10:11

## 2019-03-21 RX ADMIN — AMLODIPINE BESYLATE 10 MG: 10 TABLET ORAL at 10:11

## 2019-03-21 RX ADMIN — Medication 10 ML: at 10:16

## 2019-03-21 RX ADMIN — CALCIUM 500 MG: 500 TABLET ORAL at 10:11

## 2019-03-21 ASSESSMENT — PAIN SCALES - PAIN ASSESSMENT IN ADVANCED DEMENTIA (PAINAD)
NEGVOCALIZATION: 0
CONSOLABILITY: 0
BREATHING: 0
BODYLANGUAGE: 0
FACIALEXPRESSION: 0
TOTALSCORE: 0

## 2019-03-21 NOTE — CARE COORDINATION
3/21/2019  [facility])  Clinical Case Management Department    Discharge Summary    Patient: Roberta Bonner  MRN: [id number] / : 1923         Admission Documentation  Attending Provider: Eliezer Sevilla MD  Admit date/time: 3/19/2019  7:13 AM  Status: Inpatient [101]  Diagnosis: Community acquired pneumonia, unspecified laterality     Readmission within last 30 days:  no     Living Situation:  Discharge Planning  Living Arrangements: Other (Comment)([facility] )  Support Systems: Family Members, Homemaker  Potential Assistance Needed: Mihai Pedraza, [address] [place], [place] [address]vd: OT, PT, Nursing Services  [facility]     Written Discharge Summary:  Patient to discharge home to [facility]. Aide staff to provide transport. They will explore possible move to assisted living upon return. SW called and notified [facility] of home care orders ([phone]). They will pull orders from [facility]. Patient agreeable to to discharge plan after declining SNF option. Service Assessment :       Values / Beliefs  Do you have any ethnic, cultural, sacramental, or spiritual Spiritism needs you would like us to be aware of while you are in the hospital?: No      Pharmacy: No medication concerns noted. Potential Assistance Purchasing Medications:     Does patient want to participate in local refill/meds to beds program?:      Notification for Mihai Pedraza placement completed in [facility]/PAS?: No     Discharge disposition: Home       Ancillary: [facility]  and her family were provided with choice of provider; she and her family are in agreement with the discharge plan.       Care Transitions patient: No    DANDRE Garza  [facility]  Case Management Department  Ph: [phone]

## 2019-03-21 NOTE — PROGRESS NOTES
D/C order noted. SL and tele d/c'd. Reviewed discharge paperwork and prescriptions with caregiver Heena Zambrano. She verbalized understanding. Pt transported off floor to private car with maximum assist x2 to get inside car. Heena Mckennamenez stated she would get assistance at the [facility] to help get her out of the car into a wheelchair. All personal belongings sent with pt.

## 2019-03-21 NOTE — DISCHARGE INSTR - COC
Diastolic dysfunction Y90.9    Anemia D64.9    Erythema of lower extremity L53.9    Left leg numbness R20.0    Community acquired pneumonia J18.9    Acute respiratory failure with hypoxia (HCC) J96.01    Hypertensive urgency I16.0    Anticoagulated Z79.01    Fall at home [address] Lauren Nolen, Y92.009    Nonrheumatic mitral valve regurgitation I34.0    Pneumonia due to organism J18.9    Hypoxia R09.02    Confusion R41.0    Chronic diastolic heart failure (HCC) I50.32       Isolation/Infection:   Isolation          No Isolation            Nurse Assessment:  Last Vital Signs: BP (!) 156/68   Pulse 66   Temp 97.9 °F (36.6 °C) (Oral)   Resp 18   Ht 5' (1.524 m)   Wt 114 lb 10.2 oz (52 kg)   SpO2 95%   BMI 22.39 kg/m²     Last documented pain score (0-10 scale): Pain Level: 5  Last Weight:   Wt Readings from Last 1 Encounters:   19 114 lb 10.2 oz (52 kg)     Mental Status:  {IP PT MENTAL STATUS:93368}    IV Access:  { ELISHA IV ACCESS:074117543}    Nursing Mobility/ADLs:  Walking   {P DME JLLO:500452876}  Transfer  {P DME PCER:752555973}  Bathing  {P DME DKTH:729748378}  Dressing  {P DME FHBL:393467933}  Toileting  {P DME KROB:475739902}  Feeding  {[facility] DME HOHY:591573009}  Med Admin  {[facility] DME DRQ}  Med Delivery   { ELISHA MED Delivery:079362931}    Wound Care Documentation and Therapy:        Elimination:  Continence:   · Bowel: {YES / UQ:54156}  · Bladder: {YES / IU:62081}  Urinary Catheter: {Urinary Catheter:333594952}   Colostomy/Ileostomy/Ileal Conduit: {YES / PK:70607}       Date of Last BM: ***    Intake/Output Summary (Last 24 hours) at 3/21/2019 1551  Last data filed at 3/21/2019 0428  Gross per 24 hour   Intake 0 ml   Output --   Net 0 ml     No intake/output data recorded.     Safety Concerns:     Pop Purvis ELISHA Safety Concerns:962279270}    Impairments/Disabilities:      [address] Impairments/Disabilities:251491945}    Nutrition Therapy:  Current Nutrition Therapy:   [address] TE[address] Diet

## 2019-03-21 NOTE — PROCEDURES
airway    Recommended Diet:  Solid consistency: Dental Soft  Liquid consistency: Thin  Liquid administration via: Cup;Straw  Medication administration: (as tolerated)    Safe Swallow Protocol:  Supervision: Distant  Compensatory Swallowing Strategies: Upright as possible for all oral intake    Recommendations/Treatment  Requires SLP Intervention: No  D/C Recommendations: (no follow up indicated)    Recommended Exercises:    Therapeutic Interventions: Patient/Family education;Oral care    Education: Images and recommendations were reviewed with pt following this exam.   Patient Education: pt educated to results of MBS  Patient Education Response: Verbalizes understanding    Prognosis  Prognosis for safe diet advancement: good  Duration/Frequency of Treatment  Duration/Frequency of Treatment: no follow up indicated      Goals:    1- The patient will tolerate recommended diet without observed clinical signs of aspiration  3/20:  Pt given oral care prior to po trials (had trouble with swish and spit). Pt alert but confused. Reassessment ordered per nursing since pt choked on crushed meds in applesauce with water rinse following earlier in the day (caregiver reported mouth was very dry prior to this). Lungs clear to diminished per nursing. Caregiver reported pt does not have problems with swallowing typically. Pt able to swallow on command, mild tongue deviation to the left with protrusion, no labial weakness. Fairly strong volitional cough. Pt able to tolerate all po trials of ice chips, water via cup and straw with a couple consecutive swallows, puree and cracker with water trials following without any s/s of aspiration (no cough/wet vocal quality/throat clearing). Functional mastication and management of cracker trials x2 (declined any further trials). Fairly timely swallow initiation noted. Recommend continue with present diet of dental soft with thins with supervision at this time.   Cont goal   3/21: pt analyzed pancakes, rosita crackers, and thin liquids. Pt demonstrated effective mastication with solids, no oral residue, would not take much. Pt consumed thin liquids via cup/straw with no overt signs of aspiration. As CXR and MD notes stating pt with possible aspiration pneumonia on admit, will proceed with MBS to fully assess swallow function  Cont goal as appropriate       2- The pt/family will demonstrate understanding of swallowing recommendations and concerns. 3/19-  The pt was educated to purpose of the visit and relationship between lung status and swallowing. Pt with questionable comprehension. con't goal   3/20:  Pt and caregiver educated to role of dysphagia, the s/s of aspiration and to stop and notify nursing should they emerge, upright with po and to not talk while eating/drinking, and that will continue with present diet of dental soft with thins. Caregiver reported that pt at baseline takes pills whole. Pt with questionable full understanding. Caregiver verbalized understanding. Cont goal  3/21: pt educated to recommendations for MBS and rationale. Pt with questionable full comprehension  Cont goal  3/21: pt educated to results of MBS study. Pt stated understanding  Goal met    New goal:  3- Pt will participate in [facility]  3/21: goal met       Oral Preparation / Oral Phase  WFL for soft solids     Pharyngeal Phase  [facility]    Esophageal Phase  Not assessed    Pain   Patient Currently in Pain: Denies    Therapy Time:   Individual Concurrent Group Co-treatment   Time In 0578         Time Out 1300         Minutes 25            Plan;  Recommended diet: cont dental soft/thin liquids  Dc: no follow up indicated. Radha Davis M.S./[facility]-SLP #[id number]  Pg.  # [phone]  Needs met prior to leaving radiology,  d/w RN April   3/21/2019, 1:17 PM

## 2019-03-21 NOTE — CONSULTS
3/21/2019  [facility])  Clinical Case Management Department    Initial Assessment     Patient: Keith Lock  MRN: [id number] / : 1923          Admission Documentation  Attending Provider: Adán Salgado MD  Admit date/time: 3/19/2019  7:13 AM  Status: Inpatient [101]  Diagnosis: Community acquired pneumonia, unspecified laterality     Readmission within last 30 days:  no     Living Situation:  Discharge Planning  Living Arrangements: Other (Comment)([facility] )  Support Systems: Family Members, Homemaker  Potential Assistance Needed: [address]: OT, PT, Nursing Services  Patient expects to be discharged to[de-identified] [facility])     Written Assessment:   BROOKLYN Mccarty is a 89 y. o. female with Afib on eliquis, HTN, Valvular heart disease (Mitral regurg),   who presents with a complaint of a some confusion and a fall two days ago. The patient suffered a mechanical fall at her nursing facility on . She was unable to provide a detailed history due to confusion so most history was obtaining from the ED and nursing facility. She requires 24 hour assistance with a caregiver. She has been having increasing difficulty doing task over last few days. SW spoke with patient at bedside. SW introduced self and role as part of the team. Patient resides at [facility] ([employer] [facility] in independent living. She has three walkers in her apartment. She has an aide present every day during the day time and daughter was staying at night. Patient was seen by therapy and is against Skilled placement despite SW discussion benefits and concerns for safety with return home. Patient was agreeable to home health care services upon return. SW provided contact information to patient/family and placed information on white board in room should needs arise. No other needs noted at this time. LUÍS placed call to Madelin at [facility].  She reported patient's aides are fantastic and they can provide almost 24 hour care for support. She stated they work with [facility] on site and orders can be faxed to them regarding home care needs. She stated her aides normally handle transportation back to [facility]. SW to follow up with patient at bedside. LUÍS Paged resident on team regarding need for orders. LUÍS will fax them to [employer] for completion. Service Assessment:       Values / Beliefs  Do you have any ethnic, cultural, sacramental, or spiritual Lutheran needs you would like us to be aware of while you are in the hospital?: No       Destination:  [facility]:   [address] [place] [address]/Skilled Nursing Prior to Admission:  Home care at home? No      Home Medical Care Services:  PT,OT, SN are needed      Therapy Consults  PT evaluation needed?: Yes (Comment)  OT Evalulation Needed?: Yes (Comment)      Pharmacy: No medication concerns noted.     Potential Assistance Purchasing Medications:     Does patient want to participate in local refill/meds to beds program?:      Goals of Care:  Patient expects to be discharged to[de-identified] [facility])   Patient plans for SNF/Placement: DECLINED          Mode of transport from hospital: Aides      Barriers to discharge: Home care orders, transportation      DANDRE Guerrero  [facility]  Case Management Department  Ph: [phone]

## 2019-03-21 NOTE — CARE COORDINATION
[address] Transitions Interview     2019    Patient: Valerio Pope  Patient : 1923   MRN: [id number]    Reason for Admission: PNA        RARS: Readmission Risk Score: 15         Spoke with: Valerio Pope and Caregiver Alvina      Readmission Risk  Patient Active Problem List   Diagnosis    Essential hypertension    Macular degeneration of both eyes    CAD (coronary artery disease)    Hyperlipidemia    Hiatal hernia    Depressive disorder    Osteoporosis    Scoliosis    Memory difficulties    Trigger finger of left hand    NSTEMI (non-ST elevated myocardial infarction) ([address]    Sinus bradycardia    Preventative health care    Nonrheumatic aortic valve insufficiency    Paroxysmal atrial fibrillation (HCC)    Heart murmur    Diastolic dysfunction    Anemia    Erythema of lower extremity    Left leg numbness    Community acquired pneumonia    Acute respiratory failure with hypoxia ([address]    Hypertensive urgency    Anticoagulated    Fall at home    Nonrheumatic mitral valve regurgitation    Pneumonia due to organism    Hypoxia    Confusion    Chronic diastolic heart failure [facility])       Inpatient Assessment  Care Transitions Summary    Care Transitions Inpatient Review  Medication Review  Barriers to Medication Adherence:  None  Are you able to afford your medications?:  Yes  How often do you have difficulty taking your medications?:  I always take them as prescribed.   Housing Review  Who do you live with?:  Alone, Other Caregiver  Are you an active caregiver in your home?:  No  Social Support  Do you have a ?:  No  Do you have a [address] [place] [address], [facility]?:  No  Durable Medical Equipment  Patient DME:  [place] F[employer] lift, Shower chair, Other  Other Patient DME:  [facility]  Functional Review  Ability to seek help/take action for Emergent/Urgent situations i.e. fire, crime, inclement weather or health crisis.:  Needs Assistance  Ability handle personal hygiene needs (bathing/dressing/grooming):  Needs Assistance  Ability to manage medications:  Needs Assistance  Ability to prepare food:  Needs Assistance  Ability to maintain home (clean home, laundry):  Dependent  Ability to drive and/or has transportation:  Dependent  Ability to do shopping:  Dependent  Ability to manage finances:  Needs Assistance  Is patient able to live independently?:  No  Hearing and Vision  Visual Impairment:  None  Hearing Impairment:  None  Care Transitions Interventions  No Identified Needs       Summary  CTC spoke with patient's Caregiver Ginger Harrison at bedside this afternoon. Caregiver reports patient lives in [address] alone, home has no stairs with elevators. Patient has both Ginger Harrison and Edward Macario, who are Private Duty Caregivers in home during the day, patient is in home alone at night. Patient uses a walker, is able to perform some of her ADL's and IADL's independently, but needs lot's of assistance and supervision. Caregiver reports that family and patient are planning for patient to discharge to a SNF first, then return to Independently Living facility once more stable. CTC spoke with caregiver regarding ongoing follow up CTC calls once discharged, agreeable at this time. Patient was not given [facility] predict tool letter, as plan changed and patient is discharging home with continued 24 hour caregivers and [place] [phone] services. [facility] predict tool recommending SNF placement. Follow Up  Future Appointments   Date Time Provider Department Center   6/10/2019  2:30 PM Richardson Tovar  [address] Maintenance  There are no preventive care reminders to display for this patient.     Paulino Bermeo RN

## 2019-03-21 NOTE — PROGRESS NOTES
Pt made a statement about cutting her throat , but dines plan stated I could not do that to my kids notified Dr Gallo Han staff at bedside until resident able to eval and pt caregiver at bedside.

## 2019-03-21 NOTE — PROGRESS NOTES
Speech Language Pathology  Facility/Department: [address]  Dysphagia Daily Treatment Note    NAME: Christy Jimenez  : 1923  MRN: [id number]    Patient Diagnosis(es):   Patient Active Problem List    Diagnosis Date Noted    Community acquired pneumonia 2019    Acute respiratory failure with hypoxia ([address] 2019    Hypertensive urgency 2019    Anticoagulated 2019    Fall at home 2019    Nonrheumatic mitral valve regurgitation 2019    Pneumonia due to organism     Hypoxia     Confusion     Chronic diastolic heart failure ([address]     Left leg numbness 2019    Erythema of lower extremity 10/29/2018    Anemia 2018    Heart murmur 9695    Diastolic dysfunction     Paroxysmal atrial fibrillation ([address]     Preventative health care 2018    Nonrheumatic aortic valve insufficiency 2018    NSTEMI (non-ST elevated myocardial infarction) ([address] 2018    Sinus bradycardia     Trigger finger of left hand 2013    Osteoporosis 2013    Scoliosis 2013    Memory difficulties 2013    Depressive disorder 2013    Macular degeneration of both eyes     CAD (coronary artery disease)     Hyperlipidemia     Hiatal hernia     Essential hypertension 2011     Allergies: Allergies   Allergen Reactions    Pcn [Penicillins]      Patient tolerates Keflex    Sulfa Antibiotics          CXR (3/19/19)-  Recent Chest Xray 3/19/19  Interval progression of the left infrahilar/basilar airspace opacity may be secondary to atelectasis/infiltrate/aspiration. Previous MBS: none    Chart reviewed. Medical Diagnosis: Community acquired  Pneumonia  Treatment Diagnosis: Dysphagia    BSE Impression (3/19/19)  Pt seen in the ED after being admitted following a fall. Pt alert, cooperative with periods of confusion.  Oral- Pt with only front 4 upper teeth- partial plate is not present- and has lower teeth. Dentition is in poor health- there is a dried film on front teeth and tongue and lips are dry. Mastication with ice chips and cracker was slow but functional. Pt required frequent cues to not talk while food was in her mouth. Pt with no anterior spillage of any consistency. Pharyngeal- Pt demonstrated no s/s aspiration with any consistency presented. Pt able to initiate swallow in a timely manner with good laryngeal elevation. Vocal quality remained clear throughout. No coughing, throat clearing or choking observed with any consistency. Pt unable to follow directions to complete 3oz water test.   Recommend downgrade diet to dental soft given lack of upper partial denture. [facility] scheduled this date, 3/21    Pain: none indicated    Current Diet : Dental soft, thins -  If any s/s of aspiration or if lung status decline emerges, then downgrade to nectar or d/c po until we recheck. Treatment:  Pt seen bedside to address the following goals:  1- The patient will tolerate recommended diet without observed clinical signs of aspiration  3/20:  Pt given oral care prior to po trials (had trouble with swish and spit). Pt alert but confused. Reassessment ordered per nursing since pt choked on crushed meds in applesauce with water rinse following earlier in the day (caregiver reported mouth was very dry prior to this). Lungs clear to diminished per nursing. Caregiver reported pt does not have problems with swallowing typically. Pt able to swallow on command, mild tongue deviation to the left with protrusion, no labial weakness. Fairly strong volitional cough. Pt able to tolerate all po trials of ice chips, water via cup and straw with a couple consecutive swallows, puree and cracker with water trials following without any s/s of aspiration (no cough/wet vocal quality/throat clearing). Functional mastication and management of cracker trials x2 (declined any further trials).   Fairly timely swallow initiation noted. Recommend continue with present diet of dental soft with thins with supervision at this time. Cont goal   3/21: pt analyzed pancakes, rosita crackers, and thin liquids. Pt demonstrated effective mastication with solids, no oral residue, would not take much. Pt consumed thin liquids via cup/straw with no overt signs of aspiration. As CXR and MD notes stating pt with possible aspiration pneumonia on admit, will proceed with MBS to fully assess swallow function  Cont goal as appropriate    2- The pt/family will demonstrate understanding of swallowing recommendations and concerns. 3/19-  The pt was educated to purpose of the visit and relationship between lung status and swallowing. Pt with questionable comprehension. con't goal   3/20:  Pt and caregiver educated to role of dysphagia, the s/s of aspiration and to stop and notify nursing should they emerge, upright with po and to not talk while eating/drinking, and that will continue with present diet of dental soft with thins. Caregiver reported that pt at baseline takes pills whole. Pt with questionable full understanding. Caregiver verbalized understanding. Cont goal  3/21: pt educated to recommendations for MBS and rationale. Pt with questionable full comprehension  Cont goal    Patient/Family/Caregiver Education:   see under goal 2 above    Compensatory Strategies:  Upright as possible for all oral intake  No talking while eating  Supervised po at this time  Crush meds as needed if difficulty emerges with pills with water/applesauce     Plan:  Continued daily Dysphagia treatment with goals per  plan of care. MBS today, due to admit with possible aspiration pneumonia per CXR and MD notes  Diet recommendations:  TBD after MBS  DC recommendation: TBD after MBS  Treatment: 15  D/W nursing, April  Needs met prior to leaving room, call button in reach. Pedro Jenkins M.S./[facility]-SL[facility] #[id number]  Pg.  # [id number]    If patient is discharged prior to next treatment, this note will serve as the discharge summary

## 2019-03-21 NOTE — DISCHARGE SUMMARY
Hospital Medicine Discharge Summary    Patient ID: Sherren Platts   Gender: female  : 1923   Age: 80 y.o. MRN: [id number]  Code Status: Limited    Patient's PCP: Mike Lang MD    Admit Date: 3/19/2019     Discharge Date:   3/21/2019    Admitting Physician: Gia Rosales MD     Discharge Physician: Bernice Rojas MD     Discharge Diagnoses: Active Hospital Problems    Diagnosis Date Noted    Community acquired pneumonia [J18.9] 2019    Acute respiratory failure with hypoxia ([address] [J96.01] 2019    Hypertensive urgency [I16.0] 2019    Anticoagulated [Z79.01] 2019    Fall at home [N38. Radha Madelaine, H07.747] 2019    Pneumonia due to organism [J18.9]     Hypoxia [R09.02]     Confusion [R41.0]     Chronic diastolic heart failure (HCC) [I50.32]     Paroxysmal atrial fibrillation (HCC) [I48.0]     Sinus bradycardia [R00.1]     Essential hypertension [I10] 2011       The patient was seen and examined on day of discharge and this discharge summary is in conjunction with any daily progress note from day of discharge. Hospital Course: Meg Mccarty is a 80 y. o. female with Afib on eliquis, HTN, Valvular heart disease (Mitral regurg),   who presents with a complaint of a some confusion and a fall two days ago. The patient suffered a mechanical fall at her nursing facility on . She was unable to provide a detailed history due to confusion so most history was obtaining from the ED and nursing facility. She requires 24 hour assistance with a caregiver.  She has been having increasing difficulty doing task over last few days.  he patient daughter states she checked her blood pressure was markedly elevated and the patient complains of pain in her elbow and blood pressure cuff was squeezing her elbow.  Is no report of trauma to the elbow. Sulema Leventhal is no head trauma associated with a fall onto her knee on .  It is been no fever, chills, and no redness of the knee. There was no history of any cough, sputum production. She was admitted for management of acute encephalopathy. She was originally started for treatment of a suspected pneumonia. This was eventually ruled out with a CT scan which showed 0.8 cm noncalcified pulmonary nodule within right upper lobe. Follow-up chest CT should be performed at C6-12 months. Unclear Urine because antibiotics  Was given before UA could be collected. She was given hydration and significantly improved. She was given three days of ceftriaxone for UTI coverage. Social work met with the patient to determine discharge planning.  SW spoke with patient at bedside. SW introduced self and role as part of the team. Patient resides at [facility] in independent living. She has three walkers in her apartment. She has an aide present every day during the day time and daughter was staying at night. Patient was seen by therapy and is against Skilled placement despite SW discussion benefits and concerns for safety with return home. Patient was agreeable to home health care services upon return. Disposition:  Home with Home Health Care    Physical Exam Performed:     BP (!) 156/68   Pulse 66   Temp 97.9 °F (36.6 °C) (Oral)   Resp 18   Ht 5' (1.524 m)   Wt 114 lb 10.2 oz (52 kg)   SpO2 95%   BMI 22.39 kg/m²       General appearance:  No apparent distress, appears stated age and cooperative. HEENT:  Normal cephalic, atraumatic without obvious deformity. Pupils equal, round, and reactive to light. Extra ocular muscles intact. Conjunctivae/corneas clear. Neck: Supple, with full range of motion. No jugular venous distention. Trachea midline. Respiratory:  Normal respiratory effort. Clear to auscultation, bilaterally without Rales/Wheezes/Rhonchi. Cardiovascular:  Regular rate and rhythm with normal S1/S2 without murmurs, rubs or gallops. Abdomen: Soft, non-tender, non-distended with normal bowel sounds.   Musculoskeletal:  No clubbing, cyanosis or edema bilaterally. Full range of motion without deformity. Skin: Skin color, texture, turgor normal.  No rashes or lesions. Neurologic:  Neurovascularly intact without any focal sensory/motor deficits. Cranial nerves: II-XII intact, grossly non-focal.  Psychiatric:  Alert and oriented, thought content appropriate, normal insight  Capillary Refill: Brisk,< 3 seconds   Peripheral Pulses: +2 palpable, equal bilaterally       Labs: For convenience and continuity at follow-up the following most recent labs are provided:      CBC:    Lab Results   Component Value Date    WBC 8.3 03/21/2019    HGB 11.5 03/21/2019    HCT 36.5 03/21/2019     03/21/2019       Renal:    Lab Results   Component Value Date     03/21/2019    K 3.6 03/21/2019    CL 97 03/21/2019    CO2 28 03/21/2019    BUN 17 03/21/2019    CREATININE 0.6 03/21/2019    CALCIUM 9.2 03/21/2019         Significant Diagnostic Studies    Radiology:   FL MODIFIED BARIUM SWALLOW W VIDEO   Final Result      Normal modified barium swallow. Please refer to the Speech Pathologist report for dietary recommendations. CT CHEST WO CONTRAST   Final Result      1. Moderate elevation of left hemidiaphragm, unchanged. 2. Mild lingular and left lower lobe atelectasis. 3. 0.8 cm noncalcified pulmonary nodule within right upper lobe. Follow-up chest CT should be performed at C6-12 months. 4. Scattered arterial calcifications. CT HEAD WO CONTRAST   Final Result      1. NO ACUTE INTRACRANIAL ABNORMALITY. XR CHEST PORTABLE   Final Result    Interval progression of the left infrahilar/basilar airspace opacity may be secondary to atelectasis/infiltrate/aspiration. XR KNEE LEFT (1-2 VIEWS)   Final Result   Severe tricompartmental osteoarthritis with joint effusion. Chondrocalcinosis.              Consults:     IP CONSULT TO HOSPITALIST  IP CONSULT TO SOCIAL WORK  IP CONSULT TO HOME CARE NEEDS    Disposition:  Home with [facility]      Condition at Discharge: Stable    Discharge Instructions/Follow-up:  Follow up with PCP     Code Status:  Limited     Activity: activity as tolerated    Diet: regular diet      Discharge Medications:     Current Discharge Medication List           Details   lisinopril (PRINIVIL;ZESTRIL) 20 MG tablet Take 1 tablet by mouth daily  Qty: 30 tablet, Refills: 3      amLODIPine (NORVASC) 10 MG tablet Take 1 tablet by mouth daily  Qty: 30 tablet, Refills: 3              Details   !! metoprolol tartrate (LOPRESSOR) 25 MG tablet Take 0.5 tablets by mouth 2 times daily  Qty: 60 tablet, Refills: 3       !! - Potential duplicate medications found. Please discuss with provider. Details   traMADol (ULTRAM) 50 MG tablet Take 50 mg by mouth every 6 hours as needed for Pain. ferrous sulfate 325 (65 Fe) MG tablet Take 325 mg by mouth 2 times daily (with meals)      donepezil (ARICEPT) 10 MG tablet TAKE 1 TABLET BY MOUTH NIGHTLY  Qty: 30 tablet, Refills: 2      !! metoprolol tartrate (LOPRESSOR) 25 MG tablet TAKE 1 TABLET BY MOUTH 2 TIMES DAILY. THIS REPLACES THE ATENOLOL. Qty: 60 tablet, Refills: 3    Associated Diagnoses: NSTEMI (non-ST elevated myocardial infarction) ([facility])      ELIQUIS 2.5 MG TABS tablet TAKE 1 TABLET BY MOUTH TWICE DAILY  Qty: 60 tablet, Refills: 4      simvastatin (ZOCOR) 20 MG tablet 1 TABLET BY MOUTH AT BEDTIME * NO GRAPEFRUIT *  Qty: 30 tablet, Refills: 4      famotidine (PEPCID) 20 MG tablet Take 1 tablet by mouth daily  Qty: 90 tablet, Refills: 3      calcium carbonate 600 MG TABS tablet Take 1 tablet by mouth daily      benazepril (LOTENSIN) 10 MG tablet Take 0.5 tablets by mouth daily  Qty: 90 tablet, Refills: 4      aspirin 81 MG EC tablet Take 81 mg by mouth daily. !! - Potential duplicate medications found. Please discuss with provider.           Time Spent on discharge is more than 15 minutes in the examination, evaluation, counseling and review of medications and discharge plan. Signed:    Vitor Rico MD   3/21/2019      Thank you Mary Arreaga MD for the opportunity to be involved in this patient's care.    \

## 2019-03-21 NOTE — PLAN OF CARE
Problem: Falls - Risk of:  Goal: Will remain free from falls  Description  Will remain free from falls  3/20/2019 2301 by Corky Mcmullen RN  Outcome: Ongoing  Note:   . Pt is a fall risk. Fall risk protocol in place. See Elmon Law Fall Score. Pt bed is in low position, bed alarm is on, side rails up, fall risk bracelet applied. , non-skid footwear in use. Camera in use. Will continue with hourly rounds for po intake, pain needs, toileting and repositioning as needed. Will continue to monitor for needs. Corky Mcmullen      3/20/2019 2301 by Corky Mcmullen RN

## 2019-03-22 ENCOUNTER — CARE COORDINATION (OUTPATIENT)
Dept: CASE MANAGEMENT | Age: 84
End: 2019-03-22

## 2019-03-22 ENCOUNTER — APPOINTMENT (OUTPATIENT)
Dept: GENERAL RADIOLOGY | Age: 84
End: 2019-03-22
Payer: MEDICARE

## 2019-03-22 ENCOUNTER — HOSPITAL ENCOUNTER (EMERGENCY)
Age: 84
Discharge: HOME | End: 2019-03-22
Attending: EMERGENCY MEDICINE
Payer: MEDICARE

## 2019-03-22 VITALS
TEMPERATURE: 98.2 F | HEART RATE: 65 BPM | OXYGEN SATURATION: 99 % | RESPIRATION RATE: 16 BRPM | DIASTOLIC BLOOD PRESSURE: 80 MMHG | SYSTOLIC BLOOD PRESSURE: 140 MMHG

## 2019-03-22 DIAGNOSIS — R53.83 FATIGUE, UNSPECIFIED TYPE: ICD-10-CM

## 2019-03-22 DIAGNOSIS — M25.562 ACUTE PAIN OF LEFT KNEE: Primary | ICD-10-CM

## 2019-03-22 DIAGNOSIS — I50.9 CONGESTIVE HEART FAILURE, UNSPECIFIED HF CHRONICITY, UNSPECIFIED HEART FAILURE TYPE (HCC): Primary | ICD-10-CM

## 2019-03-22 LAB
ANION GAP SERPL CALCULATED.3IONS-SCNC: 11 MMOL/L (ref 3–16)
ANISOCYTOSIS: ABNORMAL
BASOPHILS ABSOLUTE: 0 K/UL (ref 0–0.2)
BASOPHILS RELATIVE PERCENT: 0.3 %
BUN BLDV-MCNC: 19 MG/DL (ref 7–20)
CALCIUM SERPL-MCNC: 9 MG/DL (ref 8.3–10.6)
CHLORIDE BLD-SCNC: 96 MMOL/L (ref 99–110)
CO2: 30 MMOL/L (ref 21–32)
CREAT SERPL-MCNC: 0.8 MG/DL (ref 0.6–1.2)
CRENATED RBC'S: ABNORMAL
EOSINOPHILS ABSOLUTE: 0 K/UL (ref 0–0.6)
EOSINOPHILS RELATIVE PERCENT: 0.4 %
GFR AFRICAN AMERICAN: >60
GFR NON-AFRICAN AMERICAN: >60
GLUCOSE BLD-MCNC: 87 MG/DL (ref 70–99)
HCT VFR BLD CALC: 36.9 % (ref 36–48)
HEMOGLOBIN: 12 G/DL (ref 12–16)
LYMPHOCYTES ABSOLUTE: 0.3 K/UL (ref 1–5.1)
LYMPHOCYTES RELATIVE PERCENT: 2.8 %
MCH RBC QN AUTO: 27.3 PG (ref 26–34)
MCHC RBC AUTO-ENTMCNC: 32.4 G/DL (ref 31–36)
MCV RBC AUTO: 84.2 FL (ref 80–100)
MONOCYTES ABSOLUTE: 0.8 K/UL (ref 0–1.3)
MONOCYTES RELATIVE PERCENT: 7.7 %
NEUTROPHILS ABSOLUTE: 9.4 K/UL (ref 1.7–7.7)
NEUTROPHILS RELATIVE PERCENT: 88.8 %
OVALOCYTES: ABNORMAL
PDW BLD-RTO: 21.9 % (ref 12.4–15.4)
PLATELET # BLD: 301 K/UL (ref 135–450)
PMV BLD AUTO: 7.8 FL (ref 5–10.5)
POTASSIUM REFLEX MAGNESIUM: 3.6 MMOL/L (ref 3.5–5.1)
RBC # BLD: 4.39 M/UL (ref 4–5.2)
SLIDE REVIEW: ABNORMAL
SODIUM BLD-SCNC: 137 MMOL/L (ref 136–145)
TROPONIN: <0.01 NG/ML
WBC # BLD: 10.5 K/UL (ref 4–11)

## 2019-03-22 PROCEDURE — 85025 COMPLETE CBC W/AUTO DIFF WBC: CPT

## 2019-03-22 PROCEDURE — 1111F DSCHRG MED/CURRENT MED MERGE: CPT | Performed by: INTERNAL MEDICINE

## 2019-03-22 PROCEDURE — 99284 EMERGENCY DEPT VISIT MOD MDM: CPT

## 2019-03-22 PROCEDURE — 72170 X-RAY EXAM OF PELVIS: CPT

## 2019-03-22 PROCEDURE — 84484 ASSAY OF TROPONIN QUANT: CPT

## 2019-03-22 PROCEDURE — 73562 X-RAY EXAM OF KNEE 3: CPT

## 2019-03-22 PROCEDURE — 80048 BASIC METABOLIC PNL TOTAL CA: CPT

## 2019-03-22 ASSESSMENT — ENCOUNTER SYMPTOMS
SHORTNESS OF BREATH: 0
VOMITING: 0
ABDOMINAL PAIN: 0

## 2019-03-26 ENCOUNTER — CARE COORDINATION (OUTPATIENT)
Dept: CASE MANAGEMENT | Age: 84
End: 2019-03-26

## 2019-03-29 RX ORDER — SIMVASTATIN 20 MG
TABLET ORAL
Qty: 30 TABLET | Refills: 2 | Status: SHIPPED | OUTPATIENT
Start: 2019-03-29 | End: 2019-07-27 | Stop reason: SDUPTHER

## 2019-04-01 ENCOUNTER — OFFICE VISIT (OUTPATIENT)
Dept: INTERNAL MEDICINE CLINIC | Age: 84
End: 2019-04-01
Payer: MEDICARE

## 2019-04-01 VITALS
OXYGEN SATURATION: 95 % | SYSTOLIC BLOOD PRESSURE: 110 MMHG | HEART RATE: 85 BPM | DIASTOLIC BLOOD PRESSURE: 60 MMHG | BODY MASS INDEX: 22.39 KG/M2 | HEIGHT: 60 IN

## 2019-04-01 DIAGNOSIS — I50.32 CHRONIC DIASTOLIC HEART FAILURE (HCC): ICD-10-CM

## 2019-04-01 DIAGNOSIS — R91.1 PULMONARY NODULE, RIGHT: ICD-10-CM

## 2019-04-01 DIAGNOSIS — I10 ESSENTIAL HYPERTENSION: ICD-10-CM

## 2019-04-01 DIAGNOSIS — Z09 HOSPITAL DISCHARGE FOLLOW-UP: ICD-10-CM

## 2019-04-01 PROCEDURE — 1111F DSCHRG MED/CURRENT MED MERGE: CPT | Performed by: INTERNAL MEDICINE

## 2019-04-01 PROCEDURE — G8598 ASA/ANTIPLAT THER USED: HCPCS | Performed by: INTERNAL MEDICINE

## 2019-04-01 PROCEDURE — G8427 DOCREV CUR MEDS BY ELIG CLIN: HCPCS | Performed by: INTERNAL MEDICINE

## 2019-04-01 PROCEDURE — 1036F TOBACCO NON-USER: CPT | Performed by: INTERNAL MEDICINE

## 2019-04-01 PROCEDURE — 1090F PRES/ABSN URINE INCON ASSESS: CPT | Performed by: INTERNAL MEDICINE

## 2019-04-01 PROCEDURE — 4040F PNEUMOC VAC/ADMIN/RCVD: CPT | Performed by: INTERNAL MEDICINE

## 2019-04-01 PROCEDURE — 1123F ACP DISCUSS/DSCN MKR DOCD: CPT | Performed by: INTERNAL MEDICINE

## 2019-04-01 PROCEDURE — G8420 CALC BMI NORM PARAMETERS: HCPCS | Performed by: INTERNAL MEDICINE

## 2019-04-01 PROCEDURE — 99214 OFFICE O/P EST MOD 30 MIN: CPT | Performed by: INTERNAL MEDICINE

## 2019-04-01 NOTE — PROGRESS NOTES
the left side. Skin: No rash noted. She is not diaphoretic. Psychiatric: Her speech is normal. Her mood appears not anxious. She is not aggressive, not slowed, not withdrawn and not actively hallucinating. Thought content is not delusional. Cognition and memory are impaired. She does not exhibit a depressed mood. ASSESSMENT:       Encounter Diagnoses   Name Primary?  Chronic diastolic heart failure [facility])    [facility] discharge follow-up     Essential hypertension     Pulmonary nodule, right        Hospital discharge follow-up  Patient recently in hospital for community-acquired pneumonia, possible UTI and acute encephalopathy. She was treated with IV antibiotics. Hospital notes reviewed. She subsequently went back to the ER ×2 for generalized weakness and some confusion. She now has 24 7 care at home. Essential hypertension  BP okay    Pulmonary nodule, right  8 mm noncalcified right upper lobe. Recheck 1 year health permitting. PLAN:See ASSESSMENT for evaluation & PLAN     No orders of the defined types were placed in this encounter. PSH, PMH, SH and FH reviewed and noted. Recent and past labs, tests and consultsalso reviewed. Recent or new meds also reviewed.

## 2019-04-04 ENCOUNTER — CARE COORDINATION (OUTPATIENT)
Dept: CASE MANAGEMENT | Age: 84
End: 2019-04-04

## 2019-04-04 NOTE — CARE COORDINATION
[facility] Transitions Follow Up Call    2019    Patient: Roberta Bonner  Patient : 1923   MRN: [id number]   Reason for Admission: PNA  Discharge Date: 3/22/19 RARS: Readmission Risk Score: 0         Spoke with: Telma Candelariaradha Lin Transitions Subsequent and Final Call    Schedule Follow Up Appointment with PCP:  Declined  Subsequent and Final Calls  Do you have any ongoing symptoms?:  No  Have your medications changed?:  No  Do you have any questions related to your medications?:  No  Do you currently have any active services?:  Yes  Are you currently active with any services?:  Home Health  Do you have any needs or concerns that I can assist you with?:  No  Identified Barriers:  None  Care Transitions Interventions  No Identified Needs  Other Interventions:          Summary  CTC spoke with patient this am for final follow up CTC call. Patient states she is doing well, denies any complaints of nausea, vomiting, fevers, chills, SOB or Cough. Patient was seen by PCP on yesterday, no new or changed medications since last CTC call. Patient has Caregiver that is in home , is also still being followed by [facility] , in home at time of CTC call. Patient with no issues or concerns at this time, CTC encouraged patient to continue to drink plenty of fluids, get plenty of rest, and eat at least 3 meals per day. CTC advised Pt of use of urgent care or physicians 24 hr access line if assistance is needed after hours or the CTC provided education on s/s that require medical attention and when to seek medical attention. CTC weekend.      Follow Up  Future Appointments   Date Time Provider Department Center   6/10/2019  2:30 PM MD Fitz Braga RN

## 2019-04-07 PROBLEM — Z00.00 PREVENTATIVE HEALTH CARE: Status: RESOLVED | Noted: 2018-03-06 | Resolved: 2019-04-07

## 2019-04-09 ENCOUNTER — TELEPHONE (OUTPATIENT)
Dept: CASE MANAGEMENT | Age: 84
End: 2019-04-09

## 2019-04-15 ENCOUNTER — CARE COORDINATION (OUTPATIENT)
Dept: CARE COORDINATION | Age: 84
End: 2019-04-15

## 2019-04-15 NOTE — CARE COORDINATION
The pt declined Care Coordination. The pt has an Aide 24/7 and has been active with [facility].

## 2019-05-01 PROBLEM — Z09 HOSPITAL DISCHARGE FOLLOW-UP: Status: RESOLVED | Noted: 2018-07-27 | Resolved: 2019-05-01

## 2019-05-30 RX ORDER — FAMOTIDINE 20 MG/1
20 TABLET, FILM COATED ORAL DAILY
Qty: 90 TABLET | Refills: 2 | Status: SHIPPED | OUTPATIENT
Start: 2019-05-30

## 2019-05-30 RX ORDER — DONEPEZIL HYDROCHLORIDE 10 MG/1
10 TABLET, FILM COATED ORAL NIGHTLY
Qty: 30 TABLET | Refills: 2 | Status: SHIPPED | OUTPATIENT
Start: 2019-05-30 | End: 2019-06-20

## 2019-06-03 ENCOUNTER — TELEPHONE (OUTPATIENT)
Dept: INTERNAL MEDICINE CLINIC | Age: 84
End: 2019-06-03

## 2019-06-03 RX ORDER — NYSTATIN 100000 [USP'U]/G
POWDER TOPICAL
Qty: 60 G | Refills: 0 | Status: SHIPPED | OUTPATIENT
Start: 2019-06-03 | End: 2019-12-17

## 2019-06-03 NOTE — TELEPHONE ENCOUNTER
Nader Arciniega called in stating the pt has a rash under both breasts wants to know if something can be called in

## 2019-06-10 ENCOUNTER — OFFICE VISIT (OUTPATIENT)
Dept: CARDIOLOGY CLINIC | Age: 84
End: 2019-06-10
Payer: MEDICARE

## 2019-06-10 VITALS
BODY MASS INDEX: 22.81 KG/M2 | DIASTOLIC BLOOD PRESSURE: 56 MMHG | WEIGHT: 116.8 LBS | SYSTOLIC BLOOD PRESSURE: 100 MMHG | HEART RATE: 88 BPM

## 2019-06-10 DIAGNOSIS — I48.0 PAROXYSMAL ATRIAL FIBRILLATION (HCC): Primary | ICD-10-CM

## 2019-06-10 DIAGNOSIS — I25.10 CAD IN NATIVE ARTERY: ICD-10-CM

## 2019-06-10 DIAGNOSIS — I34.0 NON-RHEUMATIC MITRAL REGURGITATION: ICD-10-CM

## 2019-06-10 DIAGNOSIS — I10 ESSENTIAL HYPERTENSION: ICD-10-CM

## 2019-06-10 DIAGNOSIS — I47.1 SVT (SUPRAVENTRICULAR TACHYCARDIA) (HCC): ICD-10-CM

## 2019-06-10 DIAGNOSIS — I35.1 NONRHEUMATIC AORTIC VALVE INSUFFICIENCY: ICD-10-CM

## 2019-06-10 PROCEDURE — 1090F PRES/ABSN URINE INCON ASSESS: CPT | Performed by: INTERNAL MEDICINE

## 2019-06-10 PROCEDURE — 1123F ACP DISCUSS/DSCN MKR DOCD: CPT | Performed by: INTERNAL MEDICINE

## 2019-06-10 PROCEDURE — 99214 OFFICE O/P EST MOD 30 MIN: CPT | Performed by: INTERNAL MEDICINE

## 2019-06-10 PROCEDURE — G8598 ASA/ANTIPLAT THER USED: HCPCS | Performed by: INTERNAL MEDICINE

## 2019-06-10 PROCEDURE — G8427 DOCREV CUR MEDS BY ELIG CLIN: HCPCS | Performed by: INTERNAL MEDICINE

## 2019-06-10 PROCEDURE — G8420 CALC BMI NORM PARAMETERS: HCPCS | Performed by: INTERNAL MEDICINE

## 2019-06-10 PROCEDURE — 1036F TOBACCO NON-USER: CPT | Performed by: INTERNAL MEDICINE

## 2019-06-10 PROCEDURE — 4040F PNEUMOC VAC/ADMIN/RCVD: CPT | Performed by: INTERNAL MEDICINE

## 2019-06-10 NOTE — PROGRESS NOTES
Subjective:      Patient ID: Alfred Marie is a 80 y.o. female. HPI: Patient is here for her follow up Afib/CAD/HTN/SVT/palp/valvular heart disease. Not real active. No chest pain. No sob. No sx. No pnd or orthopnea. Rhythm stable. No tachycardia. No syncope. Edema  Comes and goes but about same. Stable. Past Medical History:   Diagnosis Date    CAD (coronary artery disease)     Hiatal hernia     Hyperlipidemia     Hypertension     Macular degeneration of both eyes     wet - Dr. Meena Brown     Past Surgical History:   Procedure Laterality Date    APPENDECTOMY      BLADDER REPAIR  Bladder tuck    Bladder tuck    BLADDER SUSPENSION      [place]    CARDIOVASCULAR STRESS TEST      negative - good EF    CHOLECYSTECTOMY      DIAGNOSTIC CARDIAC CATH LAB PROCEDURE      DOPPLER ECHOCARDIOGRAPHY  10/13    normal EF    JOINT REPLACEMENT      TONSILLECTOMY AND ADENOIDECTOMY           Allergies   Allergen Reactions    Pcn [Penicillins]      Patient tolerates Keflex    Sulfa Antibiotics         Social History     Socioeconomic History    Marital status:      Spouse name: Not on file    Number of children: Not on file    Years of education: Not on file    Highest education level: Not on file   Occupational History    Not on file   Social Needs    Financial resource strain: Not on file    Food insecurity:     Worry: Not on file     Inability: Not on file    Transportation needs:     Medical: Not on file     Non-medical: Not on file   Tobacco Use    Smoking status: Former Smoker    Smokeless tobacco: Never Used    Tobacco comment: Quit smoking 30 years ago. Substance and Sexual Activity    Alcohol use: No     Alcohol/week: 0.0 oz    Drug use: No    Sexual activity: Not Currently     Comment: spouse .    Lifestyle    Physical activity:     Days per week: Not on file     Minutes per session: Not on file    Stress: Not on file   Relationships    Social tablet 4    aspirin 81 MG EC tablet Take 81 mg by mouth daily. No current facility-administered medications for this visit. Vitals:    06/10/19 1433   BP: (!) 100/56   Pulse: 88       Wt 116      Review of Systems   Constitutional: Negative for activity change, appetite change and fatigue. Respiratory: Negative for cough, choking, chest tightness. Has some shortness of breath. Cardiovascular: Negative for palpitations. Negative for chest pain Some leg swelling. Denies PND or orthopnea. No tachycardia or syncope. Neurological: Negative for dizziness, syncope and light-headedness. Psychiatric/Behavioral: Negative for behavioral problems, confusion and agitation. All other systems reviewed negative as done. Objective:   Physical Exam   Constitutional: She is oriented to person, place, and time. She appears well-developed and well-nourished. No distress. HENT:   Head: Normocephalic and atraumatic. Eyes: Conjunctivae and EOM are normal. Right eye exhibits no discharge. Left eye exhibits no discharge. Neck: Normal range of motion. No JVD present. Cardiovascular: Normal rate, regular rhythm, S1 normal, S2 normal and normal heart sounds. Exam reveals no gallop. 1/6 syst  murmur heard. Pulses:       Radial pulses are 2+ on the right side, and 2+ on the left side. Pulmonary/Chest: Effort normal and breath sounds normal. No respiratory distress. She has no wheezes. She has no rales. Abdominal: Soft. Bowel sounds are normal. No tenderness. Musculoskeletal: Normal range of motion. She exhibits tr edema. Neurological: She is alert and oriented to person, place, and time. Skin: Skin is warm and dry. Psychiatric: She has a normal mood and affect. Her behavior is normal. Thought content normal.       Assessment:       Diagnosis Orders   1. Paroxysmal atrial fibrillation (HCC)     2. SVT (supraventricular tachycardia) ([address]     3. CAD in native artery     4.  Essential hypertension     5. Nonrheumatic aortic valve insufficiency     6. Non-rheumatic mitral regurgitation             Plan:      CV stable. No angina. BP is good. Rhythm stable. Swelling unchanged. No PND. On AC no bleeding issues. No changes. BP stable. Reviewed previous records and testing including myoview 8/12 and echo 5/15. Echo shows MR/AI but she says she would never consider surgical intervention. Will continue to follow clinically. Continue medical therapy. Follow up 3 months .

## 2019-06-20 ENCOUNTER — TELEPHONE (OUTPATIENT)
Dept: INTERNAL MEDICINE CLINIC | Age: 84
End: 2019-06-20

## 2019-06-20 DIAGNOSIS — M80.00XA AGE-RELATED OSTEOPOROSIS WITH CURRENT PATHOLOGICAL FRACTURE, INITIAL ENCOUNTER: Primary | Chronic | ICD-10-CM

## 2019-06-20 RX ORDER — DONEPEZIL HYDROCHLORIDE 10 MG/1
10 TABLET, FILM COATED ORAL NIGHTLY
Qty: 30 TABLET | Refills: 2 | Status: SHIPPED | OUTPATIENT
Start: 2019-06-20 | End: 2019-09-25 | Stop reason: SDUPTHER

## 2019-06-21 NOTE — TELEPHONE ENCOUNTER
Requested Prescriptions     Pending Prescriptions Disp Refills    benazepril (LOTENSIN) 5 MG tablet [Pharmacy Med Name: BENAZEPRIL HCL  5MG TAB] 90 tablet 1     Si TABLET BY MOUTH ONCE DAILY         Last ov:6/10/19    Next ov:19    Last fill:18    Last labs:3/22/19

## 2019-06-24 ENCOUNTER — TELEPHONE (OUTPATIENT)
Dept: CARDIOLOGY CLINIC | Age: 84
End: 2019-06-24

## 2019-06-24 RX ORDER — APIXABAN 2.5 MG/1
TABLET, FILM COATED ORAL
Qty: 60 TABLET | Refills: 4 | Status: SHIPPED | OUTPATIENT
Start: 2019-06-24 | End: 2019-12-30

## 2019-06-24 NOTE — TELEPHONE ENCOUNTER
I Medication Refills:    benazepril (LOTENSIN) 10 MG tablet  Take 0.5 tablets by mouth daily, Disp-90 tablet, -[address] [place] [address]    Last Office Visit: 06/10/19    Next Office Visit: 09/11/19    Last Refill: 05/24/18

## 2019-06-25 NOTE — TELEPHONE ENCOUNTER
Pharmacy is calling about the refill    Asking when can they fill this for the pt    Pt is out of medication

## 2019-06-27 ENCOUNTER — TELEPHONE (OUTPATIENT)
Dept: INTERNAL MEDICINE CLINIC | Age: 84
End: 2019-06-27

## 2019-06-27 RX ORDER — CLOTRIMAZOLE AND BETAMETHASONE DIPROPIONATE 10; .64 MG/G; MG/G
CREAM TOPICAL
Qty: 15 G | Refills: 0 | Status: SHIPPED | OUTPATIENT
Start: 2019-06-27 | End: 2019-12-17

## 2019-06-27 RX ORDER — BENAZEPRIL HYDROCHLORIDE 10 MG/1
5 TABLET ORAL DAILY
Qty: 90 TABLET | Refills: 4 | Status: SHIPPED | OUTPATIENT
Start: 2019-06-27 | End: 2019-06-27

## 2019-06-27 RX ORDER — BENAZEPRIL HYDROCHLORIDE 5 MG/1
TABLET, FILM COATED ORAL
Qty: 90 TABLET | Refills: 1 | Status: SHIPPED | OUTPATIENT
Start: 2019-06-27 | End: 2019-06-27 | Stop reason: SDUPTHER

## 2019-06-27 RX ORDER — BENAZEPRIL HYDROCHLORIDE 5 MG/1
5 TABLET, FILM COATED ORAL DAILY
Qty: 90 TABLET | Refills: 3 | Status: ON HOLD | OUTPATIENT
Start: 2019-06-27 | End: 2020-01-28 | Stop reason: HOSPADM

## 2019-06-27 NOTE — TELEPHONE ENCOUNTER
Pt had her daughter Destiney Brown call, she is not on hippa form I spoke to pt and she gave verbal permission to speak on her behalf.     Pt is experiencing a rash under both breast, a medication for this is requested

## 2019-07-29 RX ORDER — SIMVASTATIN 20 MG
TABLET ORAL
Qty: 30 TABLET | Refills: 0 | Status: SHIPPED | OUTPATIENT
Start: 2019-07-29 | End: 2019-09-07 | Stop reason: SDUPTHER

## 2019-08-10 RX ORDER — AMLODIPINE BESYLATE 10 MG/1
TABLET ORAL
Qty: 30 TABLET | Refills: 2 | Status: SHIPPED | OUTPATIENT
Start: 2019-08-10 | End: 2019-12-04 | Stop reason: SDUPTHER

## 2019-08-28 ENCOUNTER — APPOINTMENT (OUTPATIENT)
Dept: GENERAL RADIOLOGY | Age: 84
End: 2019-08-28
Payer: MEDICARE

## 2019-08-28 ENCOUNTER — HOSPITAL ENCOUNTER (EMERGENCY)
Age: 84
Discharge: HOME | End: 2019-08-28
Attending: EMERGENCY MEDICINE
Payer: MEDICARE

## 2019-08-28 VITALS
RESPIRATION RATE: 17 BRPM | HEART RATE: 88 BPM | BODY MASS INDEX: 22.78 KG/M2 | TEMPERATURE: 98.7 F | HEIGHT: 60 IN | SYSTOLIC BLOOD PRESSURE: 112 MMHG | DIASTOLIC BLOOD PRESSURE: 56 MMHG | OXYGEN SATURATION: 96 % | WEIGHT: 116 LBS

## 2019-08-28 DIAGNOSIS — S30.0XXA LUMBAR CONTUSION, INITIAL ENCOUNTER: Primary | ICD-10-CM

## 2019-08-28 DIAGNOSIS — N39.0 UTI (URINARY TRACT INFECTION), UNCOMPLICATED: ICD-10-CM

## 2019-08-28 LAB
BACTERIA: ABNORMAL /HPF
BILIRUBIN URINE: NEGATIVE
BLOOD, URINE: ABNORMAL
CLARITY: CLEAR
COLOR: YELLOW
GLUCOSE URINE: NEGATIVE MG/DL
KETONES, URINE: NEGATIVE MG/DL
LEUKOCYTE ESTERASE, URINE: ABNORMAL
MICROSCOPIC EXAMINATION: YES
NITRITE, URINE: POSITIVE
PH UA: 6 (ref 5–8)
PROTEIN UA: 30 MG/DL
RBC UA: ABNORMAL /HPF (ref 0–2)
SPECIFIC GRAVITY UA: 1.01 (ref 1–1.03)
URINE TYPE: ABNORMAL
UROBILINOGEN, URINE: 0.2 E.U./DL
WBC UA: ABNORMAL /HPF (ref 0–5)

## 2019-08-28 PROCEDURE — 87186 SC STD MICRODIL/AGAR DIL: CPT

## 2019-08-28 PROCEDURE — 73522 X-RAY EXAM HIPS BI 3-4 VIEWS: CPT

## 2019-08-28 PROCEDURE — 87077 CULTURE AEROBIC IDENTIFY: CPT

## 2019-08-28 PROCEDURE — 81001 URINALYSIS AUTO W/SCOPE: CPT

## 2019-08-28 PROCEDURE — 87086 URINE CULTURE/COLONY COUNT: CPT

## 2019-08-28 PROCEDURE — 99284 EMERGENCY DEPT VISIT MOD MDM: CPT

## 2019-08-28 PROCEDURE — 72100 X-RAY EXAM L-S SPINE 2/3 VWS: CPT

## 2019-08-28 RX ORDER — LIDOCAINE 4 G/G
1 PATCH TOPICAL DAILY
Qty: 7 PATCH | Refills: 0 | Status: SHIPPED | OUTPATIENT
Start: 2019-08-28 | End: 2019-09-04

## 2019-08-28 RX ORDER — NITROFURANTOIN 25; 75 MG/1; MG/1
100 CAPSULE ORAL 2 TIMES DAILY
Qty: 10 CAPSULE | Refills: 0 | Status: SHIPPED | OUTPATIENT
Start: 2019-08-28 | End: 2019-09-02

## 2019-08-28 ASSESSMENT — ENCOUNTER SYMPTOMS
NAUSEA: 0
BACK PAIN: 1
VOMITING: 0
SHORTNESS OF BREATH: 0
ABDOMINAL PAIN: 0
CHEST TIGHTNESS: 0

## 2019-08-28 ASSESSMENT — PAIN DESCRIPTION - DESCRIPTORS: DESCRIPTORS: ACHING

## 2019-08-28 ASSESSMENT — PAIN DESCRIPTION - LOCATION: LOCATION: GROIN

## 2019-08-28 ASSESSMENT — PAIN DESCRIPTION - PROGRESSION: CLINICAL_PROGRESSION: GRADUALLY WORSENING

## 2019-08-28 ASSESSMENT — PAIN SCALES - GENERAL: PAINLEVEL_OUTOF10: 7

## 2019-08-28 ASSESSMENT — PAIN DESCRIPTION - ORIENTATION: ORIENTATION: RIGHT;LEFT

## 2019-08-28 ASSESSMENT — PAIN DESCRIPTION - PAIN TYPE: TYPE: ACUTE PAIN

## 2019-08-28 ASSESSMENT — PAIN DESCRIPTION - FREQUENCY: FREQUENCY: CONTINUOUS

## 2019-08-28 NOTE — ED NOTES
Bed: B17-  Expected date:   Expected time:   Means of arrival:   Comments:  Sima Meléndez RN  08/28/19 [id number]

## 2019-08-28 NOTE — FLOWSHEET NOTE
08/28/19 1418   Encounter Summary   Services provided to: Patient   Referral/Consult From: [address] staff   Continue Visiting   [facility] 8/28/19, [place] )   Complexity of Encounter Moderate   Length of Encounter 15 minutes   Routine   Type Initial   Assessment Approachable   Intervention Nurtured hope   Outcome Expressed gratitude

## 2019-08-28 NOTE — ED TRIAGE NOTES
Pt from [facility] assisted living and c/o left hip pain and middle pubic pain. EMS states pt had low grade fever at facility 98.7 on arrival.   Pt also c/o rash on back since last night. Pt may have some dry skin but did not really see a rash. When checking pt's groin area I asked where it hurt and pt states, \"shoulder blades. \"     Pt states she took a tylenol at noon today and helped a little. Pt states pain in back and feels weaker    Pt poor historian.

## 2019-08-28 NOTE — ED PROVIDER NOTES
and adenoidectomy; joint replacement; bladder repair (Bladder tuck); bladder suspension (2008); cardiovascular stress test (8/12); and doppler echocardiography (10/13). Her family history includes Heart Attack in her father and mother; Heart Disease in her father and mother; Other in her daughter. She reports that she has quit smoking. She has never used smokeless tobacco. She reports that she does not drink alcohol or use drugs. Medications     Discharge Medication List as of 8/28/2019  4:15 PM      CONTINUE these medications which have NOT CHANGED    Details   amLODIPine (NORVASC) 10 MG tablet 1 TABLET BY MOUTH EVERY MORNING, Disp-30 tablet, R-2Normal      simvastatin (ZOCOR) 20 MG tablet 1 TABLET BY MOUTH AT BEDTIME * NO GRAPEFRUIT *, Disp-30 tablet, R-0Normal      benazepril (LOTENSIN) 5 MG tablet Take 1 tablet by mouth daily, Disp-90 tablet, R-3Normal      clotrimazole-betamethasone (LOTRISONE) 1-0.05 % cream Apply topically 2 times daily. , Disp-15 g, R-0, Normal      ELIQUIS 2.5 MG TABS tablet 1 TABLET BY MOUTH TWICE DAILY, Disp-60 tablet, R-4Normal      donepezil (ARICEPT) 10 MG tablet TAKE 1 TABLET BY MOUTH NIGHTLY, Disp-30 tablet, R-2Normal      nystatin (MYCOSTATIN) 536123 UNIT/GM powder Apply bid, Disp-60 g, R-0, Normal      famotidine (PEPCID) 20 MG tablet TAKE 1 TABLET BY MOUTH DAILY, Disp-90 tablet, R-2Normal      !! metoprolol tartrate (LOPRESSOR) 25 MG tablet Take 0.5 tablets by mouth 2 times daily, Disp-60 tablet, R-3Print      traMADol (ULTRAM) 50 MG tablet Take 50 mg by mouth every 6 hours as needed for Pain. Historical Med      ferrous sulfate 325 (65 Fe) MG tablet Take 325 mg by mouth 2 times daily (with meals)Historical Med      !! metoprolol tartrate (LOPRESSOR) 25 MG tablet TAKE 1 TABLET BY MOUTH 2 TIMES DAILY.  THIS REPLACES THE ATENOLOL., Disp-60 tablet, R-3Normal      calcium carbonate 600 MG TABS tablet Take 1 tablet by mouth dailyHistorical Med      aspirin 81 MG EC tablet Take 81 mg

## 2019-08-30 DIAGNOSIS — M54.50 CHRONIC MIDLINE LOW BACK PAIN WITHOUT SCIATICA: Primary | ICD-10-CM

## 2019-08-30 DIAGNOSIS — G89.29 CHRONIC MIDLINE LOW BACK PAIN WITHOUT SCIATICA: Primary | ICD-10-CM

## 2019-08-30 LAB
ORGANISM: ABNORMAL
ORGANISM: ABNORMAL
URINE CULTURE, ROUTINE: ABNORMAL
URINE CULTURE, ROUTINE: ABNORMAL

## 2019-08-30 RX ORDER — TRAMADOL HYDROCHLORIDE 50 MG/1
50 TABLET ORAL EVERY 6 HOURS PRN
Qty: 60 TABLET | Refills: 0 | OUTPATIENT
Start: 2019-08-30 | End: 2019-09-29

## 2019-08-30 NOTE — TELEPHONE ENCOUNTER
Xiomara Turcios, the caregiver called requesting a refill on :     traMADol (ULTRAM) 50 MG tablet [[id number]    Patient was in the emergency room on Wednesday, 08/28/19, for UTI. They gave her lidocaine patches but she is still complaining of lower back pain. Please call to advise.      [address] [place] [address]

## 2019-08-30 NOTE — TELEPHONE ENCOUNTER
Ok to phone in. Orders Placed This Encounter   Medications    traMADol (ULTRAM) 50 MG tablet     Sig: Take 1 tablet by mouth every 6 hours as needed for Pain for up to 30 days. Dispense:  60 tablet     Refill:  0     Reduce doses taken as pain becomes manageable         Controlled Substance Monitoring:    Acute and Chronic Pain Monitoring:   RX Monitoring 8/30/2019   Attestation -   Periodic Controlled Substance Monitoring No signs of potential drug abuse or diversion identified.

## 2019-09-09 RX ORDER — SIMVASTATIN 20 MG
TABLET ORAL
Qty: 30 TABLET | Refills: 0 | Status: SHIPPED | OUTPATIENT
Start: 2019-09-09 | End: 2019-10-15 | Stop reason: SDUPTHER

## 2019-09-11 ENCOUNTER — OFFICE VISIT (OUTPATIENT)
Dept: CARDIOLOGY CLINIC | Age: 84
End: 2019-09-11
Payer: MEDICARE

## 2019-09-11 VITALS
SYSTOLIC BLOOD PRESSURE: 130 MMHG | DIASTOLIC BLOOD PRESSURE: 70 MMHG | HEART RATE: 76 BPM | BODY MASS INDEX: 22.85 KG/M2 | WEIGHT: 117 LBS

## 2019-09-11 DIAGNOSIS — I34.0 NON-RHEUMATIC MITRAL REGURGITATION: ICD-10-CM

## 2019-09-11 DIAGNOSIS — I48.0 PAROXYSMAL ATRIAL FIBRILLATION (HCC): Primary | ICD-10-CM

## 2019-09-11 DIAGNOSIS — I47.1 SVT (SUPRAVENTRICULAR TACHYCARDIA) (HCC): ICD-10-CM

## 2019-09-11 DIAGNOSIS — I25.10 CAD IN NATIVE ARTERY: ICD-10-CM

## 2019-09-11 DIAGNOSIS — I35.1 NONRHEUMATIC AORTIC VALVE INSUFFICIENCY: ICD-10-CM

## 2019-09-11 DIAGNOSIS — I10 ESSENTIAL HYPERTENSION: ICD-10-CM

## 2019-09-11 PROCEDURE — 99214 OFFICE O/P EST MOD 30 MIN: CPT | Performed by: INTERNAL MEDICINE

## 2019-09-11 PROCEDURE — 4040F PNEUMOC VAC/ADMIN/RCVD: CPT | Performed by: INTERNAL MEDICINE

## 2019-09-11 PROCEDURE — G8598 ASA/ANTIPLAT THER USED: HCPCS | Performed by: INTERNAL MEDICINE

## 2019-09-11 PROCEDURE — 1123F ACP DISCUSS/DSCN MKR DOCD: CPT | Performed by: INTERNAL MEDICINE

## 2019-09-11 PROCEDURE — G8427 DOCREV CUR MEDS BY ELIG CLIN: HCPCS | Performed by: INTERNAL MEDICINE

## 2019-09-11 PROCEDURE — 1036F TOBACCO NON-USER: CPT | Performed by: INTERNAL MEDICINE

## 2019-09-11 PROCEDURE — 1090F PRES/ABSN URINE INCON ASSESS: CPT | Performed by: INTERNAL MEDICINE

## 2019-09-11 PROCEDURE — G8420 CALC BMI NORM PARAMETERS: HCPCS | Performed by: INTERNAL MEDICINE

## 2019-09-11 NOTE — PROGRESS NOTES
Subjective:      Patient ID: David Ferguson is a 80 y.o. female. HPI: Patient is here for her follow up Afib/CAD/HTN/SVT/palp/valvular heart disease. Not real active. No chest pain. No sob. No sx. No pnd or orthopnea. Rhythm stable. No tachycardia. No syncope. Edema comes and goes but about same. Unchanged. Past Medical History:   Diagnosis Date    CAD (coronary artery disease)     Hiatal hernia     Hyperlipidemia     Hypertension     Macular degeneration of both eyes     wet - Dr. Keith Phillips     Past Surgical History:   Procedure Laterality Date    APPENDECTOMY      BLADDER REPAIR  Bladder tuck    Bladder tuck    BLADDER SUSPENSION      [place]    CARDIOVASCULAR STRESS TEST      negative - good EF    CHOLECYSTECTOMY      DIAGNOSTIC CARDIAC CATH LAB PROCEDURE      DOPPLER ECHOCARDIOGRAPHY  10/13    normal EF    JOINT REPLACEMENT      TONSILLECTOMY AND ADENOIDECTOMY           Allergies   Allergen Reactions    Pcn [Penicillins]      Patient tolerates Keflex    Sulfa Antibiotics         Social History     Socioeconomic History    Marital status:      Spouse name: Not on file    Number of children: Not on file    Years of education: Not on file    Highest education level: Not on file   Occupational History    Not on file   Social Needs    Financial resource strain: Not on file    Food insecurity:     Worry: Not on file     Inability: Not on file    Transportation needs:     Medical: Not on file     Non-medical: Not on file   Tobacco Use    Smoking status: Former Smoker    Smokeless tobacco: Never Used    Tobacco comment: Quit smoking 30 years ago. Substance and Sexual Activity    Alcohol use: No     Alcohol/week: 0.0 standard drinks    Drug use: No    Sexual activity: Not Currently     Comment: spouse .    Lifestyle    Physical activity:     Days per week: Not on file     Minutes per session: Not on file    Stress: Not on file   Relationships Paroxysmal atrial fibrillation (HCC)     2. SVT (supraventricular tachycardia) ([address]     3. Essential hypertension     4. CAD in native artery     5. Nonrheumatic aortic valve insufficiency     6. Non-rheumatic mitral regurgitation             Plan:      CV stable. No angina. BP is good. Rhythm stable. Swelling unchanged. No PND. On AC no bleeding issues. No changes. Reviewed previous records and testing including myoview 8/12 and echo 5/15. Echo shows MR/AI but she says she would never consider surgical intervention. Will continue to follow clinically. Continue medical therapy. Follow up 3 months .

## 2019-09-17 ENCOUNTER — OFFICE VISIT (OUTPATIENT)
Dept: INTERNAL MEDICINE CLINIC | Age: 84
End: 2019-09-17
Payer: MEDICARE

## 2019-09-17 VITALS
OXYGEN SATURATION: 90 % | SYSTOLIC BLOOD PRESSURE: 110 MMHG | HEIGHT: 60 IN | WEIGHT: 114 LBS | DIASTOLIC BLOOD PRESSURE: 50 MMHG | HEART RATE: 96 BPM | BODY MASS INDEX: 22.38 KG/M2

## 2019-09-17 DIAGNOSIS — I48.0 PAROXYSMAL ATRIAL FIBRILLATION (HCC): Chronic | ICD-10-CM

## 2019-09-17 DIAGNOSIS — Z09 FOLLOW-UP EXAM: ICD-10-CM
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
## 2019-09-17 PROCEDURE — 1123F ACP DISCUSS/DSCN MKR DOCD: CPT | Performed by: INTERNAL MEDICINE

## 2019-09-17 PROCEDURE — G8420 CALC BMI NORM PARAMETERS: HCPCS | Performed by: INTERNAL MEDICINE

## 2019-09-17 PROCEDURE — G8427 DOCREV CUR MEDS BY ELIG CLIN: HCPCS | Performed by: INTERNAL MEDICINE

## 2019-09-17 PROCEDURE — 99214 OFFICE O/P EST MOD 30 MIN: CPT | Performed by: INTERNAL MEDICINE

## 2019-09-17 PROCEDURE — 1090F PRES/ABSN URINE INCON ASSESS: CPT | Performed by: INTERNAL MEDICINE

## 2019-09-17 PROCEDURE — 4040F PNEUMOC VAC/ADMIN/RCVD: CPT | Performed by: INTERNAL MEDICINE

## 2019-09-17 PROCEDURE — G8598 ASA/ANTIPLAT THER USED: HCPCS | Performed by: INTERNAL MEDICINE

## 2019-09-17 PROCEDURE — 1036F TOBACCO NON-USER: CPT | Performed by: INTERNAL MEDICINE

## 2019-09-17 ASSESSMENT — ENCOUNTER SYMPTOMS
EYES NEGATIVE: 1
BLOOD IN STOOL: 0
CONSTIPATION: 0
SHORTNESS OF BREATH: 0
CHEST TIGHTNESS: 0
COUGH: 0
DIARRHEA: 0
BACK PAIN: 0
ABDOMINAL PAIN: 0
NAUSEA: 0
COLOR CHANGE: 0
ABDOMINAL DISTENTION: 0
APNEA: 0
RESPIRATORY NEGATIVE: 1
CHOKING: 0
GASTROINTESTINAL NEGATIVE: 1
STRIDOR: 0
WHEEZING: 0

## 2019-09-17 NOTE — ASSESSMENT & PLAN NOTE
Patient saw her cardiologist on 9/16/19. In NSR today.  No complaints of chest pain, SOB or palpitations

## 2019-09-17 NOTE — ASSESSMENT & PLAN NOTE
F/U appointment from ED visit after having UTI growing E. Coli and proteus. Was put on Macrobid. Today no longer complaining of pain in pubic area or lower back.  No fever, chills, increased frequency or hematuria

## 2019-09-26 RX ORDER — DONEPEZIL HYDROCHLORIDE 10 MG/1
10 TABLET, FILM COATED ORAL NIGHTLY
Qty: 30 TABLET | Refills: 0 | Status: SHIPPED | OUTPATIENT
Start: 2019-09-26 | End: 2019-12-17

## 2019-10-17 PROBLEM — Z09 FOLLOW-UP EXAM: Status: RESOLVED | Noted: 2019-09-17 | Resolved: 2019-10-17

## 2019-12-04 RX ORDER — AMLODIPINE BESYLATE 10 MG/1
TABLET ORAL
Qty: 30 TABLET | Refills: 3 | Status: ON HOLD | OUTPATIENT
Start: 2019-12-04 | End: 2020-04-04 | Stop reason: SDUPTHER

## 2019-12-09 ENCOUNTER — CARE COORDINATION (OUTPATIENT)
Dept: CARE COORDINATION | Age: 84
End: 2019-12-09

## 2019-12-09 ENCOUNTER — TELEPHONE (OUTPATIENT)
Dept: INTERNAL MEDICINE CLINIC | Age: 84
End: 2019-12-09

## 2019-12-09 NOTE — TELEPHONE ENCOUNTER
They should immediately talk to the facility where she is living to see if they have an assisted living facility available. This would be a logical next step, but if it continues to progress rapidly then she would even need more of a full assistance area, or what is called a memory unit. If absolutely necessary we will try to see if our care coordinator, Rosa Maria Rahman, could somehow assist with this.

## 2019-12-11 ENCOUNTER — OFFICE VISIT (OUTPATIENT)
Dept: CARDIOLOGY CLINIC | Age: 84
End: 2019-12-11
Payer: MEDICARE

## 2019-12-11 VITALS
SYSTOLIC BLOOD PRESSURE: 118 MMHG | HEART RATE: 84 BPM | WEIGHT: 112 LBS | DIASTOLIC BLOOD PRESSURE: 70 MMHG | BODY MASS INDEX: 21.87 KG/M2

## 2019-12-11 DIAGNOSIS — I10 ESSENTIAL HYPERTENSION: ICD-10-CM

## 2019-12-11 DIAGNOSIS — I25.10 CAD IN NATIVE ARTERY: ICD-10-CM

## 2019-12-11 DIAGNOSIS — I35.1 NONRHEUMATIC AORTIC VALVE INSUFFICIENCY: ICD-10-CM

## 2019-12-11 DIAGNOSIS — I47.1 SVT (SUPRAVENTRICULAR TACHYCARDIA) (HCC): ICD-10-CM

## 2019-12-11 DIAGNOSIS — I34.0 NONRHEUMATIC MITRAL VALVE REGURGITATION: ICD-10-CM

## 2019-12-11 DIAGNOSIS — I48.0 PAROXYSMAL ATRIAL FIBRILLATION (HCC): Primary | ICD-10-CM

## 2019-12-11 PROCEDURE — 1090F PRES/ABSN URINE INCON ASSESS: CPT | Performed by: INTERNAL MEDICINE

## 2019-12-11 PROCEDURE — G8484 FLU IMMUNIZE NO ADMIN: HCPCS | Performed by: INTERNAL MEDICINE

## 2019-12-11 PROCEDURE — G8598 ASA/ANTIPLAT THER USED: HCPCS | Performed by: INTERNAL MEDICINE

## 2019-12-11 PROCEDURE — G8428 CUR MEDS NOT DOCUMENT: HCPCS | Performed by: INTERNAL MEDICINE

## 2019-12-11 PROCEDURE — G8420 CALC BMI NORM PARAMETERS: HCPCS | Performed by: INTERNAL MEDICINE

## 2019-12-11 PROCEDURE — 1123F ACP DISCUSS/DSCN MKR DOCD: CPT | Performed by: INTERNAL MEDICINE

## 2019-12-11 PROCEDURE — 1036F TOBACCO NON-USER: CPT | Performed by: INTERNAL MEDICINE

## 2019-12-11 PROCEDURE — 99214 OFFICE O/P EST MOD 30 MIN: CPT | Performed by: INTERNAL MEDICINE

## 2019-12-11 PROCEDURE — 4040F PNEUMOC VAC/ADMIN/RCVD: CPT | Performed by: INTERNAL MEDICINE

## 2019-12-11 NOTE — TELEPHONE ENCOUNTER
Daughter is calling back again for the information. Informed she is not on HIPPA and her mother was contacted. She stated the pt has memory issues and she wants to be called. States she has medical power of . She states like most doctors he has not called and she would appreciate it if he calls.

## 2019-12-17 ENCOUNTER — OFFICE VISIT (OUTPATIENT)
Dept: INTERNAL MEDICINE CLINIC | Age: 84
End: 2019-12-17
Payer: MEDICARE

## 2019-12-17 VITALS
SYSTOLIC BLOOD PRESSURE: 128 MMHG | WEIGHT: 112 LBS | DIASTOLIC BLOOD PRESSURE: 60 MMHG | HEIGHT: 60 IN | BODY MASS INDEX: 21.99 KG/M2 | OXYGEN SATURATION: 96 %

## 2019-12-17 DIAGNOSIS — R41.3 MEMORY DIFFICULTIES: ICD-10-CM

## 2019-12-17 DIAGNOSIS — R09.02 HYPOXIA: ICD-10-CM

## 2019-12-17 DIAGNOSIS — I48.0 PAROXYSMAL ATRIAL FIBRILLATION (HCC): ICD-10-CM

## 2019-12-17 DIAGNOSIS — D64.9 ANEMIA, UNSPECIFIED TYPE: ICD-10-CM

## 2019-12-17 DIAGNOSIS — I10 ESSENTIAL HYPERTENSION: ICD-10-CM

## 2019-12-17 DIAGNOSIS — I48.0 PAROXYSMAL ATRIAL FIBRILLATION (HCC): Primary | ICD-10-CM

## 2019-12-17 DIAGNOSIS — E78.5 HYPERLIPIDEMIA, UNSPECIFIED HYPERLIPIDEMIA TYPE: ICD-10-CM

## 2019-12-17 PROBLEM — I51.89 DIASTOLIC DYSFUNCTION: Chronic | Status: RESOLVED | Noted: 2018-07-27 | Resolved: 2019-12-17

## 2019-12-17 PROBLEM — R01.1 HEART MURMUR: Chronic | Status: RESOLVED | Noted: 2018-07-27 | Resolved: 2019-12-17

## 2019-12-17 PROBLEM — J18.9 COMMUNITY ACQUIRED PNEUMONIA: Status: RESOLVED | Noted: 2019-03-19 | Resolved: 2019-12-17

## 2019-12-17 PROBLEM — I16.0 HYPERTENSIVE URGENCY: Status: RESOLVED | Noted: 2019-03-19 | Resolved: 2019-12-17

## 2019-12-17 PROBLEM — J96.01 ACUTE RESPIRATORY FAILURE WITH HYPOXIA (HCC): Status: RESOLVED | Noted: 2019-03-19 | Resolved: 2019-12-17

## 2019-12-17 PROBLEM — L53.9 ERYTHEMA OF LOWER EXTREMITY: Status: RESOLVED | Noted: 2018-10-29 | Resolved: 2019-12-17

## 2019-12-17 PROBLEM — Z79.01 ANTICOAGULATED: Chronic | Status: RESOLVED | Noted: 2019-03-19 | Resolved: 2019-12-17

## 2019-12-17 PROBLEM — R20.0 LEFT LEG NUMBNESS: Status: RESOLVED | Noted: 2019-02-04 | Resolved: 2019-12-17
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
## 2019-12-17 LAB
A/G RATIO: 1.6 (ref 1.1–2.2)
ALBUMIN SERPL-MCNC: 4.2 G/DL (ref 3.4–5)
ALP BLD-CCNC: 69 U/L (ref 40–129)
ALT SERPL-CCNC: 6 U/L (ref 10–40)
ANION GAP SERPL CALCULATED.3IONS-SCNC: 17 MMOL/L (ref 3–16)
AST SERPL-CCNC: 15 U/L (ref 15–37)
BASOPHILS ABSOLUTE: 0 K/UL (ref 0–0.2)
BASOPHILS RELATIVE PERCENT: 0.5 %
BILIRUB SERPL-MCNC: <0.2 MG/DL (ref 0–1)
BUN BLDV-MCNC: 15 MG/DL (ref 7–20)
CALCIUM SERPL-MCNC: 9.9 MG/DL (ref 8.3–10.6)
CHLORIDE BLD-SCNC: 101 MMOL/L (ref 99–110)
CHOLESTEROL, TOTAL: 163 MG/DL (ref 0–199)
CO2: 25 MMOL/L (ref 21–32)
CREAT SERPL-MCNC: 0.9 MG/DL (ref 0.6–1.2)
EOSINOPHILS ABSOLUTE: 0 K/UL (ref 0–0.6)
EOSINOPHILS RELATIVE PERCENT: 0.7 %
GFR AFRICAN AMERICAN: >60
GFR NON-AFRICAN AMERICAN: 58
GLOBULIN: 2.7 G/DL
GLUCOSE BLD-MCNC: 80 MG/DL (ref 70–99)
HCT VFR BLD CALC: 34.2 % (ref 36–48)
HDLC SERPL-MCNC: 90 MG/DL (ref 40–60)
HEMOGLOBIN: 10.8 G/DL (ref 12–16)
LDL CHOLESTEROL CALCULATED: 55 MG/DL
LYMPHOCYTES ABSOLUTE: 0.5 K/UL (ref 1–5.1)
LYMPHOCYTES RELATIVE PERCENT: 7.9 %
MCH RBC QN AUTO: 28.2 PG (ref 26–34)
MCHC RBC AUTO-ENTMCNC: 31.5 G/DL (ref 31–36)
MCV RBC AUTO: 89.5 FL (ref 80–100)
MONOCYTES ABSOLUTE: 0.4 K/UL (ref 0–1.3)
MONOCYTES RELATIVE PERCENT: 6.7 %
NEUTROPHILS ABSOLUTE: 5.2 K/UL (ref 1.7–7.7)
NEUTROPHILS RELATIVE PERCENT: 84.2 %
PDW BLD-RTO: 14.6 % (ref 12.4–15.4)
PLATELET # BLD: 368 K/UL (ref 135–450)
PMV BLD AUTO: 8 FL (ref 5–10.5)
POTASSIUM SERPL-SCNC: 4.6 MMOL/L (ref 3.5–5.1)
RBC # BLD: 3.82 M/UL (ref 4–5.2)
SODIUM BLD-SCNC: 143 MMOL/L (ref 136–145)
TOTAL PROTEIN: 6.9 G/DL (ref 6.4–8.2)
TRIGL SERPL-MCNC: 90 MG/DL (ref 0–150)
TSH REFLEX FT4: 3.21 UIU/ML (ref 0.27–4.2)
VITAMIN B-12: 231 PG/ML (ref 211–911)
VLDLC SERPL CALC-MCNC: 18 MG/DL
WBC # BLD: 6.1 K/UL (ref 4–11)

## 2019-12-17 PROCEDURE — 4040F PNEUMOC VAC/ADMIN/RCVD: CPT | Performed by: INTERNAL MEDICINE

## 2019-12-17 PROCEDURE — 99214 OFFICE O/P EST MOD 30 MIN: CPT | Performed by: INTERNAL MEDICINE

## 2019-12-17 PROCEDURE — G8427 DOCREV CUR MEDS BY ELIG CLIN: HCPCS | Performed by: INTERNAL MEDICINE

## 2019-12-17 PROCEDURE — G8598 ASA/ANTIPLAT THER USED: HCPCS | Performed by: INTERNAL MEDICINE

## 2019-12-17 PROCEDURE — G8420 CALC BMI NORM PARAMETERS: HCPCS | Performed by: INTERNAL MEDICINE

## 2019-12-17 PROCEDURE — 1036F TOBACCO NON-USER: CPT | Performed by: INTERNAL MEDICINE

## 2019-12-17 PROCEDURE — 3288F FALL RISK ASSESSMENT DOCD: CPT | Performed by: INTERNAL MEDICINE

## 2019-12-17 PROCEDURE — 1090F PRES/ABSN URINE INCON ASSESS: CPT | Performed by: INTERNAL MEDICINE

## 2019-12-17 PROCEDURE — G8510 SCR DEP NEG, NO PLAN REQD: HCPCS | Performed by: INTERNAL MEDICINE

## 2019-12-17 PROCEDURE — 1123F ACP DISCUSS/DSCN MKR DOCD: CPT | Performed by: INTERNAL MEDICINE

## 2019-12-17 PROCEDURE — G8484 FLU IMMUNIZE NO ADMIN: HCPCS | Performed by: INTERNAL MEDICINE

## 2019-12-17 RX ORDER — DONEPEZIL HYDROCHLORIDE 10 MG/1
TABLET, FILM COATED ORAL
Qty: 60 TABLET | Refills: 5 | Status: SHIPPED | OUTPATIENT
Start: 2019-12-17 | End: 2020-06-09

## 2019-12-17 ASSESSMENT — PATIENT HEALTH QUESTIONNAIRE - PHQ9
SUM OF ALL RESPONSES TO PHQ QUESTIONS 1-9: 0
SUM OF ALL RESPONSES TO PHQ9 QUESTIONS 1 & 2: 0
1. LITTLE INTEREST OR PLEASURE IN DOING THINGS: 0
2. FEELING DOWN, DEPRESSED OR HOPELESS: 0
SUM OF ALL RESPONSES TO PHQ QUESTIONS 1-9: 0

## 2019-12-19 RX ORDER — LANOLIN ALCOHOL/MO/W.PET/CERES
1000 CREAM (GRAM) TOPICAL DAILY
Refills: 3 | COMMUNITY
Start: 2019-12-19

## 2019-12-30 RX ORDER — APIXABAN 2.5 MG/1
TABLET, FILM COATED ORAL
Qty: 60 TABLET | Refills: 1 | Status: SHIPPED | OUTPATIENT
Start: 2019-12-30 | End: 2020-02-18

## 2020-01-07 ENCOUNTER — OFFICE VISIT (OUTPATIENT)
Dept: PSYCHOLOGY | Age: 85
End: 2020-01-07
Payer: MEDICARE

## 2020-01-07 PROCEDURE — 90791 PSYCH DIAGNOSTIC EVALUATION: CPT | Performed by: PSYCHOLOGIST

## 2020-01-07 ASSESSMENT — PATIENT HEALTH QUESTIONNAIRE - PHQ9
SUM OF ALL RESPONSES TO PHQ9 QUESTIONS 1 & 2: 4
SUM OF ALL RESPONSES TO PHQ QUESTIONS 1-9: 6
7. TROUBLE CONCENTRATING ON THINGS, SUCH AS READING THE NEWSPAPER OR WATCHING TELEVISION: 1
6. FEELING BAD ABOUT YOURSELF - OR THAT YOU ARE A FAILURE OR HAVE LET YOURSELF OR YOUR FAMILY DOWN: 0
2. FEELING DOWN, DEPRESSED OR HOPELESS: 2
8. MOVING OR SPEAKING SO SLOWLY THAT OTHER PEOPLE COULD HAVE NOTICED. OR THE OPPOSITE, BEING SO FIGETY OR RESTLESS THAT YOU HAVE BEEN MOVING AROUND A LOT MORE THAN USUAL: 1
4. FEELING TIRED OR HAVING LITTLE ENERGY: 0
1. LITTLE INTEREST OR PLEASURE IN DOING THINGS: 2
5. POOR APPETITE OR OVEREATING: 0
3. TROUBLE FALLING OR STAYING ASLEEP: 0
SUM OF ALL RESPONSES TO PHQ QUESTIONS 1-9: 6

## 2020-01-07 ASSESSMENT — ANXIETY QUESTIONNAIRES
GAD7 TOTAL SCORE: 3
3. WORRYING TOO MUCH ABOUT DIFFERENT THINGS: 1-SEVERAL DAYS
5. BEING SO RESTLESS THAT IT IS HARD TO SIT STILL: 0-NOT AT ALL
1. FEELING NERVOUS, ANXIOUS, OR ON EDGE: 0-NOT AT ALL
7. FEELING AFRAID AS IF SOMETHING AWFUL MIGHT HAPPEN: 0-NOT AT ALL
6. BECOMING EASILY ANNOYED OR IRRITABLE: 1-SEVERAL DAYS
4. TROUBLE RELAXING: 1-SEVERAL DAYS
2. NOT BEING ABLE TO STOP OR CONTROL WORRYING: 0-NOT AT ALL

## 2020-01-07 NOTE — PROGRESS NOTES
20-27 = Severe depression      Diagnosis:    1.  Cognitive impairment           Plan:  Pt interventions:    Established rapport, Discussed [address]  model of care vs specialty mental health, Conducted functional assessment, [place]-setting to identify pt's primary goals for [address]  visit / overall health, Supportive techniques, administered the MoCA and treatment planning    Pt Behavioral Change Plan:  Pt set goal to 1) f/u with PCP re: treatment planning

## 2020-01-17 ENCOUNTER — TELEPHONE (OUTPATIENT)
Dept: INTERNAL MEDICINE CLINIC | Age: 85
End: 2020-01-17

## 2020-01-20 ENCOUNTER — TELEPHONE (OUTPATIENT)
Dept: PSYCHOLOGY | Age: 85
End: 2020-01-20

## 2020-01-20 NOTE — TELEPHONE ENCOUNTER
Called and spoke with pt's daughter, Lisa Andrews. Provided her with information about initial visit with this writer on 1/7/2020. Daughter inquired about this writer's recommendation for level of care for pt's living situation. Informed her I did not do a functional assessment and cannot make that recommendation. Encouraged her to ask assisted living facility what can be done in regards to a ADL functional assessment. Also provided her information for [facility] as they may have resources to assist her with navigating this process. Informed pt's daughter a POA for business/finance is different than POA for medical purposes, but since pt gave verbal permission today on the phone for this writer to discuss treatment with her daughter it was not a problem today. Pt's daughter will call assisted living facility and reach out to [address] [place] [address]

## 2020-01-20 NOTE — TELEPHONE ENCOUNTER
Patient daughter called back and is wanting to speak to Dr. Sav Mills. She states she will be in town, the first week of February, and did not know if she needs a follow up appointment, but would like to speak to you. Patient states she will be home for another hour til 11:30,  and back from 2:30 pm  To 3:30pm and then after 5:00pm   She also left her cell, but states she will try to answer it, she does not do to well with it. She also, states her  will be home after 2:30 pm and you can speak to him also. Please call to advise.

## 2020-01-26 ENCOUNTER — HOSPITAL ENCOUNTER (INPATIENT)
Age: 85
LOS: 2 days | Discharge: HOME | DRG: 689 | End: 2020-01-28
Attending: EMERGENCY MEDICINE | Admitting: INTERNAL MEDICINE
Payer: MEDICARE

## 2020-01-26 ENCOUNTER — APPOINTMENT (OUTPATIENT)
Dept: GENERAL RADIOLOGY | Age: 85
DRG: 689 | End: 2020-01-26
Payer: MEDICARE

## 2020-01-26 PROBLEM — G93.40 ACUTE ENCEPHALOPATHY: Status: ACTIVE | Noted: 2020-01-26

## 2020-01-26 LAB
ALBUMIN SERPL-MCNC: 3.6 G/DL (ref 3.4–5)
ALP BLD-CCNC: 69 U/L (ref 40–129)
ALT SERPL-CCNC: <5 U/L (ref 10–40)
ANION GAP SERPL CALCULATED.3IONS-SCNC: 11 MMOL/L (ref 3–16)
AST SERPL-CCNC: 15 U/L (ref 15–37)
BACTERIA: ABNORMAL /HPF
BASOPHILS ABSOLUTE: 0 K/UL (ref 0–0.2)
BASOPHILS RELATIVE PERCENT: 0.9 %
BILIRUB SERPL-MCNC: <0.2 MG/DL (ref 0–1)
BILIRUBIN DIRECT: <0.2 MG/DL (ref 0–0.3)
BILIRUBIN URINE: NEGATIVE
BILIRUBIN, INDIRECT: ABNORMAL MG/DL (ref 0–1)
BLOOD, URINE: ABNORMAL
BUN BLDV-MCNC: 16 MG/DL (ref 7–20)
CALCIUM SERPL-MCNC: 9.4 MG/DL (ref 8.3–10.6)
CHLORIDE BLD-SCNC: 102 MMOL/L (ref 99–110)
CLARITY: ABNORMAL
CO2: 28 MMOL/L (ref 21–32)
COLOR: YELLOW
CREAT SERPL-MCNC: 0.8 MG/DL (ref 0.6–1.2)
EOSINOPHILS ABSOLUTE: 0 K/UL (ref 0–0.6)
EOSINOPHILS RELATIVE PERCENT: 0.7 %
EPITHELIAL CELLS, UA: ABNORMAL /HPF
GFR AFRICAN AMERICAN: >60
GFR NON-AFRICAN AMERICAN: >60
GLUCOSE BLD-MCNC: 94 MG/DL (ref 70–99)
GLUCOSE URINE: NEGATIVE MG/DL
HCT VFR BLD CALC: 30.5 % (ref 36–48)
HEMOGLOBIN: 10 G/DL (ref 12–16)
KETONES, URINE: ABNORMAL MG/DL
LEUKOCYTE ESTERASE, URINE: ABNORMAL
LYMPHOCYTES ABSOLUTE: 0.5 K/UL (ref 1–5.1)
LYMPHOCYTES RELATIVE PERCENT: 9.1 %
MCH RBC QN AUTO: 29.2 PG (ref 26–34)
MCHC RBC AUTO-ENTMCNC: 32.8 G/DL (ref 31–36)
MCV RBC AUTO: 88.8 FL (ref 80–100)
MICROSCOPIC EXAMINATION: YES
MONOCYTES ABSOLUTE: 0.5 K/UL (ref 0–1.3)
MONOCYTES RELATIVE PERCENT: 9.1 %
NEUTROPHILS ABSOLUTE: 4.4 K/UL (ref 1.7–7.7)
NEUTROPHILS RELATIVE PERCENT: 80.2 %
NITRITE, URINE: POSITIVE
PDW BLD-RTO: 15 % (ref 12.4–15.4)
PH UA: 6 (ref 5–8)
PLATELET # BLD: 287 K/UL (ref 135–450)
PMV BLD AUTO: 7.9 FL (ref 5–10.5)
POTASSIUM REFLEX MAGNESIUM: 4.3 MMOL/L (ref 3.5–5.1)
PROTEIN UA: 30 MG/DL
RBC # BLD: 3.43 M/UL (ref 4–5.2)
RBC UA: ABNORMAL /HPF (ref 0–2)
SODIUM BLD-SCNC: 141 MMOL/L (ref 136–145)
SPECIFIC GRAVITY UA: >=1.03 (ref 1–1.03)
TOTAL PROTEIN: 6.4 G/DL (ref 6.4–8.2)
TROPONIN: 0.03 NG/ML
TROPONIN: 0.03 NG/ML
URINE REFLEX TO CULTURE: YES
URINE TYPE: ABNORMAL
UROBILINOGEN, URINE: 0.2 E.U./DL
WBC # BLD: 5.5 K/UL (ref 4–11)
WBC UA: >100 /HPF (ref 0–5)

## 2020-01-26 PROCEDURE — 80076 HEPATIC FUNCTION PANEL: CPT

## 2020-01-26 PROCEDURE — 81001 URINALYSIS AUTO W/SCOPE: CPT

## 2020-01-26 PROCEDURE — 99284 EMERGENCY DEPT VISIT MOD MDM: CPT

## 2020-01-26 PROCEDURE — 1200000000 HC SEMI PRIVATE

## 2020-01-26 PROCEDURE — 87077 CULTURE AEROBIC IDENTIFY: CPT

## 2020-01-26 PROCEDURE — 87086 URINE CULTURE/COLONY COUNT: CPT

## 2020-01-26 PROCEDURE — 36415 COLL VENOUS BLD VENIPUNCTURE: CPT

## 2020-01-26 PROCEDURE — 87186 SC STD MICRODIL/AGAR DIL: CPT

## 2020-01-26 PROCEDURE — 71045 X-RAY EXAM CHEST 1 VIEW: CPT

## 2020-01-26 PROCEDURE — 80048 BASIC METABOLIC PNL TOTAL CA: CPT

## 2020-01-26 PROCEDURE — 84484 ASSAY OF TROPONIN QUANT: CPT

## 2020-01-26 PROCEDURE — 84443 ASSAY THYROID STIM HORMONE: CPT

## 2020-01-26 PROCEDURE — 85025 COMPLETE CBC W/AUTO DIFF WBC: CPT

## 2020-01-26 PROCEDURE — 93005 ELECTROCARDIOGRAM TRACING: CPT | Performed by: PHYSICIAN ASSISTANT

## 2020-01-26 PROCEDURE — 6370000000 HC RX 637 (ALT 250 FOR IP): Performed by: PHYSICIAN ASSISTANT

## 2020-01-26 RX ORDER — ACETAMINOPHEN 325 MG/1
650 TABLET ORAL EVERY 4 HOURS PRN
Status: DISCONTINUED | OUTPATIENT
Start: 2020-01-26 | End: 2020-01-28 | Stop reason: HOSPADM

## 2020-01-26 RX ORDER — SODIUM CHLORIDE 9 MG/ML
INJECTION, SOLUTION INTRAVENOUS CONTINUOUS
Status: DISCONTINUED | OUTPATIENT
Start: 2020-01-26 | End: 2020-01-27

## 2020-01-26 RX ORDER — SIMVASTATIN 20 MG
20 TABLET ORAL NIGHTLY
Status: DISCONTINUED | OUTPATIENT
Start: 2020-01-26 | End: 2020-01-28 | Stop reason: HOSPADM

## 2020-01-26 RX ORDER — FAMOTIDINE 20 MG/1
20 TABLET, FILM COATED ORAL DAILY
Status: DISCONTINUED | OUTPATIENT
Start: 2020-01-27 | End: 2020-01-28 | Stop reason: HOSPADM

## 2020-01-26 RX ORDER — CEPHALEXIN 500 MG/1
500 CAPSULE ORAL ONCE
Status: COMPLETED | OUTPATIENT
Start: 2020-01-26 | End: 2020-01-26

## 2020-01-26 RX ORDER — ASPIRIN 81 MG/1
81 TABLET ORAL DAILY
Status: DISCONTINUED | OUTPATIENT
Start: 2020-01-27 | End: 2020-01-28 | Stop reason: HOSPADM

## 2020-01-26 RX ORDER — SODIUM CHLORIDE 0.9 % (FLUSH) 0.9 %
10 SYRINGE (ML) INJECTION EVERY 12 HOURS SCHEDULED
Status: DISCONTINUED | OUTPATIENT
Start: 2020-01-26 | End: 2020-01-28 | Stop reason: HOSPADM

## 2020-01-26 RX ORDER — SODIUM CHLORIDE 0.9 % (FLUSH) 0.9 %
10 SYRINGE (ML) INJECTION PRN
Status: DISCONTINUED | OUTPATIENT
Start: 2020-01-26 | End: 2020-01-28 | Stop reason: HOSPADM

## 2020-01-26 RX ORDER — ONDANSETRON 2 MG/ML
4 INJECTION INTRAMUSCULAR; INTRAVENOUS EVERY 6 HOURS PRN
Status: DISCONTINUED | OUTPATIENT
Start: 2020-01-26 | End: 2020-01-28 | Stop reason: HOSPADM

## 2020-01-26 RX ORDER — AMLODIPINE BESYLATE 10 MG/1
10 TABLET ORAL DAILY
Status: DISCONTINUED | OUTPATIENT
Start: 2020-01-27 | End: 2020-01-28 | Stop reason: HOSPADM

## 2020-01-26 RX ADMIN — CEPHALEXIN 500 MG: 500 CAPSULE ORAL at 17:14

## 2020-01-26 ASSESSMENT — ENCOUNTER SYMPTOMS
PHOTOPHOBIA: 0
ABDOMINAL PAIN: 0
COUGH: 0
RHINORRHEA: 0
NAUSEA: 0
SHORTNESS OF BREATH: 0
BACK PAIN: 0
CHEST TIGHTNESS: 0
WHEEZING: 0
EYE REDNESS: 0
VOMITING: 0

## 2020-01-26 ASSESSMENT — PAIN SCALES - GENERAL: PAINLEVEL_OUTOF10: 0

## 2020-01-26 NOTE — ED PROVIDER NOTES
[facility] ENCOUNTER          PHYSICIAN ASSISTANT NOTE       Date of evaluation: 1/26/2020    Chief Complaint     Dysuria      History of Present Illness     Eagle Becker is a 80 y.o. female with a history of CAD, hyperlipidemia, hypertension presents today with confusion and bowel habit changes. Patient denies any complaints. She states that she has been sitting on the toilet more often recently because of some loose stool but otherwise has no abdominal pain, nausea, vomiting, fever, chest pain, cough, or burning when she pees. Her caregiver says otherwise. She states that each night for the past several nights the patient is sitting for long time in the toilet endorsing burning when she urinates. At times she seems more confused than usual.  She still would ambulate and perform her daily activities at her baseline. No recent falls or trauma. Review of Systems     As documented in the HPI, otherwise all other systems were reviewed and were negative. Past Medical, Surgical, Family, and Social History     She has a past medical history of CAD (coronary artery disease), Hiatal hernia, Hyperlipidemia, Hypertension, and Macular degeneration of both eyes. She has a past surgical history that includes Diagnostic Cardiac Cath Lab Procedure; Cholecystectomy; Appendectomy; Tonsillectomy and adenoidectomy; joint replacement; bladder repair (Bladder tuck); bladder suspension (2008); cardiovascular stress test (8/12); and doppler echocardiography (10/13). Her family history includes Heart Attack in her father and mother; Heart Disease in her father and mother; Other in her daughter. She reports that she has quit smoking. She has never used smokeless tobacco. She reports that she does not drink alcohol or use drugs.     Medications     Previous Medications    AMLODIPINE (NORVASC) 10 MG TABLET    1 TABLET BY MOUTH EVERY MORNING    ASPIRIN 81 MG EC TABLET    Take 81 mg by mouth daily.      BENAZEPRIL (LOTENSIN) 5 MG TABLET    Take 1 tablet by mouth daily    DONEPEZIL (ARICEPT) 10 MG TABLET    2 qd    ELIQUIS 2.5 MG TABS TABLET    1 TABLET BY MOUTH TWICE DAILY    FAMOTIDINE (PEPCID) 20 MG TABLET    TAKE 1 TABLET BY MOUTH DAILY    METOPROLOL TARTRATE (LOPRESSOR) 25 MG TABLET    Take 0.5 tablets by mouth 2 times daily    SIMVASTATIN (ZOCOR) 20 MG TABLET    1 TABLET BY MOUTH AT BEDTIME * NO GRAPEFRUIT *    VITAMIN B-12 (CYANOCOBALAMIN) 1000 MCG TABLET    Take 1 tablet by mouth daily       Allergies     She is allergic to pcn [penicillins] and sulfa antibiotics. Physical Exam     INITIAL VITALS: BP: (!) 112/52, Temp: 97.7 °F (36.5 °C), Pulse: 70, Resp: 28, SpO2: 100 %     General: Pleasant, humorous, elderly female    HEENT:  Normocephalic, atraumatic. Pupils equal, sclera white. Handling secretions without difficulty. Moist mucous membranes. Neck: No meningismus. Trachea midline    Pulmonary: Respirations even. Non labored. No tachypnea. Good air movement throughout    Cardiac: Chest symmetrical and non-tender on palpation of chest wall. RRR. no M/R/G. Abdomen:  Non-distended. Bowel sounds present in all quadrants. Non rigid and non tender to palpation. Musculoskeletal:  Ambulates under own control. Atraumatic exam with no focal swelling or tenderness. No peripheral edema. Strength 5/5 in all four extremities. Neuro:  Alert and oriented x 3. CN II - XII grossly intact. Moves all extremities spontaneously.     Vascular:  2+ peripheral pulses in bilateral upper and lower extremities      Skin:  Warm and well perfused without rashes or lesions    Psych:  Appropriate mood and affect        Diagnostic Results     EKG   Interpreted in conjunction with emergency department physician No att. providers found  Indication: Confusion  Impression: Normal sinus rhythm, no acute ischemic findings, biphasic T waves in the lateral chest leads    RADIOLOGY:  XR CHEST PORTABLE   Final Result Mild left basilar opacity is predominantly related to a large hiatal/diaphragmatic hernia. No definite acute airspace disease.           LABS:   Results for orders placed or performed during the hospital encounter of 01/26/20   Urinalysis Reflex to Culture   Result Value Ref Range    Color, UA Yellow Straw/Yellow    Clarity, UA CLOUDY (A) Clear    Glucose, Ur Negative Negative mg/dL    Bilirubin Urine Negative Negative    Ketones, Urine TRACE (A) Negative mg/dL    Specific Gravity, UA >=1.030 1.005 - 1.030    Blood, Urine SMALL (A) Negative    pH, UA 6.0 5.0 - 8.0    Protein, UA 30 (A) Negative mg/dL    Urobilinogen, Urine 0.2 <2.0 E.U./dL    Nitrite, Urine POSITIVE (A) Negative    Leukocyte Esterase, Urine MODERATE (A) Negative    Microscopic Examination YES     Urine Type Voided     Urine Reflex to Culture Yes    Basic Metabolic Panel w/ Reflex to MG   Result Value Ref Range    Sodium 141 136 - 145 mmol/L    Potassium reflex Magnesium 4.3 3.5 - 5.1 mmol/L    Chloride 102 99 - 110 mmol/L    CO2 28 21 - 32 mmol/L    Anion Gap 11 3 - 16    Glucose 94 70 - 99 mg/dL    BUN 16 7 - 20 mg/dL    CREATININE 0.8 0.6 - 1.2 mg/dL    GFR Non-African American >60 >60    GFR African American >60 >60    Calcium 9.4 8.3 - 10.6 mg/dL   CBC Auto Differential   Result Value Ref Range    WBC 5.5 4.0 - 11.0 K/uL    RBC 3.43 (L) 4.00 - 5.20 M/uL    Hemoglobin 10.0 (L) 12.0 - 16.0 g/dL    Hematocrit 30.5 (L) 36.0 - 48.0 %    MCV 88.8 80.0 - 100.0 fL    MCH 29.2 26.0 - 34.0 pg    MCHC 32.8 31.0 - 36.0 g/dL    RDW 15.0 12.4 - 15.4 %    Platelets 459 877 - 252 K/uL    MPV 7.9 5.0 - 10.5 fL    Neutrophils % 80.2 %    Lymphocytes % 9.1 %    Monocytes % 9.1 %    Eosinophils % 0.7 %    Basophils % 0.9 %    Neutrophils Absolute 4.4 1.7 - 7.7 K/uL    Lymphocytes Absolute 0.5 (L) 1.0 - 5.1 K/uL    Monocytes Absolute 0.5 0.0 - 1.3 K/uL    Eosinophils Absolute 0.0 0.0 - 0.6 K/uL    Basophils Absolute 0.0 0.0 - 0.2 K/uL   Troponin   Result Value Ref Range    Troponin 0.03 (H) <0.01 ng/mL   Microscopic Urinalysis   Result Value Ref Range    WBC, UA >100 (A) 0 - 5 /HPF    RBC, UA None seen 0 - 2 /HPF    Epi Cells 3-5 /HPF    Bacteria, UA 4+ (A) /HPF   Troponin   Result Value Ref Range    Troponin 0.03 (H) <0.01 ng/mL       ED BEDSIDE ULTRASOUND:      RECENT VITALS:  BP: (!) 118/59, Temp: 97.7 °F (36.5 °C), Pulse: 86, Resp: 23, SpO2: 96 %     Procedures         ED Course     Nursing Notes, Past Medical Hx, Past Surgical Hx, Social Hx, Allergies, and Family Hx were reviewed. The patient was given the following medications:  Orders Placed This Encounter   Medications    cephALEXin (KEFLEX) capsule 500 mg       CONSULTS:  Jessy Betts / ALPHONSE / Bubba Trent is a 80 y.o. female with a history of CAD, hyperlipidemia, hypertension presents today with confusion and bowel habit changes of the past several days. According to her caregiver she has endorse some dysuria. She is mostly confused in the morning and nighttime. It appears this is an acute on chronic course for her confusion. On examination she appears well, humorous, pleasant, with a nonfocal neurologic exam.  Benign abdomen. Laboratory work notable for an evident UTI with positive nitrite, leukocyte Estrace, greater than 100 red-white blood cells. Secondary laboratory evaluation for possible contributing factors to her altered mental status was also initiated, notable for troponin of 0.03×2. Her initial EKG showed some biphasic T waves which resolved after 3 hours. At no time is she endorsed any chest pain. Her chest x-ray is clear. I discussed the case with her daughter/POA. They feel more comfortable monitoring the patient overnight for resolution of her elevated troponins. We will continue to treat her urinary tract infection with oral Keflex.   Discussed the case with her PCP who agrees for admission via the resident. This patient was also evaluated by the attending physician. All care plans were discussed and agreed upon. Clinical Impression     1. Urinary tract infection without hematuria, site unspecified    2. Elevated troponin    3. Confusion        Disposition     PATIENT REFERRED TO:  No follow-up provider specified.     DISCHARGE MEDICATIONS:  New Prescriptions    No medications on file       DISPOSITION Decision To Admit 01/26/2020 07:41:29 PM       Eulalia Arnett PA-C  01/26/20 2008

## 2020-01-27 LAB
ANION GAP SERPL CALCULATED.3IONS-SCNC: 14 MMOL/L (ref 3–16)
BASOPHILS ABSOLUTE: 0 K/UL (ref 0–0.2)
BASOPHILS RELATIVE PERCENT: 0.6 %
BUN BLDV-MCNC: 14 MG/DL (ref 7–20)
CALCIUM SERPL-MCNC: 8.9 MG/DL (ref 8.3–10.6)
CHLORIDE BLD-SCNC: 104 MMOL/L (ref 99–110)
CO2: 25 MMOL/L (ref 21–32)
CREAT SERPL-MCNC: 0.9 MG/DL (ref 0.6–1.2)
EKG ATRIAL RATE: 62 BPM
EKG ATRIAL RATE: 64 BPM
EKG DIAGNOSIS: NORMAL
EKG DIAGNOSIS: NORMAL
EKG P AXIS: 78 DEGREES
EKG P AXIS: 82 DEGREES
EKG P-R INTERVAL: 148 MS
EKG P-R INTERVAL: 172 MS
EKG Q-T INTERVAL: 418 MS
EKG Q-T INTERVAL: 418 MS
EKG QRS DURATION: 68 MS
EKG QRS DURATION: 74 MS
EKG QTC CALCULATION (BAZETT): 424 MS
EKG QTC CALCULATION (BAZETT): 431 MS
EKG R AXIS: 70 DEGREES
EKG R AXIS: 75 DEGREES
EKG T AXIS: 74 DEGREES
EKG T AXIS: 84 DEGREES
EKG VENTRICULAR RATE: 62 BPM
EKG VENTRICULAR RATE: 64 BPM
EOSINOPHILS ABSOLUTE: 0.1 K/UL (ref 0–0.6)
EOSINOPHILS RELATIVE PERCENT: 1.5 %
GFR AFRICAN AMERICAN: >60
GFR NON-AFRICAN AMERICAN: 58
GLUCOSE BLD-MCNC: 90 MG/DL (ref 70–99)
HCT VFR BLD CALC: 30.5 % (ref 36–48)
HEMOGLOBIN: 9.9 G/DL (ref 12–16)
LYMPHOCYTES ABSOLUTE: 0.4 K/UL (ref 1–5.1)
LYMPHOCYTES RELATIVE PERCENT: 7.2 %
MCH RBC QN AUTO: 29.1 PG (ref 26–34)
MCHC RBC AUTO-ENTMCNC: 32.5 G/DL (ref 31–36)
MCV RBC AUTO: 89.4 FL (ref 80–100)
MONOCYTES ABSOLUTE: 0.3 K/UL (ref 0–1.3)
MONOCYTES RELATIVE PERCENT: 5.7 %
NEUTROPHILS ABSOLUTE: 5.1 K/UL (ref 1.7–7.7)
NEUTROPHILS RELATIVE PERCENT: 85 %
PDW BLD-RTO: 15.1 % (ref 12.4–15.4)
PLATELET # BLD: 275 K/UL (ref 135–450)
PMV BLD AUTO: 8.2 FL (ref 5–10.5)
POTASSIUM REFLEX MAGNESIUM: 4.2 MMOL/L (ref 3.5–5.1)
RBC # BLD: 3.41 M/UL (ref 4–5.2)
SODIUM BLD-SCNC: 143 MMOL/L (ref 136–145)
TROPONIN: <0.01 NG/ML
TSH SERPL DL<=0.05 MIU/L-ACNC: 6.07 UIU/ML (ref 0.27–4.2)
WBC # BLD: 5.9 K/UL (ref 4–11)

## 2020-01-27 PROCEDURE — 97162 PT EVAL MOD COMPLEX 30 MIN: CPT

## 2020-01-27 PROCEDURE — 6360000002 HC RX W HCPCS: Performed by: STUDENT IN AN ORGANIZED HEALTH CARE EDUCATION/TRAINING PROGRAM

## 2020-01-27 PROCEDURE — 6370000000 HC RX 637 (ALT 250 FOR IP): Performed by: STUDENT IN AN ORGANIZED HEALTH CARE EDUCATION/TRAINING PROGRAM

## 2020-01-27 PROCEDURE — 97166 OT EVAL MOD COMPLEX 45 MIN: CPT

## 2020-01-27 PROCEDURE — 85025 COMPLETE CBC W/AUTO DIFF WBC: CPT

## 2020-01-27 PROCEDURE — 97535 SELF CARE MNGMENT TRAINING: CPT

## 2020-01-27 PROCEDURE — 99222 1ST HOSP IP/OBS MODERATE 55: CPT | Performed by: HOSPITALIST

## 2020-01-27 PROCEDURE — 80048 BASIC METABOLIC PNL TOTAL CA: CPT

## 2020-01-27 PROCEDURE — 2580000003 HC RX 258: Performed by: STUDENT IN AN ORGANIZED HEALTH CARE EDUCATION/TRAINING PROGRAM

## 2020-01-27 PROCEDURE — 97530 THERAPEUTIC ACTIVITIES: CPT

## 2020-01-27 PROCEDURE — 92610 EVALUATE SWALLOWING FUNCTION: CPT

## 2020-01-27 PROCEDURE — 97116 GAIT TRAINING THERAPY: CPT

## 2020-01-27 PROCEDURE — 84484 ASSAY OF TROPONIN QUANT: CPT

## 2020-01-27 PROCEDURE — 36415 COLL VENOUS BLD VENIPUNCTURE: CPT

## 2020-01-27 PROCEDURE — 2500000003 HC RX 250 WO HCPCS: Performed by: STUDENT IN AN ORGANIZED HEALTH CARE EDUCATION/TRAINING PROGRAM

## 2020-01-27 PROCEDURE — 1200000000 HC SEMI PRIVATE

## 2020-01-27 RX ORDER — DIAPER,BRIEF,INFANT-TODD,DISP
EACH MISCELLANEOUS 2 TIMES DAILY
Status: DISCONTINUED | OUTPATIENT
Start: 2020-01-27 | End: 2020-01-28 | Stop reason: HOSPADM

## 2020-01-27 RX ADMIN — MICONAZOLE NITRATE: 20 POWDER TOPICAL at 11:43

## 2020-01-27 RX ADMIN — METOPROLOL TARTRATE 12.5 MG: 25 TABLET ORAL at 09:36

## 2020-01-27 RX ADMIN — MICONAZOLE NITRATE: 20 POWDER TOPICAL at 21:37

## 2020-01-27 RX ADMIN — SIMVASTATIN 20 MG: 20 TABLET, FILM COATED ORAL at 21:36

## 2020-01-27 RX ADMIN — METOPROLOL TARTRATE 12.5 MG: 25 TABLET ORAL at 22:13

## 2020-01-27 RX ADMIN — HYDROCORTISONE: 1 CREAM TOPICAL at 21:37

## 2020-01-27 RX ADMIN — Medication 10 ML: at 01:31

## 2020-01-27 RX ADMIN — SIMVASTATIN 20 MG: 20 TABLET, FILM COATED ORAL at 01:31

## 2020-01-27 RX ADMIN — CEFTRIAXONE 1 G: 1 INJECTION, POWDER, FOR SOLUTION INTRAMUSCULAR; INTRAVENOUS at 22:20

## 2020-01-27 RX ADMIN — HYDROCORTISONE: 1 CREAM TOPICAL at 11:44

## 2020-01-27 RX ADMIN — ASPIRIN 81 MG: 81 TABLET, COATED ORAL at 09:36

## 2020-01-27 RX ADMIN — CEFTRIAXONE 1 G: 1 INJECTION, POWDER, FOR SOLUTION INTRAMUSCULAR; INTRAVENOUS at 01:32

## 2020-01-27 RX ADMIN — Medication 10 ML: at 21:36

## 2020-01-27 RX ADMIN — METOPROLOL TARTRATE 12.5 MG: 25 TABLET ORAL at 01:39

## 2020-01-27 RX ADMIN — AMLODIPINE BESYLATE 10 MG: 10 TABLET ORAL at 09:36

## 2020-01-27 RX ADMIN — SODIUM CHLORIDE: 9 INJECTION, SOLUTION INTRAVENOUS at 01:30

## 2020-01-27 RX ADMIN — ACETAMINOPHEN 325 MG: 325 TABLET ORAL at 05:43

## 2020-01-27 RX ADMIN — APIXABAN 2.5 MG: 2.5 TABLET, FILM COATED ORAL at 01:31

## 2020-01-27 RX ADMIN — APIXABAN 2.5 MG: 2.5 TABLET, FILM COATED ORAL at 21:37

## 2020-01-27 RX ADMIN — APIXABAN 2.5 MG: 2.5 TABLET, FILM COATED ORAL at 09:36

## 2020-01-27 RX ADMIN — FAMOTIDINE 20 MG: 20 TABLET ORAL at 09:36

## 2020-01-27 ASSESSMENT — PAIN DESCRIPTION - ONSET: ONSET: ON-GOING

## 2020-01-27 ASSESSMENT — PAIN DESCRIPTION - LOCATION: LOCATION: BACK

## 2020-01-27 ASSESSMENT — PAIN DESCRIPTION - PAIN TYPE: TYPE: CHRONIC PAIN

## 2020-01-27 ASSESSMENT — PAIN SCALES - GENERAL
PAINLEVEL_OUTOF10: 0
PAINLEVEL_OUTOF10: 5
PAINLEVEL_OUTOF10: 0

## 2020-01-27 ASSESSMENT — PAIN DESCRIPTION - DESCRIPTORS: DESCRIPTORS: ACHING

## 2020-01-27 ASSESSMENT — PAIN DESCRIPTION - PROGRESSION: CLINICAL_PROGRESSION: GRADUALLY WORSENING

## 2020-01-27 ASSESSMENT — PAIN - FUNCTIONAL ASSESSMENT: PAIN_FUNCTIONAL_ASSESSMENT: ACTIVITIES ARE NOT PREVENTED

## 2020-01-27 ASSESSMENT — PAIN DESCRIPTION - FREQUENCY: FREQUENCY: CONTINUOUS

## 2020-01-27 ASSESSMENT — PAIN DESCRIPTION - ORIENTATION: ORIENTATION: MID

## 2020-01-27 NOTE — PROGRESS NOTES
am...\" pt was able to state she was in the hospital but unable to report which one. Pt denied difficulty with swallowing. Oral- Pt has 4 upper front teeth- reported having an upper partial plate, but not found in the pt's room. ROM of oral structures was [facility]. Pt had no difficulty closing lips around spoon or drawing liquid up a straw. Pt had no dificulty with mastication with solids. There was no anterior spillage or oral residue noted with any consistency. Pharyngeal-   Pt able to initiate swallow without difficulty with laryngeal movement observed with all trials. Vocal quality remained clear throughout. Pt noted to have intermittent delayed coughing following 2 trials of thin- pt consumed 4 ounces total by single and successive swallows, but with no other consistencies. When discussed concern with intermittent coughing with thin, pt reported after first time she coughed it was due to the cracker crumbs from her mouth and the second episode she reported it was due to the water being cold. Attempted 3 oz water test, but pt was unable to fully follow the directions to complete. Given clear lung status and normal MBS in March, will continue to assess for need for repeat MBS. Dysphagia Diagnosis: Suspected needs further assessment  Dysphagia Outcome Severity Scale: Level 5: Mild dysphagia- Distant supervision. May need one diet consistency restricted     Treatment Plan  Requires SLP Intervention: Yes  Duration/Frequency of Treatment: 2-4x  D/C Recommendations:  To be determined       Recommended Diet and Intervention  Diet Solids Recommendation: Dental Soft  Liquid Consistency Recommendation: Thin-Make NPO if  ANY s/s aspiration emerge and alert SLP  Will continue to assess for need for repeat MBS  Recommended Form of Meds: PO  Recommendations: Dysphagia treatment  Therapeutic Interventions: Diet tolerance monitoring;Patient/Family education    Compensatory Swallowing Strategies  Compensatory Swallowing Strategies: Upright as possible for all oral intake;Small bites/sips    Treatment/Goals  1- The patient will tolerate recommended diet without observed clinical signs of aspiration    2- The pt/family will demonstrate understanding of swallowing recommendations and concerns. 1/27- The pt was educated to purpose of the visit,  concerns for aspiration, plan to continue to assess swallowing and monitor for need for repeat MBS, swallowing strategies, diet recommendations and possibility of being made NPO if s/s aspiration emerge. The pt stated comprehension, but would benefit from reinforcement con't goal      General  Chart Reviewed: Yes  Behavior/Cognition: Alert; Cooperative;Pleasant mood;Confused  Respiratory Status: Room air  Communication Observation: Functional  Follows Directions: Simple  Dentition: Some missing teeth(only has 4 upper teeth- has top partial but not present)  Patient Positioning: Upright in bed  Baseline Vocal Quality: Normal  Volitional Cough: Strong  Prior Dysphagia History: pt was last seen in March 2019- had MBS 3/21/19- which was [facility]- recommended dental soft with thin liquids   Consistencies Administered: Reg solid; Dysphagia Pureed (Dysphagia I); Thin - straw; Thin - cup; Ice Chips           Vision/Hearing  Vision  Vision: Impaired  Vision Exceptions: Wears glasses at all times  Hearing  Hearing: Exceptions to [facility]  Hearing Exceptions: Hard of hearing/hearing concerns    Oral Motor Deficits  Oral/Motor  Oral Motor: Within functional limits   Pt has 4 upper front teeth- reported having an upper partial plate, but not found in the pt's room. ROM of oral structures was [facility]. Oral Phase Dysfunction    Pt had no difficulty closing lips around spoon or drawing liquid up a straw. Pt had no dificulty with mastication with solids. There was no anterior spillage or oral residue noted with any consistency.         Indicators of Pharyngeal Phase Dysfunction     Pt able to initiate swallow without

## 2020-01-27 NOTE — H&P
HISTORY AND PHYSICAL  Internal Medicine Resident PGY-3    Admit Date: 1/26/2020                                                           Code:DNR-CCA  PCP: Rhea Pineda MD                                  CC: Altered mental status. HISTORYOF PRESENT ILLNESS:   Mesha Long is a 80 y.o. female with history of CAD, HTN, pAfib, & dementia who presents with altered mental status. Per daughter and patient's caretaker patient was reportedly confused and agitated earlier. She was complaining of burning with urination and loose stools. Patient is poor historian and states currently that nothing is wrong with her. She states she had a large bowel movement earlier. She denies having pain anywhere. Denies fever, chills, chest pain, SOB, abdominal pain, nausea, vomiting. PAST HISTORY:     Past Medical History:   Diagnosis Date    CAD (coronary artery disease)     Hiatal hernia     Hyperlipidemia     Hypertension     Macular degeneration of both eyes     wet - Dr. Maninder Triplett       Past Surgical History:   Procedure Laterality Date    APPENDECTOMY      BLADDER REPAIR  Bladder tuck    Bladder tuck    BLADDER SUSPENSION  2008    [facility] [place]    CARDIOVASCULAR STRESS TEST  8/12    negative - good EF    CHOLECYSTECTOMY      DIAGNOSTIC CARDIAC CATH LAB PROCEDURE      DOPPLER ECHOCARDIOGRAPHY  10/13    normal EF    JOINT REPLACEMENT      TONSILLECTOMY AND ADENOIDECTOMY         Social History:   The patient lives at the [address] Alcohol: no use  Illicit drugs: no use  Tobacco:  Former smoker    Family History:  Family History   Problem Relation Age of Onset    Heart Attack Mother     Heart Disease Mother     Heart Attack Father     Heart Disease Father     Other Daughter        MEDICATIONS:     No current facility-administered medications on file prior to encounter.       Current Outpatient Medications on File Prior to Encounter   Medication Sig Dispense Refill    ELIQUIS 2.5 MG TABS tablet 1 TABLET BY MOUTH TWICE DAILY 60 tablet 1    vitamin B-12 (CYANOCOBALAMIN) 1000 MCG tablet Take 1 tablet by mouth daily  3    donepezil (ARICEPT) 10 MG tablet 2 qd 60 tablet 5    amLODIPine (NORVASC) 10 MG tablet 1 TABLET BY MOUTH EVERY MORNING 30 tablet 3    simvastatin (ZOCOR) 20 MG tablet 1 TABLET BY MOUTH AT BEDTIME * NO GRAPEFRUIT * 30 tablet 5    benazepril (LOTENSIN) 5 MG tablet Take 1 tablet by mouth daily 90 tablet 3    famotidine (PEPCID) 20 MG tablet TAKE 1 TABLET BY MOUTH DAILY 90 tablet 2    metoprolol tartrate (LOPRESSOR) 25 MG tablet Take 0.5 tablets by mouth 2 times daily 60 tablet 3    aspirin 81 MG EC tablet Take 81 mg by mouth daily. Allergies: Allergies   Allergen Reactions    Pcn [Penicillins]      Patient tolerates Keflex    Sulfa Antibiotics        REVIEW OF SYSTEMS:       History obtained from chart review and the patient    Review of Systems   Constitutional: Negative for chills and fever. HENT: Negative for congestion and rhinorrhea. Eyes: Negative for photophobia, redness and visual disturbance. Respiratory: Negative for cough, chest tightness, shortness of breath and wheezing. Cardiovascular: Negative for chest pain, palpitations and leg swelling. Gastrointestinal: Negative for abdominal pain, nausea and vomiting. Genitourinary: Negative for dysuria and flank pain. Musculoskeletal: Negative for back pain and myalgias. Neurological: Negative for dizziness, syncope, weakness, light-headedness and headaches. PHYSICAL EXAM:       Vitals: BP (!) 103/52   Pulse 82   Temp 97.7 °F (36.5 °C) (Oral)   Resp 17   SpO2 96%     PhysicalExamination:     Physical Exam  Constitutional:       General: She is not in acute distress. HENT:      Head: Normocephalic and atraumatic. Eyes:      Extraocular Movements: Extraocular movements intact. Pupils: Pupils are equal, round, and reactive to light.    Neck:      Musculoskeletal: Normal range of motion and Follow up urine culture. Stool studies ordered. NPO pending SLP. PTOT. Urinary tract infection. Management as above. Elevated troponin - likely demand ischemia. No chest pain. Mild elevated troponin with ?abnormal EKG. Hx CAD on aspirin & statin. Recent lipid panel OK. Last Echo (7/16/18) G2DD, EF 55%, PASP 41. Plan:  Cont ASA/statin. Trend troponin. Chronic Problems:  Paroxysmal atrial fibrillation. Cont home BB. Cont home eliquis. Telemetry monitoring. Maintain K>4, Mg>2. Essential hypertension. Cont home meds as BP tolerates. Dementia.    Cont home Aricept      FEN: NPO  VTE Prophylaxis: On eliquis  Code Status: DNR-CCA    Disposition: Inpatient    This patient will be staffed with Dr. Carson Mcmillan.  -----------------------------  Camille Love MD  Internal Medicine, PGY-3  Contact via [employer]  8:55 PM

## 2020-01-27 NOTE — CARE COORDINATION
ADL's. Patient's daughter Malvin Skaggs, resides in [place] ([phone]). Patient has a UTI. Team treating with IV ABX and has urine cultures pending. Kvng Tadeo studies also noted. Therapy ordered. Family/Aides have provided transport back to [facility] in the past.     SW provided contact information to patient/family and placed information on white board in room should needs arise. No other needs noted at this time. The Plan for Transition of Care is related to the following treatment goals   Acute metabolic encephalopathy     The Patient and/or patient representative Patient/Aide was provided with a choice of provider and agrees with the discharge plan Yes    Freedom of choice list was provided with basic dialogue that supports the patient's individualized plan of care/goals and shares the quality data associated with the providers.  Yes    Care Transition patient: Yes    DANDRE Flannery  [facility]  Case Management Department  Ph: [phone]   Fax: [phone]

## 2020-01-27 NOTE — DISCHARGE SUMMARY
Hospital Medicine Discharge Summary    Patient ID: Iban Klein   Gender: female  : 1923   Age: 80 y.o. MRN: [id number]  Code Status: DNR-CCA ***   Patient's PCP: Agustin Howard MD    Admit Date: 2020     Discharge Date:   ***    Admitting Physician: Lima Lancaster MD     Discharge Physician: Binh Grigsby     Discharge Diagnoses:  Acute encephalopathy 2/2 UTI     Active Hospital Problems    Diagnosis Date Noted    Acute encephalopathy [G93.40] 2020       The patient was seen and examined on day of discharge and this discharge summary is in conjunction with any daily progress note from day of discharge. Hospital Course: Iban Klein is a 81 yo female with history of CAD, HTN, pAfib & dementia who presented with AMS. Per daughter and patient's caretaker the patient was reportedly confused and agitated. She was complaining of burning with urination and loose stools. U/A showed bacteruria with positive leukesterase and nitrite. Troponin's were elevated at 0.03. Urine cultures were taken. Pt was given a dose of keflex and started on rocephin. Speech evaluation was done. Pt was continued on her home medications for her pAfib, HTN and dementia. Pt returned to baseline mental status on admission. Repeat troponin's were negative. Pt will be dishcarged on. .. Disposition:  {DISPOSITIONS:429148424}    Physical Exam Performed:     BP (!) 107/55   Pulse 57   Temp 97.4 °F (36.3 °C) (Oral)   Resp 16   Ht 5' (1.524 m) Comment: per EMR review  Wt 112 lb 14 oz (51.2 kg)   SpO2 93%   BMI 22.04 kg/m²       General appearance:  No apparent distress, appears stated age and cooperative. HEENT:  Normal cephalic, atraumatic without obvious deformity. Pupils equal, round, and reactive to light. Extra ocular muscles intact. Conjunctivae/corneas clear. Neck: Supple, with full range of motion. No jugular venous distention. Trachea midline. Respiratory:  Normal respiratory effort.  Clear to auscultation, bilaterally without Rales/Wheezes/Rhonchi. Cardiovascular:  Regular rate and rhythm with normal S1/S2 without murmurs, rubs or gallops. Abdomen: Soft, non-tender, non-distended with normal bowel sounds. Musculoskeletal:  No clubbing, cyanosis or edema bilaterally. Full range of motion without deformity. Skin: Skin color, texture, turgor normal.  No rashes or lesions. Neurologic:  Neurovascularly intact without any focal sensory/motor deficits. Cranial nerves: II-XII intact, grossly non-focal.  Psychiatric:  Alert and oriented, thought content appropriate, normal insight  Capillary Refill: Brisk,< 3 seconds   Peripheral Pulses: +2 palpable, equal bilaterally       Labs: For convenience and continuity at follow-up the following most recent labs are provided:      CBC:    Lab Results   Component Value Date    WBC 5.9 01/27/2020    HGB 9.9 01/27/2020    HCT 30.5 01/27/2020     01/27/2020       Renal:    Lab Results   Component Value Date     01/27/2020    K 4.2 01/27/2020     01/27/2020    CO2 25 01/27/2020    BUN 14 01/27/2020    CREATININE 0.9 01/27/2020    CALCIUM 8.9 01/27/2020         Significant Diagnostic Studies    Radiology:   XR CHEST PORTABLE   Final Result      Mild left basilar opacity is predominantly related to a large hiatal/diaphragmatic hernia. No definite acute airspace disease.              Consults:     IP CONSULT TO PRIMARY CARE PROVIDER  IP CONSULT TO CASE MANAGEMENT    Disposition:  ***     Condition at Discharge: [facility] Patient Condition:[id number]}    Discharge Instructions/Follow-up:  ***    Code Status:  DNR-CCA ***    Activity: activity as tolerated    Diet: {diet:36211}      Discharge Medications:     Current Discharge Medication List           Details   ELIQUIS 2.5 MG TABS tablet 1 TABLET BY MOUTH TWICE DAILY  Qty: 60 tablet, Refills: 1      vitamin B-12 (CYANOCOBALAMIN) 1000 MCG tablet Take 1 tablet by mouth daily  Refills: 3      donepezil (ARICEPT) 10 MG tablet 2 qd  Qty: 60 tablet, Refills: 5      amLODIPine (NORVASC) 10 MG tablet 1 TABLET BY MOUTH EVERY MORNING  Qty: 30 tablet, Refills: 3      simvastatin (ZOCOR) 20 MG tablet 1 TABLET BY MOUTH AT BEDTIME * NO GRAPEFRUIT *  Qty: 30 tablet, Refills: 5      benazepril (LOTENSIN) 5 MG tablet Take 1 tablet by mouth daily  Qty: 90 tablet, Refills: 3      famotidine (PEPCID) 20 MG tablet TAKE 1 TABLET BY MOUTH DAILY  Qty: 90 tablet, Refills: 2      metoprolol tartrate (LOPRESSOR) 25 MG tablet Take 0.5 tablets by mouth 2 times daily  Qty: 60 tablet, Refills: 3      aspirin 81 MG EC tablet Take 81 mg by mouth daily. Time Spent on discharge is more than {Time; 15 min - 8 hours:18717} in the examination, evaluation, counseling and review of medications and discharge plan. Signed: Lindsey Givens   1/27/2020      Thank you Shraddha Paredes MD for the opportunity to be involved in this patient's care. If you have any questions or concerns please feel free to contact me at [phone]

## 2020-01-27 NOTE — PLAN OF CARE
Problem: Falls - Risk of:  Goal: Will remain free from falls  Description  Will remain free from falls  1/27/2020 1522 by Valdez Hernandez RN  Note:   She is a fall risk. Armband in place, door open, and bed alarm on. Call light in reach. Will monitor. Problem: Risk for Impaired Skin Integrity  Goal: Tissue integrity - skin and mucous membranes  Description  Structural intactness and normal physiological function of skin and  mucous membranes. 1/27/2020 1522 by Valdez Hernandez RN  Note:   Skin reddened to coccyx and mid spine. Mepilex dressings in place. Skin under right breast red, and under left breast pink. Hydrocortisone cream and miconazole powder applied. Heels elevated in bed. She repositions on her own. Will monitor. Problem: Urinary Elimination:  Goal: Signs and symptoms of infection will decrease  Description  Signs and symptoms of infection will decrease  1/27/2020 1522 by Valdez Hernandez RN  Note:   Voiding without difficulty, small amounts at a time. Wears pads for stress incontinence. On rocephin. Will monitor. Problem: Mental Status - Impaired:  Intervention: Assess signs and symptoms of altered mental status  Note:   She is alert to person, and sometimes the time. She is at her baseline. She wants to go home. Called and spoke with Vel Del Toro her caregiver. Patient felt better after speaking with her caregiver. She feels everybody knows what is going on now. Will monitor.

## 2020-01-27 NOTE — PROGRESS NOTES
Progress Note    Admit Date: 1/26/2020  Day: 2  Diet: DIET DENTAL SOFT;    CC: Altered mental status    Interval history: Pt admitted overnight for altered mental status, per daughter and caretaker patient was confused and agitated. Pt has dementia. Pt has no complaints this morning. AO, she wishes to go home. Denies dysuria, abdominal pain, diarrhea, chest pain, SOB. No BM since admission. Medications:     Scheduled Meds:   aspirin  81 mg Oral Daily    amLODIPine  10 mg Oral Daily    apixaban  2.5 mg Oral BID    metoprolol tartrate  12.5 mg Oral BID    simvastatin  20 mg Oral Nightly    famotidine  20 mg Oral Daily    sodium chloride flush  10 mL Intravenous 2 times per day    cefTRIAXone (ROCEPHIN) IV  1 g Intravenous Q24H     Continuous Infusions:   sodium chloride 75 mL/hr at 01/27/20 0130     PRN Meds:sodium chloride flush, magnesium hydroxide, ondansetron, acetaminophen    Objective:   Vitals:   T-max:  Patient Vitals for the past 8 hrs:   BP Temp Temp src Pulse Resp SpO2 Height Weight   01/27/20 0428 (!) 102/53 97.1 °F (36.2 °C) Oral 92 22 92 % -- --   01/27/20 0139 (!) 110/49 -- -- 63 -- -- 5' (1.524 m) 112 lb 14 oz (51.2 kg)       Intake/Output Summary (Last 24 hours) at 1/27/2020 0921  Last data filed at 1/27/2020 0548  Gross per 24 hour   Intake 451 ml   Output 100 ml   Net 351 ml       Review of Systems    Physical Exam  Constitutional:       Appearance: Normal appearance. HENT:      Head: Normocephalic and atraumatic. Nose: Nose normal.      Mouth/Throat:      Mouth: Mucous membranes are moist.      Pharynx: Oropharynx is clear. Eyes:      Extraocular Movements: Extraocular movements intact. Conjunctiva/sclera: Conjunctivae normal.      Pupils: Pupils are equal, round, and reactive to light. Neck:      Musculoskeletal: Normal range of motion. Cardiovascular:      Rate and Rhythm: Normal rate and regular rhythm. Heart sounds: Murmur present.    Pulmonary: Effort: Pulmonary effort is normal.      Breath sounds: Normal breath sounds. Abdominal:      General: Abdomen is flat. Bowel sounds are normal.      Palpations: Abdomen is soft. Musculoskeletal: Normal range of motion. Skin:     General: Skin is warm and dry. Neurological:      General: No focal deficit present. Mental Status: She is alert and oriented to person, place, and time. Psychiatric:         Mood and Affect: Mood normal.         LABS:    CBC:   Recent Labs     01/26/20 1644 01/27/20  0554   WBC 5.5 5.9   HGB 10.0* 9.9*   HCT 30.5* 30.5*    275   MCV 88.8 89.4     Renal:    Recent Labs     01/26/20 1644 01/27/20  0554    143   K 4.3 4.2    104   CO2 28 25   BUN 16 14   CREATININE 0.8 0.9   GLUCOSE 94 90   CALCIUM 9.4 8.9   ANIONGAP 11 14     Hepatic:   Recent Labs     01/26/20 1812   AST 15   ALT <5*   BILITOT <0.2   BILIDIR <0.2   PROT 6.4   LABALBU 3.6   ALKPHOS 69     Troponin:   Recent Labs     01/26/20 1644 01/26/20 1812 01/27/20  0554   TROPONINI 0.03* 0.03* <0.01     -----------------------------------------------------------------  RAD:   XR CHEST PORTABLE   Final Result      Mild left basilar opacity is predominantly related to a large hiatal/diaphragmatic hernia. No definite acute airspace disease. Assessment/Plan:   Fortino Issa is a 80 y.o. female, who was admitted with altered mental status.      Acute metabolic encephalopathy - 2/2 UTI, also delirium in setting of dementia. Patient c/o dysuria, questionable diarrhea, no BM since admission. U/A bacteriuria with +LE/+nitrite. Prior urine culture & sensitivities reviewed. Recent TSH OK.  - IV rocephin.   - f/u urine culture. - f/u Stool studies   - SLP-soft diet  - PT/OT     Urinary tract infection. Management as above.     Elevated troponin-resolved - likely demand ischemia. No chest pain. Mild elevated troponin with ?abnormal EKG. Hx CAD. Recent lipid panel OK.    Last Echo (7/16/18) G2DD, EF 55%, PASP 41.  - Cont ASA/statin. - trop <0.01     Paroxysmal atrial fibrillation.   - Cont home BB.   - Cont home eliquis. - Telemetry monitoring.   - Maintain K>4, Mg>2.     Essential hypertension.   - Cont home meds as BP tolerates.      Dementia. - Cont home Aricept     Code Status: DNR-CCA  FEN: DIET Dental Soft  PPX: Eliquis  DISPO: inpatient    [facility], [facility]-4  01/27/20  9:21 AM    This patient has been staffed and discussed with No att. providers found. Addendum to Resident H& P/Progress note:  I have personally seen,examined and evaluated the patient.  I have reviewed the current history, physical findings, labs and assessment and plan and agree with note as documented by resident MD ( Jennifer Paredes and Teagan Hutchins)      Anoop Roth MD, [address]

## 2020-01-27 NOTE — PROGRESS NOTES
Speech Language Pathology      Referral rec'd chart reviewed, bedside swallow complete. Recommend dental soft with thin liquids-Make NPO if s/s aspiration emerge and alert SLP. Discussed recommendations and concerns with RN, Thuy Ugalde. Full note to follow.       Amador Greene [address]  Speech-Language Pathologist  Pager [phone]

## 2020-01-27 NOTE — PROGRESS NOTES
Occupational Therapy   Occupational Therapy Initial Assessment  Date: 2020   Patient Name: Iban Klein  MRN: [id number]     : 1923    Date of Service: 2020  Assessment: Functional mobility and ADL participation decreased from baseline. Pt tolerated treatment session well. Pt able to perform ADLs with assist, and pt is from assisted living where she was receiving 24hr assist from caregivers. Recommend pt return home to assisted living facility with 24hr supervision, but would benefit from continued inpatient OT services until d/c. Discharge Recommendations:Lizzie Abreu scored a 19/24 on the AM-PAC ADL Inpatient form. Current research shows that an AM-PAC score of 18 or greater is typically associated with a discharge to the patient's home setting. Based on the patients AM-PAC score and their current ADL deficits, it is recommended that the patient have 2-3 sessions per week of Occupational Therapy at d/c to increase the patients independence. See assessment. OT Equipment Recommendations  Equipment Needed: No    Assessment   Performance deficits / Impairments: Decreased ADL status; Decreased functional mobility ; Decreased safe awareness;Decreased high-level IADLs;Decreased fine motor control;Decreased endurance  Assessment: Pt functional mobility and ADL participation decreased from baseline. Pt tolerated treatment session well. Pt from assisted living where she was receiving 24hr assist from caregivers. Recommend pt return home to assisted living facility with 24hr supervision, but would benefit from continued inpatient OT services until d/c.    Treatment Diagnosis: decreased activity tolerance; impaired ADLs and transfers   Decision Making: Medium Complexity  OT Education: OT Role;Plan of Care;Energy Conservation  REQUIRES OT FOLLOW UP: Yes  Activity Tolerance  Activity Tolerance: Patient Tolerated treatment well  Safety Devices  Safety Devices in place: Yes  Type of devices: Call light within reach; Left in chair;Nurse notified; Chair alarm in place             Treatment Diagnosis: decreased activity tolerance; impaired ADLs and transfers       Restrictions  Position Activity Restriction  Other position/activity restrictions: Up with Assist    Subjective   General  Chart Reviewed: Yes  Additional Pertinent Hx: 80 y.o. F admitted 1/26 for dysuria and altered mental status. CXR: (-) acute airspace disease. PMHx: HTN, hyperlipidemia, CAD, joint replacement  Family / Caregiver Present: No  Referring Practitioner: Brian Goodwin  Diagnosis: acute encephalopathy   Subjective  Subjective: Pt in chair upon arrival. Pt pleasant and agreeable to therapy after gentle encouragement   Patient Currently in Pain: Denies  Pain Assessment  Pain Assessment: 0-10(no c/o pain )    Social/Functional History  Social/Functional History  Lives With: Alone(has 24 hour caregiver)  Type of Home: Facility([facility])  Home Layout: One level  Home Access: Level entry  Bathroom Shower/Tub: Walk-in shower  Bathroom Toilet: Handicap height  Bathroom Equipment: Shower chair, Grab bars in shower  Bathroom Accessibility: Accessible  Home Equipment: Rolling walker(Pt thinks it's a RW but isn't sure. )  Receives Help From: Personal care attendant(has 24 hr assist)  ADL Assistance: Needs assistance  Homemaking Assistance: Needs assistance  Ambulation Assistance: Independent(with walker)  Transfer Assistance: Independent  Active : No  Occupation: Retired  Leisure & Hobbies: Reading  Additional Comments: Pt has 24 hour assist at facility, daughter lives in [facility]; sleeps in a recliner.         Objective   Vision: Impaired  Vision Exceptions: Wears glasses at all times  Hearing: Exceptions to [facility]  Hearing Exceptions: Hard of hearing/hearing concerns      Orientation  Overall Orientation Status: Impaired(oriented to self, month, situation (knew she was in hospital but not which one) )        Balance  Sitting Balance: Inpatient CMS 0-100% Score: 42.8 (01/27/20 1320)  ADL Inpatient CMS G-Code Modifier : CK (01/27/20 1320)    Goals  Short term goals  Time Frame for Short term goals: by d/c   Short term goal 1: complete toilet transfer with CGA - not met   Short term goal 2: complete grooming task at sink with SBA - not met   Short term goal 3: participate in bathing routine - not met   Patient Goals   Patient goals : to go home       Therapy Time   Individual Concurrent Group Co-treatment   Time In 1142         Time Out 1221         Minutes 39         Timed Code Tx Min:24  Total Tx Time:39    Therapist was present, directed the patient's care, made skilled judgment, and was responsible for assessment and treatment of the patient.       Cookie Rashid, s/OT   Dina Valdez OTR/L #[id number]

## 2020-01-27 NOTE — PLAN OF CARE
Problem: Falls - Risk of:  Goal: Will remain free from falls  Description  Will remain free from falls  Outcome: Ongoing  Note:   At high risk for falls per Letha Schultze scale. Oriented to self only. Forgetful. Needed items in reach. Fall precautions in place. Problem: Risk for Impaired Skin Integrity  Goal: Tissue integrity - skin and mucous membranes  Description  Structural intactness and normal physiological function of skin and  mucous membranes. Outcome: Ongoing  Note:   Herman skin scale score 17. Noted redness and small scabbed abrasion coccyx. Also noted redness rikki area and lower calves bilat. Pt states she cannot lay on her side due to kyphosis. Sacral heart applied and Mepiles to pressure point mid back along spine. Will assist with rikki care. Will continue to monitor and plan of care. Problem: Urinary Elimination:  Goal: Signs and symptoms of infection will decrease  Description  Signs and symptoms of infection will decrease  Outcome: Ongoing  Note:   Voided clear yellow urine per speci hat. Denies burning or pain on urination. Started on IV Rocephin. Will continue to monitor and plan of care.

## 2020-01-27 NOTE — PROGRESS NOTES
Received from ED per stretcher. Awake and alert. Oriented to person and some aspects of situation. Disoriented to place and time. Forgetful. She is pleasant and follows directions. Denies pain. VSS. Oriented to room. Needed items in reach. Fall precautions in place. Will continue to monitor.

## 2020-01-28 VITALS
HEIGHT: 60 IN | DIASTOLIC BLOOD PRESSURE: 49 MMHG | HEART RATE: 60 BPM | RESPIRATION RATE: 20 BRPM | TEMPERATURE: 97.7 F | SYSTOLIC BLOOD PRESSURE: 102 MMHG | OXYGEN SATURATION: 92 % | WEIGHT: 112.43 LBS | BODY MASS INDEX: 22.07 KG/M2

## 2020-01-28 LAB
ANION GAP SERPL CALCULATED.3IONS-SCNC: 13 MMOL/L (ref 3–16)
BASOPHILS ABSOLUTE: 0 K/UL (ref 0–0.2)
BASOPHILS RELATIVE PERCENT: 0.7 %
BUN BLDV-MCNC: 16 MG/DL (ref 7–20)
CALCIUM SERPL-MCNC: 8.9 MG/DL (ref 8.3–10.6)
CHLORIDE BLD-SCNC: 106 MMOL/L (ref 99–110)
CO2: 23 MMOL/L (ref 21–32)
CREAT SERPL-MCNC: 0.8 MG/DL (ref 0.6–1.2)
EOSINOPHILS ABSOLUTE: 0.1 K/UL (ref 0–0.6)
EOSINOPHILS RELATIVE PERCENT: 2.9 %
GFR AFRICAN AMERICAN: >60
GFR NON-AFRICAN AMERICAN: >60
GLUCOSE BLD-MCNC: 93 MG/DL (ref 70–99)
HCT VFR BLD CALC: 28.8 % (ref 36–48)
HEMOGLOBIN: 9.4 G/DL (ref 12–16)
LYMPHOCYTES ABSOLUTE: 0.5 K/UL (ref 1–5.1)
LYMPHOCYTES RELATIVE PERCENT: 11.2 %
MCH RBC QN AUTO: 29.4 PG (ref 26–34)
MCHC RBC AUTO-ENTMCNC: 32.7 G/DL (ref 31–36)
MCV RBC AUTO: 89.9 FL (ref 80–100)
MONOCYTES ABSOLUTE: 0.3 K/UL (ref 0–1.3)
MONOCYTES RELATIVE PERCENT: 7.4 %
NEUTROPHILS ABSOLUTE: 3.5 K/UL (ref 1.7–7.7)
NEUTROPHILS RELATIVE PERCENT: 77.8 %
ORGANISM: ABNORMAL
PDW BLD-RTO: 14.7 % (ref 12.4–15.4)
PLATELET # BLD: 255 K/UL (ref 135–450)
PMV BLD AUTO: 8 FL (ref 5–10.5)
POTASSIUM REFLEX MAGNESIUM: 4.4 MMOL/L (ref 3.5–5.1)
RBC # BLD: 3.21 M/UL (ref 4–5.2)
SODIUM BLD-SCNC: 142 MMOL/L (ref 136–145)
URINE CULTURE, ROUTINE: ABNORMAL
WBC # BLD: 4.4 K/UL (ref 4–11)

## 2020-01-28 PROCEDURE — 97530 THERAPEUTIC ACTIVITIES: CPT

## 2020-01-28 PROCEDURE — 99238 HOSP IP/OBS DSCHRG MGMT 30/<: CPT | Performed by: HOSPITALIST

## 2020-01-28 PROCEDURE — 2580000003 HC RX 258: Performed by: STUDENT IN AN ORGANIZED HEALTH CARE EDUCATION/TRAINING PROGRAM

## 2020-01-28 PROCEDURE — 85025 COMPLETE CBC W/AUTO DIFF WBC: CPT

## 2020-01-28 PROCEDURE — 97116 GAIT TRAINING THERAPY: CPT

## 2020-01-28 PROCEDURE — 6370000000 HC RX 637 (ALT 250 FOR IP): Performed by: STUDENT IN AN ORGANIZED HEALTH CARE EDUCATION/TRAINING PROGRAM

## 2020-01-28 PROCEDURE — 36415 COLL VENOUS BLD VENIPUNCTURE: CPT

## 2020-01-28 PROCEDURE — 80048 BASIC METABOLIC PNL TOTAL CA: CPT

## 2020-01-28 RX ORDER — CIPROFLOXACIN 250 MG/1
250 TABLET, FILM COATED ORAL 2 TIMES DAILY
Qty: 10 TABLET | Refills: 0 | Status: SHIPPED | OUTPATIENT
Start: 2020-01-28 | End: 2020-02-02

## 2020-01-28 RX ADMIN — METOPROLOL TARTRATE 12.5 MG: 25 TABLET ORAL at 08:50

## 2020-01-28 RX ADMIN — ASPIRIN 81 MG: 81 TABLET, COATED ORAL at 08:50

## 2020-01-28 RX ADMIN — AMLODIPINE BESYLATE 10 MG: 10 TABLET ORAL at 08:50

## 2020-01-28 RX ADMIN — APIXABAN 2.5 MG: 2.5 TABLET, FILM COATED ORAL at 08:50

## 2020-01-28 RX ADMIN — FAMOTIDINE 20 MG: 20 TABLET ORAL at 08:50

## 2020-01-28 RX ADMIN — Medication 10 ML: at 08:50

## 2020-01-28 RX ADMIN — HYDROCORTISONE: 1 CREAM TOPICAL at 08:50

## 2020-01-28 RX ADMIN — MICONAZOLE NITRATE: 20 POWDER TOPICAL at 08:50

## 2020-01-28 ASSESSMENT — PAIN SCALES - GENERAL
PAINLEVEL_OUTOF10: 0

## 2020-01-28 NOTE — CARE COORDINATION
Case Management            Discharge Note                    Date / Time of Note: 1/28/2020 6:36 PM                  Discharge Note Completed by: Candy Blood Day    Patient Name: Aly Robert   YOB: 1923  Diagnosis: Acute encephalopathy [G93.40]  Acute encephalopathy [G93.40]   Date / Time: 1/26/2020  3:41 PM    Current PCP: Gladys Vernon MD  Clinic patient: No    Hospitalization in the last 30 days: Yes    Advance Directives:  Code Status: Prior  [place] DNR form completed and on chart: Not Indicated    Financial:  Payor: MEDICARE / Plan: MEDICARE PART A AND B / Product Type: *No Product type* /      Pharmacy:    [address] [place] [address] [phone]  [address] [place] [id number]  Phone: [phone] Fax: [phone]      Assistance purchasing medications?:    Assistance provided by Case Management: None at this time    Does patient want to participate in local refill/ meds to beds program?:      Meds To Beds General Rules:  1. Can ONLY be done Monday- Friday between 8:30am-5pm  2. Prescription(s) must be in pharmacy by 3pm to be filled same day  3. Copy of patient's insurance/ prescription drug card and patient face sheet must be sent along with the prescription(s)  4. Cost of Rx cannot be added to hospital bill. If financial assistance is needed, please contact unit  or ;  or  CANNOT provide pharmacy voucher for patients co-pays  5.  Patients can then  the prescription on their way out of the hospital at discharge, or pharmacy can deliver to the bedside if staff is available. (payment due at time of pick-up or delivery - cash, check, or card accepted)     Able to afford home medications/ co-pay costs: Yes    ADLS:  Current PT AM-PAC Score: 17 /24  Current OT AM-PAC Score: 19 /24      Discharge Disposition: Home- No Services Needed    LOC at discharge: Not Applicable  ELISHA Completed: Not Indicated      Notification completed in HENS/PAS?:  Not Applicable    IMM Completed:   Not Indicated    Transportation:  Transportation Plan for discharge: family   Mode of Transport: Private Car    Home Care:  Home Care ordered at discharge: Not Indicated    Durable Medical Equipment:  DME Provider: N/A  Equipment obtained during hospitalization: No New DME needs. Home Oxygen and Respiratory Equipment:  Oxygen needed at discharge?: Not Indicated    Dialysis:  Dialysis patient: No    Dialysis Center:  Not Applicable    Referrals made at [facility] for outpatient continued care:  Not Applicable    Additional CM Notes:   Patient discharge back to [facility]. 24 hour aides at bedside. Aides/family to transport back to the facility. Patient has no other new needs. The Plan for Transition of Care is related to the following treatment goals   Urinary tract infection without hematuria N39.0         The Patient and/or patient representative Patient was provided with a choice of provider and agrees with the discharge plan Yes    Freedom of choice list was provided with basic dialogue that supports the patient's individualized plan of care/goals and shares the quality data associated with the providers.  Yes    Care Transitions patient: Yes    DANDRE Sullivan  [facility] [employer]  Case Management Department  Ph: [phone]  Fax: [phone]

## 2020-01-28 NOTE — PROGRESS NOTES
Speech Language Pathology    Chart reviewed. Spoke with RN this morning who reported pt had no difficulty with lunch or dinner yesterday and lungs remain clear. Will attempt to see pt this date as time permits.     Francisca Coughlin, [address]  Speech-Language Pathologist  Pager [phone]

## 2020-01-28 NOTE — PROGRESS NOTES
Patient discharged home with her caregiver. Discharge instructions reviewed with caregiver and she verbalizes understanding. IV removed, site unremarkable. Script for Cipro called in to [address] per request of patient. Taken by wheelchair to car.

## 2020-01-28 NOTE — PROGRESS NOTES
Physical Therapy  Facility/Department: [address]  Daily Treatment Note  NAME: Herman Oleary  : 1923  MRN: [id number]    Date of Service: 2020    Discharge Recommendations:Lzizie Mccarty scored a 17/ on the AM-PAC short mobility form. Current research shows that an AM-PAC score of 17 or less is typically not associated with a discharge to the patient's home setting. Based on the patients AM-PAC score and their current functional mobility deficits, it is recommended that the patient have 3-5 sessions per week of Physical Therapy at d/c to increase the patients independence. PT Equipment Recommendations  Equipment Needed: No    Assessment   Body structures, Functions, Activity limitations: Decreased functional mobility ; Decreased ADL status; Decreased strength;Decreased ROM; Decreased cognition;Decreased balance;Decreased endurance;Decreased posture  Assessment: CGA for transfers and amb with RW. Pt reporting feeling back to her normal self. 80 y.o. female admit  for dysuria and altered mental status. Pt presents with functional mobility slightly below baseline. Pt reports independence with mobility at home with a walker (isn't sure what kind she has) with 24 hr caregivers who assist as needed and plans to return there upon DC. Reports inc fatigue ambulating in hospital room compared to longer distances at home. Will continue with IP PT to maximize functional mobility. PT Education: PT Role;Plan of Care;General Safety;Gait Training; Adaptive Device Training;Transfer Training;Functional Mobility Training;Equipment  Patient Education: Pt verbalizes understanding but requires frequent reminders throughout session  REQUIRES PT FOLLOW UP: Yes  Activity Tolerance  Activity Tolerance: Patient Tolerated treatment well;Patient limited by fatigue;Patient limited by endurance     Patient Diagnosis(es): The primary encounter diagnosis was Urinary tract infection without hematuria, site unspecified. Diagnoses of Elevated troponin and Confusion were also pertinent to this visit. has a past medical history of CAD (coronary artery disease), Hiatal hernia, Hyperlipidemia, Hypertension, and Macular degeneration of both eyes. has a past surgical history that includes Diagnostic Cardiac Cath Lab Procedure; Cholecystectomy; Appendectomy; Tonsillectomy and adenoidectomy; joint replacement; bladder repair (Bladder tuck); bladder suspension (2008); cardiovascular stress test (8/12); and doppler echocardiography (10/13). Restrictions  Restrictions/Precautions  Restrictions/Precautions: Fall Risk(high fall risk)  Position Activity Restriction  Other position/activity restrictions: Up with Assist  Subjective   General  Chart Reviewed: Yes  Response To Previous Treatment: Patient with no complaints from previous session. Family / Caregiver Present: No  Subjective  Subjective: Pt denies pain. Wanting to use bathroom. General Comment  Comments: Pt supine in bed upon arrival.   Pain Screening  Patient Currently in Pain: Denies  Vital Signs  Patient Currently in Pain: Denies       Orientation     Cognition      Objective   Bed mobility  Supine to Sit: Stand by assistance  Scooting: Stand by assistance  Comment: additional time needed to complete task. Transfers  Sit to Stand: Contact guard assistance(from EOB and toilet)  Stand to sit: Contact guard assistance  Comment: VCs for hand placement  Ambulation  Ambulation?: Yes  Ambulation 1  Surface: level tile  Device: Rolling Walker  Assistance: Contact guard assistance  Quality of Gait: flexed trunk to nearly 70 deg. Gait Deviations: Slow Jena;Decreased step length;Decreased step height  Distance: Pt amb with RW and CGA for 20 ft x 2. Comments: Pt amb to bathroom, urinated and brief changed, pt performing her own hygiene and only needing slight assist for brief up and down. Pt stood at sink for hand washing. Fatigue causing increased trunk flexion.

## 2020-01-28 NOTE — DISCHARGE SUMMARY
INTERNAL MEDICINE    RESIDENT DISCHARGE SUMMARY   Discharge Summaries      Name:Lizzie Mackey       :  1923              MRN:  [id number]  PCP: Sadiq Jordan MD    Admit date:  2020    Discharge date:  2020  Admitting Physician:  Zamzam Servin MD    Admission Condition:  poor  Discharged Condition:  fair    Consults:  none      Discharge Diagnoses:    - Acute metabolic encephalopathy, resolved  - UTI, E coli induced  - Type II (demand) NSTEMI  - Dementia    Hospital Course: This 80 y.o. F was admitted to [facility] for AMS and acute encephalopathy observed at SNF. She was found to have a UTI on presentation and also appeared mildly dehydrated. Her confusion improved to baseline mental status w IVFs and abx, she was given IV cftx as inpatient and discharged on cipro PO as below to complete 1 wk abx course. Trops were mildly elevated to 0.03 x2 before decreasing to <0.01 w IVFs and abx. Patient was discharged in stable condition w close follow up w PCP      Discharge Medications:     Gerson Coronado   Home Medication Instructions LPO:[id number]    Printed on:20 0932   Medication Information                      amLODIPine (NORVASC) 10 MG tablet  1 TABLET BY MOUTH EVERY MORNING             aspirin 81 MG EC tablet  Take 81 mg by mouth daily. ciprofloxacin (CIPRO) 250 MG tablet  Take 1 tablet by mouth 2 times daily for 5 days             donepezil (ARICEPT) 10 MG tablet  2 qd             ELIQUIS 2.5 MG TABS tablet  1 TABLET BY MOUTH TWICE DAILY             famotidine (PEPCID) 20 MG tablet  TAKE 1 TABLET BY MOUTH DAILY             metoprolol tartrate (LOPRESSOR) 25 MG tablet  Take 0.5 tablets by mouth 2 times daily             miconazole (MICOTIN) 2 % powder  Apply topically 2 times daily.              simvastatin (ZOCOR) 20 MG tablet  1 TABLET BY MOUTH AT BEDTIME * NO GRAPEFRUIT *             vitamin B-12 (CYANOCOBALAMIN) 1000 MCG tablet  Take 1 tablet by mouth daily FACP

## 2020-01-28 NOTE — PLAN OF CARE
D: Redness noted under bi breast & bi LE. Washed bi LE & under breast with Chlorhexidine wipes. Denies any dysuria, but does have some incontinence. Gait steady with stand by assist and walker.   A: Cont to monitor during hourly rounds

## 2020-01-29 ENCOUNTER — CARE COORDINATION (OUTPATIENT)
Dept: CASE MANAGEMENT | Age: 85
End: 2020-01-29

## 2020-01-29 ENCOUNTER — TELEPHONE (OUTPATIENT)
Dept: INTERNAL MEDICINE CLINIC | Age: 85
End: 2020-01-29

## 2020-01-29 PROCEDURE — 1111F DSCHRG MED/CURRENT MED MERGE: CPT

## 2020-01-31 NOTE — TELEPHONE ENCOUNTER
Patient's daughter, Marielle Moulton, calling back in regards to mother. Stated that her mother was just recently discharged from the hospital on 01/26/2020. Stated that her mother is currently in a independent living facility. Stating that her mother is delusional and is concerned. Daughter is stating that they are wanting to move her mother down to [place] and I wanting Dr. Vladimir Stone opinion or recommendation on what is best. Patient's daughter will be available until 11:30am or after 4pm today.

## 2020-02-04 ENCOUNTER — OFFICE VISIT (OUTPATIENT)
Dept: INTERNAL MEDICINE CLINIC | Age: 85
End: 2020-02-04
Payer: MEDICARE

## 2020-02-04 VITALS
OXYGEN SATURATION: 95 % | SYSTOLIC BLOOD PRESSURE: 120 MMHG | WEIGHT: 110 LBS | HEIGHT: 61 IN | HEART RATE: 91 BPM | BODY MASS INDEX: 20.77 KG/M2 | DIASTOLIC BLOOD PRESSURE: 60 MMHG

## 2020-02-04 PROBLEM — Z71.89 ENCOUNTER FOR FAMILY CONFERENCE WITH PATIENT PRESENT: Status: ACTIVE | Noted: 2018-03-06

## 2020-02-04 PROBLEM — G93.40 ACUTE ENCEPHALOPATHY: Status: RESOLVED | Noted: 2020-01-26 | Resolved: 2020-02-04

## 2020-02-04 PROCEDURE — 99215 OFFICE O/P EST HI 40 MIN: CPT | Performed by: INTERNAL MEDICINE

## 2020-02-04 PROCEDURE — G8484 FLU IMMUNIZE NO ADMIN: HCPCS | Performed by: INTERNAL MEDICINE

## 2020-02-04 PROCEDURE — 1090F PRES/ABSN URINE INCON ASSESS: CPT | Performed by: INTERNAL MEDICINE

## 2020-02-04 PROCEDURE — 1111F DSCHRG MED/CURRENT MED MERGE: CPT | Performed by: INTERNAL MEDICINE

## 2020-02-04 PROCEDURE — G8420 CALC BMI NORM PARAMETERS: HCPCS | Performed by: INTERNAL MEDICINE

## 2020-02-04 PROCEDURE — 1123F ACP DISCUSS/DSCN MKR DOCD: CPT | Performed by: INTERNAL MEDICINE

## 2020-02-04 PROCEDURE — 1036F TOBACCO NON-USER: CPT | Performed by: INTERNAL MEDICINE

## 2020-02-04 PROCEDURE — G8427 DOCREV CUR MEDS BY ELIG CLIN: HCPCS | Performed by: INTERNAL MEDICINE

## 2020-02-04 PROCEDURE — 4040F PNEUMOC VAC/ADMIN/RCVD: CPT | Performed by: INTERNAL MEDICINE

## 2020-02-04 ASSESSMENT — PATIENT HEALTH QUESTIONNAIRE - PHQ9
1. LITTLE INTEREST OR PLEASURE IN DOING THINGS: 0
2. FEELING DOWN, DEPRESSED OR HOPELESS: 0
SUM OF ALL RESPONSES TO PHQ9 QUESTIONS 1 & 2: 0
SUM OF ALL RESPONSES TO PHQ QUESTIONS 1-9: 0
SUM OF ALL RESPONSES TO PHQ QUESTIONS 1-9: 0

## 2020-02-04 NOTE — PROGRESS NOTES
sounds: Normal breath sounds. Abdominal:      General: Bowel sounds are normal.      Palpations: Abdomen is soft. Musculoskeletal:      Right lower leg: No edema. Left lower leg: No edema. Skin:     Coloration: Skin is not jaundiced. Psychiatric:         Attention and Perception: She does not perceive auditory hallucinations. Mood and Affect: Affect is angry. Speech: Speech normal.         Thought Content: Thought content is not delusional.         Cognition and Memory: She exhibits impaired recent memory. ASSESSMENT:       Encounter Diagnoses   Name Primary?  Encounter for family conference with patient present    [facility] discharge follow-up     Cognitive impairment     Coronary artery disease involving native coronary artery of native heart without angina pectoris     Paroxysmal atrial fibrillation ([facility] [address]        Encounter for family conference with patient present  Patient was seen today with her daughter and son-in-law. Long discussion held regarding the patient's potential move to [place] to be closer to the daughter. The patient made clear in no uncertain terms that she would refuse to leave her current living environment and move anywhere. She does have sitters available 24/7 for assistance. The quality of the sitters cannot be guaranteed. The daughter will look into other options for sitters on a more skilled level. We will see if our care coordinator has any suggestions. Hospital discharge follow-up  Patient recently hospitalized with a urinary tract infection, dehydration and encephalopathy. She does not recall this event. She is now back to her baseline status. Cognitive impairment  Her dementia is highly susceptible to any illness. Greatly exacerbated and worsened during acute illnesses such as UTI. Patient has no recall of these events. Patient needs to live in a environment where she can be protected. She has 24/7 sitters.   She adamantly

## 2020-02-04 NOTE — ASSESSMENT & PLAN NOTE
Patient recently hospitalized with a urinary tract infection, dehydration and encephalopathy. She does not recall this event. She is now back to her baseline status.

## 2020-02-04 NOTE — ASSESSMENT & PLAN NOTE
Patient was seen today with her daughter and son-in-law. Long discussion held regarding the patient's potential move to [place] to be closer to the daughter. The patient made clear in no uncertain terms that she would refuse to leave her current living environment and move anywhere. She does have sitters available 24/7 for assistance. The quality of the sitters cannot be guaranteed. The daughter will look into other options for sitters on a more skilled level. We will see if our care coordinator has any suggestions.

## 2020-02-05 ENCOUNTER — CARE COORDINATION (OUTPATIENT)
Dept: CARE COORDINATION | Age: 85
End: 2020-02-05

## 2020-02-05 ENCOUNTER — CARE COORDINATION (OUTPATIENT)
Dept: CASE MANAGEMENT | Age: 85
End: 2020-02-05

## 2020-02-05 ENCOUNTER — TELEPHONE (OUTPATIENT)
Dept: INTERNAL MEDICINE CLINIC | Age: 85
End: 2020-02-05

## 2020-02-05 NOTE — TELEPHONE ENCOUNTER
Lucia with [facility] called stating they need an order for skilled nursing, last office notes, insurance card and demographic sheet. Please fax to  [phone]. Please call to advise.

## 2020-02-05 NOTE — CARE COORDINATION
[facility] Transitions Follow Up Call    2020    Patient: Fran Main  Patient : 1923   MRN: [id number]   Reason for Admission: Acute Encephalopathy  Discharge Date: 20 RARS: Readmission Risk Score: 13         Spoke with: Fran Main and daughter [facility] [place] [facility]. Transitions Subsequent and Final Call    Schedule Follow Up Appointment with PCP:  Declined  Subsequent and Final Calls  Do you have any ongoing symptoms?:  No  Have your medications changed?:  No  Do you have any questions related to your medications?:  No  Do you currently have any active services?:  No  Are you currently active with any services?:  Home Health  Do you have any needs or concerns that I can assist you with?:  No  Identified Barriers:  None  Care Transitions Interventions  No Identified Needs                          Other Interventions:          Summary  CTN spoke with patient and daughter this afternoon for follow up CTN call. Patient states she is doing well, reports no falls, no difficulty with urination, BM's or Appetite. No nausea, vomiting, fevers, chills, SOB or Cough. Patient had follow up with MD on yesterday, per daughter states patient has no new or changed medications, does feel patient is getting along pretty well, but feels her mentation could be better, knows that some of this is due to worsening Dementia. CTN encouraged daughter to continue to monitor patient for any of the above s/s, reporting any that may present immediately, also instructed to make sure patient is taking all medications as prescribed. HHA in home as well. CTN provided education on s/s that require medical attention and when to seek medical attention. CTN advised Pt of use of urgent care or physicians 24 hr access line if assistance is needed after hours or weekend. Pt denies any needs or concerns and is agreeable with additional calls.     Follow Up  Future Appointments   Date Time Provider Lucille Herrera

## 2020-02-05 NOTE — CARE COORDINATION
IFRAH Note:  The PCP requested the RN, ACM called the pt's daughter to discuss more nursing care for the pt d/t increased memory loss and recent admission d/t AMS and acute encephalopathy. She was found to have a UTI on presentation and also appeared mildly dehydrated. The RN, ACM called and left a voice message for the daughter asking her to call the nurse at the daughter's convenience. The [place] currently in [place] at [address] [place] [address] with 24 hour sitters.

## 2020-02-07 ENCOUNTER — CARE COORDINATION (OUTPATIENT)
Dept: CASE MANAGEMENT | Age: 85
End: 2020-02-07

## 2020-02-07 NOTE — CARE COORDINATION
IFRAH Note:  The RNIFRAH spoke with the pt's daughter, Camila Salter. The daughter stated [facility] was able to arrange for [facility] to have SN start next week. The daughter stated the [facility] will be seeing the pt twice a week. The daughter stated the pt does have two sitters that cover the pt 24/7. The daughter stated the pt is actually in [place] [id number] and does not want to currently move. The daughter stated she has discussed [place] with the pt and she is not sure if [facility] [place] will allow the [place] to stay in [place] when her lease is up in a few months. The daughter stated she did tour the Memory Unit at [facility] and was pleased with it if the time comes that her mother needs that type of care. The RNIFRAH explained that PC or Hospice Care may also be an option at some point. The RNIFRAH made sure the daughter had the nurse's contact information and instructed the daughter to call with any questions or concerns.

## 2020-02-07 NOTE — CARE COORDINATION
[facility] Transitions Follow Up Call    2020    Patient: Charles Ayala  Patient : 1923   MRN: [id number]   Reason for Admission: Acute Encephalopathy  Discharge Date: 20 RARS: Readmission Risk Score: 15         Spoke with: 115Mitra Lin Transitions Subsequent and Final Call    Schedule Follow Up Appointment with PCP:  Declined  Subsequent and Final Calls  Do you have any ongoing symptoms?:  No  Have your medications changed?:  No  Do you have any questions related to your medications?:  No  Do you currently have any active services?:  Yes  Are you currently active with any services?:  Home Health  Do you have any needs or concerns that I can assist you with?:  No  Identified Barriers:  None  Care Transitions Interventions  No Identified Needs                          Other Interventions:          Summary  CTN spoke with patient this am for follow up CTN call. Patient states she is doing well, reports no complaints of any nausea, vomiting, fevers, chills, SOB or Cough. Appetite is good, no difficulty with urination, BM's or LE Edema. Patient's mentation seems to be appropriate, discussed weather and all the snow and cold, today. No new or changed medications, HHA in home at time of CTN call. No other issues or concerns at this time. CTN encouraged patient to rest as needed and try and stay warm. CTN provided education on s/s that require medical attention and when to seek medical attention. CTN advised Pt of use of urgent care or physicians 24 hr access line if assistance is needed after hours or weekend. Pt denies any needs or concerns and is agreeable with additional calls.     Follow Up  Future Appointments   Date Time Provider Lucille Herrera   3/4/2020  2:00 PM MD ELSA Sandra [address] IM [facility]       Luzma Armijo RN

## 2020-02-11 ENCOUNTER — CARE COORDINATION (OUTPATIENT)
Dept: CASE MANAGEMENT | Age: 85
End: 2020-02-11

## 2020-02-11 NOTE — CARE COORDINATION
[facility] Transitions Follow Up Call    2020    Patient: Yvette Rangel  Patient : 1923   MRN: [id number]   Reason for Admission: Acute Encephalopathy   Discharge Date: 20 RARS: Readmission Risk Score: 13         Spoke with: Telma Lin Transitions Subsequent and Final Call    Schedule Follow Up Appointment with PCP:  Declined  Subsequent and Final Calls  Do you have any ongoing symptoms?:  No  Have your medications changed?:  No  Do you have any questions related to your medications?:  No  Do you currently have any active services?:  No  Are you currently active with any services?:  Home Health  Do you have any needs or concerns that I can assist you with?:  No  Identified Barriers:  None  Care Transitions Interventions  No Identified Needs                          Other Interventions:          Summary  CTN spoke with patient this afternoon for final follow up CTN call. Patient states she is doing well, reports no complaints of any nausea, vomiting, fevers, chills, SOB or Cough. No difficulty with urination, BM's or appetite, states she is using her walker at all times, has not had any falls, HHA in home daily, and at in home at time of CTN call today. No new or changed medications, no other issues or concerns at this time, has had follow up with PCP already. CTN provided education on s/s that require medical attention and when to seek medical attention. CTN advised Pt of use of urgent care or physicians 24 hr access line if assistance is needed after hours or weekend.       Follow Up  Future Appointments   Date Time Provider Lucille Herrera   3/4/2020  2:00 PM Evan Bueno MD [facility] [address] IM [facility]       Mera Espinoza RN

## 2020-03-04 ENCOUNTER — OFFICE VISIT (OUTPATIENT)
Dept: INTERNAL MEDICINE CLINIC | Age: 85
End: 2020-03-04
Payer: MEDICARE

## 2020-03-04 VITALS
SYSTOLIC BLOOD PRESSURE: 130 MMHG | OXYGEN SATURATION: 95 % | BODY MASS INDEX: 20.77 KG/M2 | HEART RATE: 103 BPM | WEIGHT: 110 LBS | HEIGHT: 61 IN | DIASTOLIC BLOOD PRESSURE: 62 MMHG

## 2020-03-04 PROBLEM — Z71.89 ENCOUNTER FOR FAMILY CONFERENCE WITH PATIENT PRESENT: Status: RESOLVED | Noted: 2018-03-06 | Resolved: 2020-03-04

## 2020-03-04 PROBLEM — Z09 HOSPITAL DISCHARGE FOLLOW-UP: Status: RESOLVED | Noted: 2018-07-27 | Resolved: 2020-03-04

## 2020-03-04 PROCEDURE — 1123F ACP DISCUSS/DSCN MKR DOCD: CPT | Performed by: INTERNAL MEDICINE

## 2020-03-04 PROCEDURE — G8427 DOCREV CUR MEDS BY ELIG CLIN: HCPCS | Performed by: INTERNAL MEDICINE

## 2020-03-04 PROCEDURE — 1036F TOBACCO NON-USER: CPT | Performed by: INTERNAL MEDICINE

## 2020-03-04 PROCEDURE — 99214 OFFICE O/P EST MOD 30 MIN: CPT | Performed by: INTERNAL MEDICINE

## 2020-03-04 PROCEDURE — 1090F PRES/ABSN URINE INCON ASSESS: CPT | Performed by: INTERNAL MEDICINE

## 2020-03-04 PROCEDURE — G8484 FLU IMMUNIZE NO ADMIN: HCPCS | Performed by: INTERNAL MEDICINE

## 2020-03-04 PROCEDURE — 4040F PNEUMOC VAC/ADMIN/RCVD: CPT | Performed by: INTERNAL MEDICINE

## 2020-03-04 PROCEDURE — G8420 CALC BMI NORM PARAMETERS: HCPCS | Performed by: INTERNAL MEDICINE

## 2020-03-04 NOTE — PROGRESS NOTES
SUBJECTIVE:  Patient ID: Eyal Wong is an 80 y.o. female. HPI: Patient here today for the f/u of chronic problems-- see Problem List and associated comments. New issues or complaints include (alsosee Assessment for more details): Patient brought in with her aide today. Over the last month she has been getting constant care and vigilance. Her food intake has been good but she is only doing slightly better with fluids. There have been no recurrent episodes of encephalopathy. Review of Systems   Unable to perform ROS: Dementia       OBJECTIVE:    /62 (Site: Left Upper Arm, Position: Sitting, Cuff Size: Medium Adult)   Pulse 103   Ht 5' 1\" (1.549 m)   Wt 110 lb (49.9 kg)   SpO2 95%   BMI 20.78 kg/m²      Physical Exam  Constitutional:       Comments: Using a walker   Eyes:      General: No scleral icterus. Cardiovascular:      Rate and Rhythm: Normal rate. Heart sounds: Murmur present. Pulmonary:      Effort: Pulmonary effort is normal. No respiratory distress. Abdominal:      General: Bowel sounds are normal.      Palpations: Abdomen is soft. Musculoskeletal:      Right lower leg: Edema (Ankle edema) present. Left lower leg: Edema (Ankle edema) present. Skin:     Coloration: Skin is not jaundiced or pale. Neurological:      Mental Status: She is alert. Psychiatric:         Attention and Perception: She does not perceive auditory hallucinations. Mood and Affect: Mood is not anxious or depressed. Thought Content: Thought content is not delusional.         Cognition and Memory: She exhibits impaired recent memory. ASSESSMENT:       Encounter Diagnoses   Name Primary?  Bacteriuria Yes    Cognitive impairment     Chronic diastolic heart failure (HCC)     Essential hypertension        Cognitive impairment  Memory is still an issue. But she is not having any encephalopathic episodes.   Check a UA today if patient can give specimen to look for possible impending UTI. Chronic diastolic heart failure (HCC)  No overt signs of failure at this time. Essential hypertension  BP okay        PLAN:See ASSESSMENT for evaluation & PLAN     Orders Placed This Encounter   Procedures    Urinalysis Reflex to Culture     Standing Status:   Future     Standing Expiration Date:   3/4/2021     Order Specific Question:   SPECIFY(EX-CATH,MIDSTREAM,CYSTO,ETC)? Answer:   NA       PSH, PMH, SH and FH reviewed and noted. Recent and past labs, tests and consultsalso reviewed. Recent or new meds also reviewed.

## 2020-03-06 DIAGNOSIS — R82.71 BACTERIURIA: ICD-10-CM

## 2020-03-06 LAB
BACTERIA: ABNORMAL /HPF
BILIRUBIN URINE: NEGATIVE
BLOOD, URINE: ABNORMAL
CLARITY: ABNORMAL
COLOR: YELLOW
EPITHELIAL CELLS, UA: 4 /HPF (ref 0–5)
GLUCOSE URINE: NEGATIVE MG/DL
KETONES, URINE: NEGATIVE MG/DL
LEUKOCYTE ESTERASE, URINE: ABNORMAL
MICROSCOPIC EXAMINATION: YES
NITRITE, URINE: POSITIVE
PH UA: 6 (ref 5–8)
PROTEIN UA: 100 MG/DL
RBC UA: ABNORMAL /HPF (ref 0–4)
SPECIFIC GRAVITY UA: 1.02 (ref 1–1.03)
URINE REFLEX TO CULTURE: YES
URINE TYPE: ABNORMAL
UROBILINOGEN, URINE: 0.2 E.U./DL
WBC UA: 445 /HPF (ref 0–5)

## 2020-03-09 LAB
ORGANISM: ABNORMAL
URINE CULTURE, ROUTINE: ABNORMAL

## 2020-03-09 RX ORDER — CIPROFLOXACIN 250 MG/1
TABLET, FILM COATED ORAL
Qty: 10 TABLET | Refills: 0 | Status: ON HOLD
Start: 2020-03-09 | End: 2020-04-01 | Stop reason: CLARIF

## 2020-03-13 ENCOUNTER — CARE COORDINATION (OUTPATIENT)
Dept: CARE COORDINATION | Age: 85
End: 2020-03-13

## 2020-03-18 ENCOUNTER — TELEPHONE (OUTPATIENT)
Dept: INTERNAL MEDICINE CLINIC | Age: 85
End: 2020-03-18

## 2020-03-18 LAB
BACTERIA: ABNORMAL /HPF
BILIRUBIN URINE: NEGATIVE
BLOOD, URINE: ABNORMAL
CLARITY: ABNORMAL
COLOR: YELLOW
EPITHELIAL CELLS, UA: 1 /HPF (ref 0–5)
GLUCOSE URINE: NEGATIVE MG/DL
HYALINE CASTS: 11 /LPF (ref 0–8)
KETONES, URINE: NEGATIVE MG/DL
LEUKOCYTE ESTERASE, URINE: ABNORMAL
MICROSCOPIC EXAMINATION: YES
NITRITE, URINE: POSITIVE
PH UA: 7.5 (ref 5–8)
PROTEIN UA: NEGATIVE MG/DL
RBC UA: ABNORMAL /HPF (ref 0–4)
SPECIFIC GRAVITY UA: 1.01 (ref 1–1.03)
URINE REFLEX TO CULTURE: YES
URINE TYPE: ABNORMAL
UROBILINOGEN, URINE: 0.2 E.U./DL
WBC UA: 77 /HPF (ref 0–5)

## 2020-03-18 RX ORDER — NITROFURANTOIN MACROCRYSTALS 100 MG/1
CAPSULE ORAL
Qty: 15 CAPSULE | Refills: 0 | Status: ON HOLD
Start: 2020-03-18 | End: 2020-04-01 | Stop reason: CLARIF

## 2020-03-19 LAB — URINE CULTURE, ROUTINE: NORMAL

## 2020-03-24 ENCOUNTER — TELEPHONE (OUTPATIENT)
Dept: INTERNAL MEDICINE CLINIC | Age: 85
End: 2020-03-24

## 2020-03-25 NOTE — TELEPHONE ENCOUNTER
The urinalysis showed mixed bacteria. The culture just grew skin damaris and contaminants. This is not usually treated with antibiotics-it is usually chronic. The only time this is treated is if the patient is symptomatic or has other symptoms that could be attributable to a urinary tract infection such as change in mental status.

## 2020-03-27 ENCOUNTER — TELEPHONE (OUTPATIENT)
Dept: INTERNAL MEDICINE CLINIC | Age: 85
End: 2020-03-27

## 2020-03-27 NOTE — TELEPHONE ENCOUNTER
I did addendum to the February 4 note- hopefully this will allow coverage. Can fax the visit note to whoever wants it.

## 2020-03-27 NOTE — TELEPHONE ENCOUNTER
Sol Chinchilla from [address] states Medicare denied claim for skilled nursing due to the office visit note from 2/4 and the order (script) DX does not coincide with the office visit note on 2/4.  Sol Chinchilla would like to know if  could update the office visit note from 2/4 to list pt has Colorany artery disease and Afib

## 2020-04-01 ENCOUNTER — TELEPHONE (OUTPATIENT)
Dept: INTERNAL MEDICINE CLINIC | Age: 85
End: 2020-04-01

## 2020-04-01 ENCOUNTER — APPOINTMENT (OUTPATIENT)
Dept: GENERAL RADIOLOGY | Age: 85
DRG: 291 | End: 2020-04-01
Payer: MEDICARE

## 2020-04-01 ENCOUNTER — APPOINTMENT (OUTPATIENT)
Dept: CT IMAGING | Age: 85
DRG: 291 | End: 2020-04-01
Payer: MEDICARE

## 2020-04-01 ENCOUNTER — HOSPITAL ENCOUNTER (INPATIENT)
Age: 85
LOS: 3 days | Discharge: NURSING, REHAB OR LONG-TERM CARE | DRG: 291 | End: 2020-04-04
Attending: EMERGENCY MEDICINE | Admitting: HOSPITALIST
Payer: MEDICARE

## 2020-04-01 LAB
ALBUMIN SERPL-MCNC: 3.5 G/DL (ref 3.4–5)
ALP BLD-CCNC: 59 U/L (ref 40–129)
ALT SERPL-CCNC: 16 U/L (ref 10–40)
ANION GAP SERPL CALCULATED.3IONS-SCNC: 12 MMOL/L (ref 3–16)
AST SERPL-CCNC: 16 U/L (ref 15–37)
BASE EXCESS VENOUS: 4.6 MMOL/L (ref -2–3)
BASOPHILS ABSOLUTE: 0 K/UL (ref 0–0.2)
BASOPHILS RELATIVE PERCENT: 0.2 %
BILIRUB SERPL-MCNC: <0.2 MG/DL (ref 0–1)
BILIRUBIN DIRECT: <0.2 MG/DL (ref 0–0.3)
BILIRUBIN, INDIRECT: ABNORMAL MG/DL (ref 0–1)
BUN BLDV-MCNC: 16 MG/DL (ref 7–20)
C-REACTIVE PROTEIN: 9 MG/L (ref 0–5.1)
CALCIUM SERPL-MCNC: 9.5 MG/DL (ref 8.3–10.6)
CARBOXYHEMOGLOBIN: 2.4 % (ref 0–1.5)
CHLORIDE BLD-SCNC: 99 MMOL/L (ref 99–110)
CO2: 30 MMOL/L (ref 21–32)
CREAT SERPL-MCNC: 0.7 MG/DL (ref 0.6–1.2)
EKG ATRIAL RATE: 82 BPM
EKG DIAGNOSIS: NORMAL
EKG P AXIS: 78 DEGREES
EKG P-R INTERVAL: 152 MS
EKG Q-T INTERVAL: 390 MS
EKG QRS DURATION: 72 MS
EKG QTC CALCULATION (BAZETT): 455 MS
EKG R AXIS: 43 DEGREES
EKG T AXIS: 54 DEGREES
EKG VENTRICULAR RATE: 82 BPM
EOSINOPHILS ABSOLUTE: 0 K/UL (ref 0–0.6)
EOSINOPHILS RELATIVE PERCENT: 0.1 %
FERRITIN: 37.3 NG/ML (ref 15–150)
GFR AFRICAN AMERICAN: >60
GFR NON-AFRICAN AMERICAN: >60
GLUCOSE BLD-MCNC: 108 MG/DL (ref 70–99)
HCO3 VENOUS: 31.2 MMOL/L (ref 24–28)
HCT VFR BLD CALC: 29.7 % (ref 36–48)
HEMOGLOBIN, VEN, REDUCED: 46.2 %
HEMOGLOBIN: 9.4 G/DL (ref 12–16)
LACTATE DEHYDROGENASE: 205 U/L (ref 100–190)
LACTIC ACID, SEPSIS: 0.7 MMOL/L (ref 0.4–1.9)
LYMPHOCYTES ABSOLUTE: 0.3 K/UL (ref 1–5.1)
LYMPHOCYTES RELATIVE PERCENT: 4.1 %
MCH RBC QN AUTO: 27.5 PG (ref 26–34)
MCHC RBC AUTO-ENTMCNC: 31.6 G/DL (ref 31–36)
MCV RBC AUTO: 87 FL (ref 80–100)
METHEMOGLOBIN VENOUS: 0.5 % (ref 0–1.5)
MONOCYTES ABSOLUTE: 0.4 K/UL (ref 0–1.3)
MONOCYTES RELATIVE PERCENT: 5.5 %
NEUTROPHILS ABSOLUTE: 6.5 K/UL (ref 1.7–7.7)
NEUTROPHILS RELATIVE PERCENT: 90.1 %
O2 SAT, VEN: 52 %
PCO2, VEN: 59.1 MMHG (ref 41–51)
PDW BLD-RTO: 15.5 % (ref 12.4–15.4)
PH VENOUS: 7.34 (ref 7.35–7.45)
PLATELET # BLD: 417 K/UL (ref 135–450)
PMV BLD AUTO: 7.2 FL (ref 5–10.5)
PO2, VEN: 33.6 MMHG (ref 25–40)
POTASSIUM REFLEX MAGNESIUM: 3.7 MMOL/L (ref 3.5–5.1)
PRO-BNP: 2343 PG/ML (ref 0–449)
PROCALCITONIN: 0.3 NG/ML (ref 0–0.15)
RBC # BLD: 3.41 M/UL (ref 4–5.2)
SODIUM BLD-SCNC: 141 MMOL/L (ref 136–145)
TCO2 CALC VENOUS: 33 MMOL/L
TOTAL PROTEIN: 5.9 G/DL (ref 6.4–8.2)
TROPONIN: <0.01 NG/ML
WBC # BLD: 7.2 K/UL (ref 4–11)

## 2020-04-01 PROCEDURE — 80076 HEPATIC FUNCTION PANEL: CPT

## 2020-04-01 PROCEDURE — 2580000003 HC RX 258: Performed by: EMERGENCY MEDICINE

## 2020-04-01 PROCEDURE — 93005 ELECTROCARDIOGRAM TRACING: CPT | Performed by: EMERGENCY MEDICINE

## 2020-04-01 PROCEDURE — 83615 LACTATE (LD) (LDH) ENZYME: CPT

## 2020-04-01 PROCEDURE — 6360000002 HC RX W HCPCS: Performed by: EMERGENCY MEDICINE

## 2020-04-01 PROCEDURE — 99285 EMERGENCY DEPT VISIT HI MDM: CPT

## 2020-04-01 PROCEDURE — 83880 ASSAY OF NATRIURETIC PEPTIDE: CPT

## 2020-04-01 PROCEDURE — 82803 BLOOD GASES ANY COMBINATION: CPT

## 2020-04-01 PROCEDURE — 86140 C-REACTIVE PROTEIN: CPT

## 2020-04-01 PROCEDURE — 87040 BLOOD CULTURE FOR BACTERIA: CPT

## 2020-04-01 PROCEDURE — 6360000002 HC RX W HCPCS: Performed by: STUDENT IN AN ORGANIZED HEALTH CARE EDUCATION/TRAINING PROGRAM

## 2020-04-01 PROCEDURE — 83605 ASSAY OF LACTIC ACID: CPT

## 2020-04-01 PROCEDURE — 84145 PROCALCITONIN (PCT): CPT

## 2020-04-01 PROCEDURE — 80048 BASIC METABOLIC PNL TOTAL CA: CPT

## 2020-04-01 PROCEDURE — 80053 COMPREHEN METABOLIC PANEL: CPT

## 2020-04-01 PROCEDURE — 85025 COMPLETE CBC W/AUTO DIFF WBC: CPT

## 2020-04-01 PROCEDURE — 99222 1ST HOSP IP/OBS MODERATE 55: CPT | Performed by: HOSPITALIST

## 2020-04-01 PROCEDURE — 84484 ASSAY OF TROPONIN QUANT: CPT

## 2020-04-01 PROCEDURE — 82728 ASSAY OF FERRITIN: CPT

## 2020-04-01 PROCEDURE — 71250 CT THORAX DX C-: CPT

## 2020-04-01 PROCEDURE — 2060000000 HC ICU INTERMEDIATE R&B

## 2020-04-01 PROCEDURE — 36415 COLL VENOUS BLD VENIPUNCTURE: CPT

## 2020-04-01 PROCEDURE — 71046 X-RAY EXAM CHEST 2 VIEWS: CPT

## 2020-04-01 PROCEDURE — 2580000003 HC RX 258: Performed by: STUDENT IN AN ORGANIZED HEALTH CARE EDUCATION/TRAINING PROGRAM

## 2020-04-01 RX ORDER — BENAZEPRIL HYDROCHLORIDE 5 MG/1
5 TABLET, FILM COATED ORAL DAILY
Status: ON HOLD | COMMUNITY
End: 2020-04-01 | Stop reason: CLARIF

## 2020-04-01 RX ORDER — SODIUM CHLORIDE 0.9 % (FLUSH) 0.9 %
10 SYRINGE (ML) INJECTION PRN
Status: DISCONTINUED | OUTPATIENT
Start: 2020-04-01 | End: 2020-04-04 | Stop reason: HOSPADM

## 2020-04-01 RX ORDER — FUROSEMIDE 10 MG/ML
20 INJECTION INTRAMUSCULAR; INTRAVENOUS ONCE
Status: COMPLETED | OUTPATIENT
Start: 2020-04-01 | End: 2020-04-01

## 2020-04-01 RX ORDER — SODIUM CHLORIDE 0.9 % (FLUSH) 0.9 %
10 SYRINGE (ML) INJECTION EVERY 12 HOURS SCHEDULED
Status: DISCONTINUED | OUTPATIENT
Start: 2020-04-01 | End: 2020-04-04 | Stop reason: HOSPADM

## 2020-04-01 RX ADMIN — FUROSEMIDE 20 MG: 10 INJECTION, SOLUTION INTRAMUSCULAR; INTRAVENOUS at 19:53

## 2020-04-01 RX ADMIN — CEFTRIAXONE 1 G: 1 INJECTION, POWDER, FOR SOLUTION INTRAMUSCULAR; INTRAVENOUS at 16:06

## 2020-04-01 RX ADMIN — AZITHROMYCIN MONOHYDRATE 500 MG: 500 INJECTION, POWDER, LYOPHILIZED, FOR SOLUTION INTRAVENOUS at 22:20

## 2020-04-01 RX ADMIN — Medication 10 ML: at 22:19

## 2020-04-01 ASSESSMENT — ENCOUNTER SYMPTOMS
SHORTNESS OF BREATH: 0
VOMITING: 0
DIARRHEA: 0
COUGH: 0
NAUSEA: 0
ABDOMINAL PAIN: 0

## 2020-04-01 NOTE — PROGRESS NOTES
4 Eyes Admission Assessment     I agree as the admission nurse that 2 RN's have performed a thorough Head to Toe Skin Assessment on the patient. ALL assessment sites listed below have been assessed on admission. Areas assessed by both nurses: Mercedes Reveal  [x]   Head, Face, and Ears   [x]   Shoulders, Back, and Chest  [x]   Arms, Elbows, and Hands   [x]   Coccyx, Sacrum, and Ischum - Stage 1 blanchable redness on bottom. Bruise on Left hip from fall. Scattered bruising. [x]   Legs, Feet, and Heels        Does the Patient have Skin Breakdown?   Yes a wound was noted on the Admission Assessment and an LDA was Initiated documentation include the Bronwyn-wound, Wound Assessment, Measurements, Dressing Treatment, Drainage, and Color\",         Herman Prevention initiated:  Yes   Wound Care Orders initiated:  No      [facility] nurse consulted for Pressure Injury (Stage 3,4, Unstageable, DTI, NWPT, and Complex wounds):  No      Nurse 1 eSignature: Electronically signed by Michel Rubio RN on 4/1/20 at 5:41 PM EDT    **SHARE this note so that the co-signing nurse is able to place an eSignature**    Nurse 2 eSignature Electronically signed by Ramona Jordan RN on 4/1/2020 at 7:20 PM

## 2020-04-01 NOTE — ED PROVIDER NOTES
[address] ENCOUNTER          ATTENDING PHYSICIAN NOTE       Date of evaluation: 4/1/2020    Chief Complaint     Cough and Shortness of Breath      History of Present Illness     Fabien Lyn is a 80 y.o. female with h/o CAD and HTN who presents because of hypoxia at home when her home health care worker went in to check on her today. Apparently she looked like she was having trouble breathing and was hypoxic into the 80s. EMS was called to bring her to the hospital.  Here the patient has no complaints. She states that she does not feel short of breath and does not have a cough and does not have a fever. She denies any chest pain or abdominal pain or nausea or vomiting. He does not wear oxygen at home for any reason. MS allegedly had to put her on a nonrebreather. Review of Systems     Review of Systems   Constitutional: Negative for chills and fever. Respiratory: Negative for cough and shortness of breath. Cardiovascular: Negative for chest pain. Gastrointestinal: Negative for abdominal pain, diarrhea, nausea and vomiting. All other systems reviewed and are negative. Past Medical, Surgical, Family, and Social History     She has a past medical history of CAD (coronary artery disease), Hiatal hernia, Hyperlipidemia, Hypertension, and Macular degeneration of both eyes. She has a past surgical history that includes Diagnostic Cardiac Cath Lab Procedure; Cholecystectomy; Appendectomy; Tonsillectomy and adenoidectomy; joint replacement; bladder repair (Bladder tuck); bladder suspension (2008); cardiovascular stress test (8/12); and doppler echocardiography (10/13). Her family history includes Heart Attack in her father and mother; Heart Disease in her father and mother; Other in her daughter. She reports that she has quit smoking. She has never used smokeless tobacco. She reports that she does not drink alcohol or use drugs.     Medications     Previous Medications    AMLODIPINE (NORVASC) 10 MG TABLET    1 TABLET BY MOUTH EVERY MORNING    ASPIRIN 81 MG EC TABLET    Take 81 mg by mouth daily. CIPROFLOXACIN (CIPRO) 250 MG TABLET    Take 1 bid    DONEPEZIL (ARICEPT) 10 MG TABLET    2 qd    ELIQUIS 2.5 MG TABS TABLET    1 TABLET BY MOUTH TWICE DAILY    FAMOTIDINE (PEPCID) 20 MG TABLET    TAKE 1 TABLET BY MOUTH DAILY    METOPROLOL TARTRATE (LOPRESSOR) 25 MG TABLET    Take 0.5 tablets by mouth 2 times daily    MICONAZOLE (MICOTIN) 2 % POWDER    Apply topically 2 times daily. NITROFURANTOIN (MACRODANTIN) 100 MG CAPSULE    Take 1 tid    SIMVASTATIN (ZOCOR) 20 MG TABLET    1 TABLET BY MOUTH AT BEDTIME * NO GRAPEFRUIT *    VITAMIN B-12 (CYANOCOBALAMIN) 1000 MCG TABLET    Take 1 tablet by mouth daily       Allergies     She is allergic to pcn [penicillins] and sulfa antibiotics. Physical Exam     INITIAL VITALS: BP: (!) 122/57, Temp: 97.7 °F (36.5 °C), Pulse: 76, Resp: 18, SpO2: (!) 82 %   Physical Exam  Vitals signs and nursing note reviewed. Constitutional:       General: She is not in acute distress. Appearance: She is well-developed. She is not diaphoretic. Cardiovascular:      Rate and Rhythm: Normal rate and regular rhythm. Heart sounds: Murmur present. Pulmonary:      Effort: Pulmonary effort is normal.      Breath sounds: Normal breath sounds. Abdominal:      General: Bowel sounds are normal. There is no distension. Palpations: Abdomen is soft. Tenderness: There is no abdominal tenderness. Musculoskeletal: Normal range of motion. Skin:     General: Skin is warm and dry. Neurological:      Mental Status: She is alert and oriented to person, place, and time.    Psychiatric:         Behavior: Behavior normal.         Diagnostic Results     EKG   Interpreted by Rosendo Elliott MD     Rhythm: normal sinus   Rate: normal  Axis: normal  Ectopy: PAC  Conduction: normal  ST Segments: no acute change  T Waves: no acute change  Q Waves: 33.6 25.0 - 40.0 mmHg    HCO3, Venous 31.2 (H) 24.0 - 28.0 mmol/L    Base Excess, Emmanuel 4.6 (H) -2.0 - 3.0 mmol/L    O2 Sat, Emmanuel 52 Not established %    Carboxyhemoglobin 2.4 (H) 0.0 - 1.5 %    MetHgb, Emmanuel 0.5 0.0 - 1.5 %    TC02 (Calc), Emmanuel 33 mmol/L    Hemoglobin, Emmanuel, Reduced 46.20 %   EKG 12 Lead   Result Value Ref Range    Ventricular Rate 82 BPM    Atrial Rate 82 BPM    P-R Interval 152 ms    QRS Duration 72 ms    Q-T Interval 390 ms    QTc Calculation (Bazett) 455 ms    P Axis 78 degrees    R Axis 43 degrees    T Axis 54 degrees    Diagnosis       EKG performed in ER and to be interpreted by ER physician. Confirmed by MD, ER ([id number]),  Colin Ferris ([phone]) on 4/1/2020 3:24:47 PM     RECENT VITALS:  BP: (!) 119/54,Temp: 97.7 °F (36.5 °C), Pulse: 76, Resp: 18, SpO2: 100 %       ED Course     Nursing Notes, Past Medical Hx, Past Surgical Hx, Social Hx,Allergies, and Family Hx were reviewed. patient was given the following medications:  Orders Placed This Encounter   Medications    AND Linked Order Group     cefTRIAXone (ROCEPHIN) 1 g IVPB in 50 mL D5W minibag     azithromycin (ZITHROMAX) 500 mg in dextrose 5 % 250 mL IVPB       CONSULTS:  Blade De La Garza DECISIONMAKING / Jen  / Maynor Brayfrancisco is a 80 y.o. female presenting with cough and shortness of breath. Initial oxygen saturation on room air was 82%. She does not wear oxygen at home. Apparently there was also some slight change in mental status at home according to the patient's caregiver but she appears to be reasonably well-appearing now. Findings today indicate bibasilar effusions with concomitant pneumonia versus atelectasis. White count and lactic acid normal.  Will start antibiotics for pneumonia given findings. She will need admission given new oxygen requirement. PCP and residents contacted. Clinical Impression     1. Pneumonia due to organism    2.  Bilateral pleural effusion Disposition     DISPOSITION Admitted 04/01/2020 03:45:43 PM       Blayne Valente MD  04/01/20 [id number]

## 2020-04-01 NOTE — H&P
BL 1+ LE edema. Patient has been staying home alone and mostly just has contact with her home health nurse, who is reportedly healthy at this time. -- Droplet & contact precautions  -- Supplemental O2, prn  -- CT Chest WO:  pending  -- Blood Cx:   pending  -- Lactate, Pro-lorrie:   pending  -- LDH, Ferritin, CRP, Liver panel:  pending  -- Ceftriaxone + Azithromycin    ## Chronic Diastolic Heart Failure, HTN - Echo (7/16/18):  EF 55-60% w/o RWMA, G2DD, mod MR, mod TR.  -- as above  -- home Amlodipine  -- home Metoprolol    Chronic Problems:  ## Chronic A-fib - home Eliquis  ## CAD, HLD - home ASA, statin  ## Dementia - home Donepezil  ## GERD - home Famotidine    Code Status:  DNR-CCA  FEN:  General  PPX:  Eliquis   DISPO:  IP    ------------------------------------  Maggi Garay MD, PGY-1  04/01/20  5:13 PM     Addendum to Resident H& P/Progress note:  I have personally seen,examined and evaluated the patient. I have reviewed the current history, physical findings, labs and assessment and plan and agree with note as documented by resident MD ( Dr.Dressler)    CT chest:   1.  Moderate bilateral pleural effusions and associated atelectasis, new compared to 3/20/2019. 2.  Persistent left hemidiaphragmatic elevation. 3.  Bilateral pulmonary nodules measuring up to 8 mm in size. Recommend follow-up CT chest in 6-12 months as recommended on CT chest dated 3/20/2019.     + acute on chronic diastolic heart failure  I don't think the patient has pneumonia. Will proceed with gentle diuresis with IV Lasix.     Radha Camacho MD, FACP

## 2020-04-02 LAB
A/G RATIO: 1.3 (ref 1.1–2.2)
A/G RATIO: 1.3 (ref 1.1–2.2)
ALBUMIN SERPL-MCNC: 3.4 G/DL (ref 3.4–5)
ALBUMIN SERPL-MCNC: 3.5 G/DL (ref 3.4–5)
ALP BLD-CCNC: 57 U/L (ref 40–129)
ALP BLD-CCNC: 58 U/L (ref 40–129)
ALT SERPL-CCNC: 15 U/L (ref 10–40)
ALT SERPL-CCNC: 16 U/L (ref 10–40)
ANION GAP SERPL CALCULATED.3IONS-SCNC: 10 MMOL/L (ref 3–16)
ANION GAP SERPL CALCULATED.3IONS-SCNC: 14 MMOL/L (ref 3–16)
AST SERPL-CCNC: 15 U/L (ref 15–37)
AST SERPL-CCNC: 20 U/L (ref 15–37)
BASOPHILS ABSOLUTE: 0 K/UL (ref 0–0.2)
BASOPHILS ABSOLUTE: 0 K/UL (ref 0–0.2)
BASOPHILS RELATIVE PERCENT: 0.4 %
BASOPHILS RELATIVE PERCENT: 0.4 %
BILIRUB SERPL-MCNC: <0.2 MG/DL (ref 0–1)
BILIRUB SERPL-MCNC: <0.2 MG/DL (ref 0–1)
BILIRUBIN URINE: NEGATIVE
BLOOD, URINE: NEGATIVE
BUN BLDV-MCNC: 13 MG/DL (ref 7–20)
BUN BLDV-MCNC: 13 MG/DL (ref 7–20)
CALCIUM SERPL-MCNC: 8.8 MG/DL (ref 8.3–10.6)
CALCIUM SERPL-MCNC: 8.8 MG/DL (ref 8.3–10.6)
CHLORIDE BLD-SCNC: 100 MMOL/L (ref 99–110)
CHLORIDE BLD-SCNC: 98 MMOL/L (ref 99–110)
CLARITY: CLEAR
CO2: 30 MMOL/L (ref 21–32)
CO2: 34 MMOL/L (ref 21–32)
COLOR: YELLOW
CREAT SERPL-MCNC: 0.7 MG/DL (ref 0.6–1.2)
CREAT SERPL-MCNC: 0.8 MG/DL (ref 0.6–1.2)
EOSINOPHILS ABSOLUTE: 0 K/UL (ref 0–0.6)
EOSINOPHILS ABSOLUTE: 0.1 K/UL (ref 0–0.6)
EOSINOPHILS RELATIVE PERCENT: 0.6 %
EOSINOPHILS RELATIVE PERCENT: 1.2 %
GFR AFRICAN AMERICAN: >60
GFR AFRICAN AMERICAN: >60
GFR NON-AFRICAN AMERICAN: >60
GFR NON-AFRICAN AMERICAN: >60
GLOBULIN: 2.6 G/DL
GLOBULIN: 2.6 G/DL
GLUCOSE BLD-MCNC: 87 MG/DL (ref 70–99)
GLUCOSE BLD-MCNC: 91 MG/DL (ref 70–99)
GLUCOSE URINE: NEGATIVE MG/DL
HCT VFR BLD CALC: 26.9 % (ref 36–48)
HCT VFR BLD CALC: 27 % (ref 36–48)
HEMOGLOBIN: 8.5 G/DL (ref 12–16)
HEMOGLOBIN: 8.6 G/DL (ref 12–16)
KETONES, URINE: NEGATIVE MG/DL
LEUKOCYTE ESTERASE, URINE: NEGATIVE
LYMPHOCYTES ABSOLUTE: 0.5 K/UL (ref 1–5.1)
LYMPHOCYTES ABSOLUTE: 0.6 K/UL (ref 1–5.1)
LYMPHOCYTES RELATIVE PERCENT: 8.1 %
LYMPHOCYTES RELATIVE PERCENT: 8.9 %
MAGNESIUM: 1.5 MG/DL (ref 1.8–2.4)
MCH RBC QN AUTO: 27.3 PG (ref 26–34)
MCH RBC QN AUTO: 27.6 PG (ref 26–34)
MCHC RBC AUTO-ENTMCNC: 31.7 G/DL (ref 31–36)
MCHC RBC AUTO-ENTMCNC: 32 G/DL (ref 31–36)
MCV RBC AUTO: 86.1 FL (ref 80–100)
MCV RBC AUTO: 86.3 FL (ref 80–100)
MICROSCOPIC EXAMINATION: NORMAL
MONOCYTES ABSOLUTE: 0.5 K/UL (ref 0–1.3)
MONOCYTES ABSOLUTE: 0.6 K/UL (ref 0–1.3)
MONOCYTES RELATIVE PERCENT: 7.6 %
MONOCYTES RELATIVE PERCENT: 8.7 %
NEUTROPHILS ABSOLUTE: 5.2 K/UL (ref 1.7–7.7)
NEUTROPHILS ABSOLUTE: 5.6 K/UL (ref 1.7–7.7)
NEUTROPHILS RELATIVE PERCENT: 81.6 %
NEUTROPHILS RELATIVE PERCENT: 82.5 %
NITRITE, URINE: NEGATIVE
PDW BLD-RTO: 14.9 % (ref 12.4–15.4)
PDW BLD-RTO: 15.2 % (ref 12.4–15.4)
PH UA: 6.5 (ref 5–8)
PLATELET # BLD: 399 K/UL (ref 135–450)
PLATELET # BLD: 405 K/UL (ref 135–450)
PMV BLD AUTO: 7.3 FL (ref 5–10.5)
PMV BLD AUTO: 7.4 FL (ref 5–10.5)
POTASSIUM REFLEX MAGNESIUM: 3.5 MMOL/L (ref 3.5–5.1)
POTASSIUM SERPL-SCNC: 3.7 MMOL/L (ref 3.5–5.1)
PROTEIN UA: NEGATIVE MG/DL
RBC # BLD: 3.12 M/UL (ref 4–5.2)
RBC # BLD: 3.13 M/UL (ref 4–5.2)
SODIUM BLD-SCNC: 142 MMOL/L (ref 136–145)
SODIUM BLD-SCNC: 144 MMOL/L (ref 136–145)
SPECIFIC GRAVITY UA: 1.01 (ref 1–1.03)
TOTAL PROTEIN: 6 G/DL (ref 6.4–8.2)
TOTAL PROTEIN: 6.1 G/DL (ref 6.4–8.2)
URINE REFLEX TO CULTURE: NORMAL
URINE TYPE: NORMAL
UROBILINOGEN, URINE: 0.2 E.U./DL
WBC # BLD: 6.4 K/UL (ref 4–11)
WBC # BLD: 6.8 K/UL (ref 4–11)

## 2020-04-02 PROCEDURE — 80053 COMPREHEN METABOLIC PANEL: CPT

## 2020-04-02 PROCEDURE — 6370000000 HC RX 637 (ALT 250 FOR IP): Performed by: STUDENT IN AN ORGANIZED HEALTH CARE EDUCATION/TRAINING PROGRAM

## 2020-04-02 PROCEDURE — 2580000003 HC RX 258: Performed by: STUDENT IN AN ORGANIZED HEALTH CARE EDUCATION/TRAINING PROGRAM

## 2020-04-02 PROCEDURE — 2500000003 HC RX 250 WO HCPCS: Performed by: STUDENT IN AN ORGANIZED HEALTH CARE EDUCATION/TRAINING PROGRAM

## 2020-04-02 PROCEDURE — 92610 EVALUATE SWALLOWING FUNCTION: CPT

## 2020-04-02 PROCEDURE — 99232 SBSQ HOSP IP/OBS MODERATE 35: CPT | Performed by: HOSPITALIST

## 2020-04-02 PROCEDURE — 93308 TTE F-UP OR LMTD: CPT

## 2020-04-02 PROCEDURE — 94761 N-INVAS EAR/PLS OXIMETRY MLT: CPT

## 2020-04-02 PROCEDURE — 1200000000 HC SEMI PRIVATE

## 2020-04-02 PROCEDURE — 6370000000 HC RX 637 (ALT 250 FOR IP): Performed by: HOSPITALIST

## 2020-04-02 PROCEDURE — 6360000002 HC RX W HCPCS: Performed by: STUDENT IN AN ORGANIZED HEALTH CARE EDUCATION/TRAINING PROGRAM

## 2020-04-02 PROCEDURE — 83735 ASSAY OF MAGNESIUM: CPT

## 2020-04-02 PROCEDURE — 93320 DOPPLER ECHO COMPLETE: CPT

## 2020-04-02 PROCEDURE — 85025 COMPLETE CBC W/AUTO DIFF WBC: CPT

## 2020-04-02 PROCEDURE — 6360000002 HC RX W HCPCS: Performed by: HOSPITALIST

## 2020-04-02 PROCEDURE — 81003 URINALYSIS AUTO W/O SCOPE: CPT

## 2020-04-02 PROCEDURE — 93325 DOPPLER ECHO COLOR FLOW MAPG: CPT

## 2020-04-02 RX ORDER — PROMETHAZINE HYDROCHLORIDE 25 MG/1
12.5 TABLET ORAL EVERY 6 HOURS PRN
Status: DISCONTINUED | OUTPATIENT
Start: 2020-04-02 | End: 2020-04-04 | Stop reason: HOSPADM

## 2020-04-02 RX ORDER — LANOLIN ALCOHOL/MO/W.PET/CERES
1000 CREAM (GRAM) TOPICAL DAILY
Status: DISCONTINUED | OUTPATIENT
Start: 2020-04-02 | End: 2020-04-04 | Stop reason: HOSPADM

## 2020-04-02 RX ORDER — POLYETHYLENE GLYCOL 3350 17 G/17G
17 POWDER, FOR SOLUTION ORAL DAILY PRN
Status: DISCONTINUED | OUTPATIENT
Start: 2020-04-02 | End: 2020-04-04 | Stop reason: HOSPADM

## 2020-04-02 RX ORDER — AMLODIPINE BESYLATE 10 MG/1
10 TABLET ORAL DAILY
Status: DISCONTINUED | OUTPATIENT
Start: 2020-04-02 | End: 2020-04-04 | Stop reason: HOSPADM

## 2020-04-02 RX ORDER — ACETAMINOPHEN 650 MG/1
650 SUPPOSITORY RECTAL EVERY 6 HOURS PRN
Status: DISCONTINUED | OUTPATIENT
Start: 2020-04-02 | End: 2020-04-04 | Stop reason: HOSPADM

## 2020-04-02 RX ORDER — ASPIRIN 81 MG/1
81 TABLET ORAL DAILY
Status: DISCONTINUED | OUTPATIENT
Start: 2020-04-02 | End: 2020-04-04 | Stop reason: HOSPADM

## 2020-04-02 RX ORDER — POTASSIUM CHLORIDE 20 MEQ/1
20 TABLET, EXTENDED RELEASE ORAL 2 TIMES DAILY WITH MEALS
Status: DISCONTINUED | OUTPATIENT
Start: 2020-04-02 | End: 2020-04-03

## 2020-04-02 RX ORDER — ATORVASTATIN CALCIUM 10 MG/1
10 TABLET, FILM COATED ORAL DAILY
Status: DISCONTINUED | OUTPATIENT
Start: 2020-04-02 | End: 2020-04-04 | Stop reason: HOSPADM

## 2020-04-02 RX ORDER — ACETAMINOPHEN 325 MG/1
650 TABLET ORAL EVERY 6 HOURS PRN
Status: DISCONTINUED | OUTPATIENT
Start: 2020-04-02 | End: 2020-04-04 | Stop reason: HOSPADM

## 2020-04-02 RX ORDER — FUROSEMIDE 10 MG/ML
20 INJECTION INTRAMUSCULAR; INTRAVENOUS 2 TIMES DAILY
Status: DISCONTINUED | OUTPATIENT
Start: 2020-04-02 | End: 2020-04-04 | Stop reason: HOSPADM

## 2020-04-02 RX ORDER — ONDANSETRON 2 MG/ML
4 INJECTION INTRAMUSCULAR; INTRAVENOUS EVERY 6 HOURS PRN
Status: DISCONTINUED | OUTPATIENT
Start: 2020-04-02 | End: 2020-04-04 | Stop reason: HOSPADM

## 2020-04-02 RX ORDER — MAGNESIUM SULFATE IN WATER 40 MG/ML
2 INJECTION, SOLUTION INTRAVENOUS ONCE
Status: DISCONTINUED | OUTPATIENT
Start: 2020-04-02 | End: 2020-04-04 | Stop reason: HOSPADM

## 2020-04-02 RX ORDER — SACCHAROMYCES BOULARDII 250 MG
250 CAPSULE ORAL 2 TIMES DAILY
Status: DISCONTINUED | OUTPATIENT
Start: 2020-04-02 | End: 2020-04-04 | Stop reason: HOSPADM

## 2020-04-02 RX ORDER — DONEPEZIL HYDROCHLORIDE 10 MG/1
10 TABLET, FILM COATED ORAL NIGHTLY
Status: DISCONTINUED | OUTPATIENT
Start: 2020-04-02 | End: 2020-04-04 | Stop reason: HOSPADM

## 2020-04-02 RX ORDER — MAGNESIUM SULFATE IN WATER 40 MG/ML
2 INJECTION, SOLUTION INTRAVENOUS ONCE
Status: COMPLETED | OUTPATIENT
Start: 2020-04-02 | End: 2020-04-02

## 2020-04-02 RX ORDER — LANOLIN ALCOHOL/MO/W.PET/CERES
400 CREAM (GRAM) TOPICAL DAILY
Status: DISCONTINUED | OUTPATIENT
Start: 2020-04-02 | End: 2020-04-03

## 2020-04-02 RX ORDER — FAMOTIDINE 20 MG/1
20 TABLET, FILM COATED ORAL DAILY
Status: DISCONTINUED | OUTPATIENT
Start: 2020-04-02 | End: 2020-04-04 | Stop reason: HOSPADM

## 2020-04-02 RX ADMIN — APIXABAN 2.5 MG: 2.5 TABLET, FILM COATED ORAL at 08:50

## 2020-04-02 RX ADMIN — ASPIRIN 81 MG: 81 TABLET, COATED ORAL at 09:07

## 2020-04-02 RX ADMIN — FUROSEMIDE 20 MG: 10 INJECTION, SOLUTION INTRAMUSCULAR; INTRAVENOUS at 17:24

## 2020-04-02 RX ADMIN — METOPROLOL TARTRATE 12.5 MG: 25 TABLET, FILM COATED ORAL at 21:39

## 2020-04-02 RX ADMIN — POTASSIUM CHLORIDE 20 MEQ: 20 TABLET, EXTENDED RELEASE ORAL at 17:24

## 2020-04-02 RX ADMIN — MICONAZOLE NITRATE: 20 POWDER TOPICAL at 21:39

## 2020-04-02 RX ADMIN — Medication 10 ML: at 08:51

## 2020-04-02 RX ADMIN — APIXABAN 2.5 MG: 2.5 TABLET, FILM COATED ORAL at 21:40

## 2020-04-02 RX ADMIN — MAGNESIUM SULFATE HEPTAHYDRATE 2 G: 40 INJECTION, SOLUTION INTRAVENOUS at 08:49

## 2020-04-02 RX ADMIN — DONEPEZIL HYDROCHLORIDE 10 MG: 10 TABLET, FILM COATED ORAL at 21:39

## 2020-04-02 RX ADMIN — Medication 250 MG: at 21:39

## 2020-04-02 RX ADMIN — Medication 10 ML: at 21:40

## 2020-04-02 RX ADMIN — POTASSIUM CHLORIDE 20 MEQ: 20 TABLET, EXTENDED RELEASE ORAL at 08:50

## 2020-04-02 RX ADMIN — CYANOCOBALAMIN TAB 1000 MCG 1000 MCG: 1000 TAB at 08:50

## 2020-04-02 RX ADMIN — Medication 250 MG: at 10:13

## 2020-04-02 RX ADMIN — METOPROLOL TARTRATE 12.5 MG: 25 TABLET, FILM COATED ORAL at 08:50

## 2020-04-02 RX ADMIN — FUROSEMIDE 20 MG: 10 INJECTION, SOLUTION INTRAMUSCULAR; INTRAVENOUS at 08:50

## 2020-04-02 RX ADMIN — FAMOTIDINE 20 MG: 20 TABLET, FILM COATED ORAL at 08:50

## 2020-04-02 RX ADMIN — Medication 10 ML: at 17:25

## 2020-04-02 RX ADMIN — ATORVASTATIN CALCIUM 10 MG: 10 TABLET, FILM COATED ORAL at 08:51

## 2020-04-02 RX ADMIN — AMLODIPINE BESYLATE 10 MG: 10 TABLET ORAL at 08:50

## 2020-04-02 ASSESSMENT — PAIN SCALES - GENERAL
PAINLEVEL_OUTOF10: 0

## 2020-04-02 NOTE — PROGRESS NOTES
Speech Language Pathology  Facility/Department: [address]   CLINICAL BEDSIDE SWALLOW EVALUATION    NAME: Mariam Hawthorne  : 1923  MRN: [id number]    ADMISSION DATE: 2020  ADMITTING DIAGNOSIS: has Essential hypertension; Macular degeneration of both eyes; CAD (coronary artery disease); Hyperlipidemia; Depressive disorder; Osteoporosis; Cognitive impairment; Hypokalemia; Trigger finger of left hand; NSTEMI (non-ST elevated myocardial infarction) ([address] Nonrheumatic aortic valve insufficiency; Paroxysmal atrial fibrillation ([address] Anemia; Acute hypoxemic respiratory failure ([address] Nonrheumatic mitral valve regurgitation; Acute on chronic diastolic heart failure ([address] Pulmonary nodule, right; Bilateral pleural effusion; Chronic atrial fibrillation; and Hypomagnesemia on their problem list.  ONSET DATE: 2020    Recent Chest Xray 2020  1. New small bilateral pleural effusions with bibasilar airspace disease/atelectasis. Possibility of bibasilar pneumonia cannot be excluded although the findings at the lung bases could simply be related to pleural effusions with atelectasis.         CT of Chest 2020  1.  Moderate bilateral pleural effusions and associated atelectasis, new compared to 3/20/2019. 2.  Persistent left hemidiaphragmatic elevation. 3.  Bilateral pulmonary nodules measuring up to 8 mm in size.  Recommend follow-up CT chest in 6-12 months as recommended on CT chest dated 3/20/2019.           Date of Eval: 2020  Evaluating Therapist: Alicia Rivas    Current Diet level:  Current Diet : Regular  Current Liquid Diet : Thin    Primary Complaint  Patient Complaint: pt denies any problems with swallowing    Pain: denies    Reason for Referral  Mariam Hawthorne was referred for a bedside swallow evaluation to assess the efficiency of her swallow function, identify signs and symptoms of aspiration and make recommendations regarding safe dietary consistencies, effective

## 2020-04-02 NOTE — PROGRESS NOTES
Perfect served MD regarding clarification of order that patient was placed under droplet and contact precaution but no order for resp panel or pending rule out. Per Dr. Jevon Chung he will look into it. Updated charge RN.

## 2020-04-02 NOTE — PROGRESS NOTES
home Metoprolol    Essential HTN  -Continue home amlodipine and metoprolol     Chronic Problems:  ## Chronic A-fib - home Eliquis  ## CAD, HLD - home ASA, statin  ## Dementia - home Donepezil  ## GERD - home Famotidine     Code Status:  DNR-CCA  FEN:  General  PPX:  Eliquis   DISPO:  Celestino Oakes MD, PGY-1  04/02/20  9:09 AM    This patient has been staffed and discussed with Sravani Wynn MD.     Addendum to Resident H& P/Progress note:  I have personally seen,examined and evaluated the patient. I have reviewed the current history, physical findings, labs and assessment and plan and agree with note as documented by resident MD ( )  + Hypokalemia and hypomagnesemia; will replace  Discussed the patient's condition with her registered nurse, Swapna Varghese.     Sravani Wynn MD, FACP

## 2020-04-02 NOTE — CARE COORDINATION
Case Management Assessment           Initial Evaluation                Date / Time of Evaluation: 4/2/2020 2:25 PM                 Assessment Completed by: Micheal Wise    Patient Name: Ronan Haro     YOB: 1923  Diagnosis: SOB (shortness of breath) [R06.02]  SOB (shortness of breath) [R06.02]     Date / Time: 4/1/2020  1:34 PM    Patient Admission Status: Inpatient    If patient is discharged prior to next notation, then this note serves as note for discharge by case management. Current PCP: Teressa Ewing MD  Clinic Patient: No    Chart Reviewed: Yes  Patient/ Family Interviewed: No due to COVID-19/chart review only    Initial assessment completed at bedside with:     Hospitalization in the last 30 days: No    Emergency Contacts:  Extended Emergency Contact Information  Primary Emergency Contact: Lizbeth Mcghee [address] Phone: [phone]  Mobile Phone: [phone]  Relation: Child  Secondary Emergency Contact: alirio laina  [place] Phone: [phone]  Mobile Phone: [phone]  Relation: Other   needed? No    Advance Directives:   Code Status: DNR-CCA      Financial  Payor: MEDICARE / Plan: MEDICARE PART A AND B / Product Type: *No Product type* /     Pre-cert required for SNF: No    Pharmacy    [address] [place] [address]  Phone: [phone] Fax: [phone]      Potential assistance Purchasing Medications: Potential Assistance Purchasing Medications: No  Does Patient want to participate in local refill/ meds to beds program?: No    Meds To Beds General Rules:  1. Can ONLY be done Monday- Friday between 8:30am-5pm  2. Prescription(s) must be in pharmacy by 3pm to be filled same day  3. Copy of patient's insurance/ prescription drug card and patient face sheet must be sent along with the prescription(s)  4.  Cost of Rx cannot be added to hospital bill. If financial assistance is needed, please contact unit  or ;  or  CANNOT provide pharmacy voucher for patients co-pays  5. Patients can then  the prescription on their way out of the hospital at discharge, or pharmacy can deliver to the bedside if staff is available. (payment due at time of pick-up or delivery - cash, check, or card accepted)     Able to afford home medications/ co-pay costs: Yes    ADLS  Support Systems: ECF/Assisted Living, Children    PT AM-PAC:   /24  OT AM-PAC:   /24    [facility]: The [address] [place] [address] with 24 hr private duty care  Steps:     Plans to RETURN to current housing: Yes  Barriers to RETURNING to current housing:     [address]ai [address]  [facility] [address] [facility] [address]: No  But has had [facility] in the past  [address]: Not Applicable    Currently ACTIVE with [place] [facility]: No  Passport/ Waiver: No  Passport/ Waiver Services: Not Applicable    Durable Medical Equipment  DME Provider:   Equipment: has motorized wheelchair    Home Oxygen and [address] prior to admission: No  [address]: Not Applicable  Other Respiratory Equipment:     Informed of need to bring portable home O2 tank on day of DISCHARGE for nursing to connect prior to leaving:   Verbalized agreement/Understanding:   Person to bring portable tank at discharge:     Dialysis  Active with HD/PD prior to admission: No  Nephrologist:     HD Center:  Not Applicable    DISCHARGE PLAN:  Disposition: return to [place] [address] with 24 hr private duty care. Transportation PLAN for discharge: private caregiver     Factors facilitating achievement of predicted outcomes: Caregiver support    Barriers to discharge: Additional Case Management Notes:      The Plan for Transition of Care is related to the following treatment goals of SOB (shortness of breath) [R06.02]  SOB (shortness of breath) [R06.02]    The Patient and/or patient representative Lizzie and her family were provided with a choice of provider and agrees with the discharge plan Yes    Freedom of choice list was provided with basic dialogue that supports the patient's individualized plan of care/goals and shares the quality data associated with the providers.  Yes      Care Transition patient: No    Tata Schaffer  Case Management Department  Ph: [phone]

## 2020-04-03 ENCOUNTER — APPOINTMENT (OUTPATIENT)
Dept: GENERAL RADIOLOGY | Age: 85
DRG: 291 | End: 2020-04-03
Payer: MEDICARE

## 2020-04-03 LAB
A/G RATIO: 1.6 (ref 1.1–2.2)
ALBUMIN SERPL-MCNC: 3.7 G/DL (ref 3.4–5)
ALP BLD-CCNC: 56 U/L (ref 40–129)
ALT SERPL-CCNC: 13 U/L (ref 10–40)
ANION GAP SERPL CALCULATED.3IONS-SCNC: 8 MMOL/L (ref 3–16)
AST SERPL-CCNC: 15 U/L (ref 15–37)
BASOPHILS ABSOLUTE: 0 K/UL (ref 0–0.2)
BASOPHILS RELATIVE PERCENT: 0.6 %
BILIRUB SERPL-MCNC: <0.2 MG/DL (ref 0–1)
BUN BLDV-MCNC: 14 MG/DL (ref 7–20)
CALCIUM SERPL-MCNC: 8.7 MG/DL (ref 8.3–10.6)
CHLORIDE BLD-SCNC: 97 MMOL/L (ref 99–110)
CO2: 37 MMOL/L (ref 21–32)
CREAT SERPL-MCNC: 0.8 MG/DL (ref 0.6–1.2)
EOSINOPHILS ABSOLUTE: 0.1 K/UL (ref 0–0.6)
EOSINOPHILS RELATIVE PERCENT: 1.3 %
GFR AFRICAN AMERICAN: >60
GFR NON-AFRICAN AMERICAN: >60
GLOBULIN: 2.3 G/DL
GLUCOSE BLD-MCNC: 95 MG/DL (ref 70–99)
HCT VFR BLD CALC: 28.2 % (ref 36–48)
HEMOGLOBIN: 9.2 G/DL (ref 12–16)
LYMPHOCYTES ABSOLUTE: 0.6 K/UL (ref 1–5.1)
LYMPHOCYTES RELATIVE PERCENT: 8.5 %
MCH RBC QN AUTO: 27.8 PG (ref 26–34)
MCHC RBC AUTO-ENTMCNC: 32.5 G/DL (ref 31–36)
MCV RBC AUTO: 85.5 FL (ref 80–100)
MONOCYTES ABSOLUTE: 0.8 K/UL (ref 0–1.3)
MONOCYTES RELATIVE PERCENT: 11.4 %
NEUTROPHILS ABSOLUTE: 5.5 K/UL (ref 1.7–7.7)
NEUTROPHILS RELATIVE PERCENT: 78.2 %
PDW BLD-RTO: 15.2 % (ref 12.4–15.4)
PLATELET # BLD: 402 K/UL (ref 135–450)
PMV BLD AUTO: 7 FL (ref 5–10.5)
POTASSIUM REFLEX MAGNESIUM: 4.2 MMOL/L (ref 3.5–5.1)
RBC # BLD: 3.3 M/UL (ref 4–5.2)
SODIUM BLD-SCNC: 142 MMOL/L (ref 136–145)
TOTAL PROTEIN: 6 G/DL (ref 6.4–8.2)
WBC # BLD: 7 K/UL (ref 4–11)

## 2020-04-03 PROCEDURE — 80053 COMPREHEN METABOLIC PANEL: CPT

## 2020-04-03 PROCEDURE — 6370000000 HC RX 637 (ALT 250 FOR IP): Performed by: STUDENT IN AN ORGANIZED HEALTH CARE EDUCATION/TRAINING PROGRAM

## 2020-04-03 PROCEDURE — 94680 O2 UPTK RST&XERS DIR SIMPLE: CPT

## 2020-04-03 PROCEDURE — 97166 OT EVAL MOD COMPLEX 45 MIN: CPT

## 2020-04-03 PROCEDURE — 1200000000 HC SEMI PRIVATE

## 2020-04-03 PROCEDURE — 97530 THERAPEUTIC ACTIVITIES: CPT

## 2020-04-03 PROCEDURE — 85025 COMPLETE CBC W/AUTO DIFF WBC: CPT

## 2020-04-03 PROCEDURE — 6360000002 HC RX W HCPCS: Performed by: HOSPITALIST

## 2020-04-03 PROCEDURE — 92526 ORAL FUNCTION THERAPY: CPT

## 2020-04-03 PROCEDURE — 97162 PT EVAL MOD COMPLEX 30 MIN: CPT

## 2020-04-03 PROCEDURE — 2700000000 HC OXYGEN THERAPY PER DAY

## 2020-04-03 PROCEDURE — 97116 GAIT TRAINING THERAPY: CPT

## 2020-04-03 PROCEDURE — 94761 N-INVAS EAR/PLS OXIMETRY MLT: CPT

## 2020-04-03 PROCEDURE — 99232 SBSQ HOSP IP/OBS MODERATE 35: CPT | Performed by: HOSPITALIST

## 2020-04-03 PROCEDURE — 6370000000 HC RX 637 (ALT 250 FOR IP): Performed by: HOSPITALIST

## 2020-04-03 PROCEDURE — 2580000003 HC RX 258: Performed by: STUDENT IN AN ORGANIZED HEALTH CARE EDUCATION/TRAINING PROGRAM

## 2020-04-03 PROCEDURE — 97535 SELF CARE MNGMENT TRAINING: CPT

## 2020-04-03 PROCEDURE — 36415 COLL VENOUS BLD VENIPUNCTURE: CPT

## 2020-04-03 PROCEDURE — 71045 X-RAY EXAM CHEST 1 VIEW: CPT

## 2020-04-03 RX ORDER — FUROSEMIDE 20 MG/1
20 TABLET ORAL 2 TIMES DAILY
Qty: 60 TABLET | Refills: 0 | Status: SHIPPED | OUTPATIENT
Start: 2020-04-04 | End: 2020-04-04

## 2020-04-03 RX ORDER — FUROSEMIDE 20 MG/1
20 TABLET ORAL 2 TIMES DAILY
Status: DISCONTINUED | OUTPATIENT
Start: 2020-04-04 | End: 2020-04-04 | Stop reason: HOSPADM

## 2020-04-03 RX ORDER — LANOLIN ALCOHOL/MO/W.PET/CERES
400 CREAM (GRAM) TOPICAL DAILY
Qty: 30 TABLET | Refills: 0 | Status: SHIPPED | OUTPATIENT
Start: 2020-04-04 | End: 2020-04-04 | Stop reason: HOSPADM

## 2020-04-03 RX ORDER — POTASSIUM CHLORIDE 20 MEQ/1
20 TABLET, EXTENDED RELEASE ORAL 2 TIMES DAILY WITH MEALS
Qty: 60 TABLET | Refills: 0 | Status: SHIPPED | OUTPATIENT
Start: 2020-04-03 | End: 2020-04-04 | Stop reason: HOSPADM

## 2020-04-03 RX ADMIN — Medication 250 MG: at 20:19

## 2020-04-03 RX ADMIN — ASPIRIN 81 MG: 81 TABLET, COATED ORAL at 08:30

## 2020-04-03 RX ADMIN — DONEPEZIL HYDROCHLORIDE 10 MG: 10 TABLET, FILM COATED ORAL at 20:19

## 2020-04-03 RX ADMIN — AMLODIPINE BESYLATE 10 MG: 10 TABLET ORAL at 08:30

## 2020-04-03 RX ADMIN — ACETAMINOPHEN 650 MG: 325 TABLET ORAL at 13:44

## 2020-04-03 RX ADMIN — FAMOTIDINE 20 MG: 20 TABLET, FILM COATED ORAL at 08:35

## 2020-04-03 RX ADMIN — APIXABAN 2.5 MG: 2.5 TABLET, FILM COATED ORAL at 20:19

## 2020-04-03 RX ADMIN — MICONAZOLE NITRATE: 20 POWDER TOPICAL at 20:20

## 2020-04-03 RX ADMIN — ATORVASTATIN CALCIUM 10 MG: 10 TABLET, FILM COATED ORAL at 08:31

## 2020-04-03 RX ADMIN — FUROSEMIDE 20 MG: 10 INJECTION, SOLUTION INTRAMUSCULAR; INTRAVENOUS at 17:23

## 2020-04-03 RX ADMIN — Medication 250 MG: at 08:30

## 2020-04-03 RX ADMIN — MICONAZOLE NITRATE: 20 POWDER TOPICAL at 08:36

## 2020-04-03 RX ADMIN — APIXABAN 2.5 MG: 2.5 TABLET, FILM COATED ORAL at 08:30

## 2020-04-03 RX ADMIN — Medication 400 MG: at 09:02

## 2020-04-03 RX ADMIN — CYANOCOBALAMIN TAB 1000 MCG 1000 MCG: 1000 TAB at 08:31

## 2020-04-03 RX ADMIN — Medication 10 ML: at 20:26

## 2020-04-03 RX ADMIN — METOPROLOL TARTRATE 12.5 MG: 25 TABLET, FILM COATED ORAL at 08:30

## 2020-04-03 RX ADMIN — Medication 10 ML: at 08:37

## 2020-04-03 RX ADMIN — POTASSIUM CHLORIDE 20 MEQ: 20 TABLET, EXTENDED RELEASE ORAL at 08:30

## 2020-04-03 RX ADMIN — METOPROLOL TARTRATE 12.5 MG: 25 TABLET, FILM COATED ORAL at 20:19

## 2020-04-03 RX ADMIN — FUROSEMIDE 20 MG: 10 INJECTION, SOLUTION INTRAMUSCULAR; INTRAVENOUS at 08:36

## 2020-04-03 ASSESSMENT — PAIN SCALES - GENERAL
PAINLEVEL_OUTOF10: 0
PAINLEVEL_OUTOF10: 6
PAINLEVEL_OUTOF10: 0

## 2020-04-03 NOTE — PROGRESS NOTES
[facility]    Respiratory Therapy     Home Oxygen Evaluation        Name: Barry Frey  Record Number: [id number]  Age: 80 y.o. Gender:  female   : 1923  Today's date: 4/3/2020  Room: 6320/6320-01      Assessment        /63   Pulse 74   Temp 98.1 °F (36.7 °C) (Oral)   Resp 16   Ht 5' 3\" (1.6 m)   Wt 125 lb 8 oz (56.9 kg)   SpO2 94%   BMI 22.23 kg/m²     Patient Active Problem List   Diagnosis    Essential hypertension    Macular degeneration of both eyes    CAD (coronary artery disease)    Hyperlipidemia    Depressive disorder    Osteoporosis    Cognitive impairment    Hypokalemia    Trigger finger of left hand    NSTEMI (non-ST elevated myocardial infarction) (HCC)    Nonrheumatic aortic valve insufficiency    Paroxysmal atrial fibrillation (HCC)    Anemia    Acute hypoxemic respiratory failure (HCC)    Nonrheumatic mitral valve regurgitation    Acute on chronic diastolic heart failure (HCC)    Pulmonary nodule, right    Bilateral pleural effusion    Chronic atrial fibrillation    Hypomagnesemia    Physical deconditioning       Social History:  Social History     Tobacco Use    Smoking status: Former Smoker    Smokeless tobacco: Never Used    Tobacco comment: Quit smoking 30 years ago. Substance Use Topics    Alcohol use: No     Alcohol/week: 0.0 standard drinks    Drug use: No       Patient Room Air saturation at rest 84  %  Patient Room Air saturation upon ambulation 82 %    Oxygen saturations of 88% or less on RA qualifies patient for Home Oxygen    Patient resting on 2  lmp  with an oxygen saturation of  94 %     Patient ambulated on 2 lpm with an oxygen saturation of 92%    Qualifying patient for home oxygen with ambulation and continuous flow  @ 2 lpm.      In your clinical assessment does the Patient Require Portable Oxygen Tanks?     Yes               Patient/caregiver was educated on Home Oxygen process:  Yes      Level of patient/caregiver understanding able to:   [] Verbalize understanding   [] Demonstrate understanding       [] Teach back        [] Needs reinforcement        []  No available caregiver               []  Other:     Response to education:  Very Good     Time Spent with Home O2 Set Up:  30  minutes     EVITA Limon on 4/3/2020 at 2:46 PM                 .

## 2020-04-03 NOTE — PROGRESS NOTES
Progress Note    Admit Date: 4/1/2020  Day: 3  Diet: DIET GENERAL;    CC: SOB    Interval history: Feels well this AM. Reports improvement in SOB, On 2 L O2 now. Denies CP, palpatations, Abd pain, N/V.       HPI: Lucrecia Atkinson is a 80 y.o. female, with a history of Afib (on Eliquis), CAD, Mitral regurgitation, HTN, HLD, Hiatal hernia, UTI, & Macular Degeneration, who presented with Shortness of Breath. Apparently she looked like she was having trouble breathing and was hypoxic into the 80s. EMS was called to bring her to the hospital. Here the patient has no complaints. She stated that she does not feel short of breath and does not have a cough and does not have a fever. Of note, patient has some dementia. No chest pain, nausea, vomiting, abdominal pain, fever, chills, headache, or leg pain. She does not wear oxygen at home for any reason.      Medications:     Scheduled Meds:   amLODIPine  10 mg Oral Daily    aspirin  81 mg Oral Daily    donepezil  10 mg Oral Nightly    apixaban  2.5 mg Oral BID    famotidine  20 mg Oral Daily    metoprolol tartrate  12.5 mg Oral BID    atorvastatin  10 mg Oral Daily    vitamin B-12  1,000 mcg Oral Daily    potassium chloride  20 mEq Oral BID WC    magnesium oxide  400 mg Oral Daily    furosemide  20 mg Intravenous BID    magnesium sulfate  2 g Intravenous Once    saccharomyces boulardii  250 mg Oral BID    miconazole   Topical BID    sodium chloride flush  10 mL Intravenous 2 times per day     Continuous Infusions:  PRN Meds:acetaminophen **OR** acetaminophen, polyethylene glycol, promethazine **OR** ondansetron, sodium chloride flush, perflutren lipid microspheres    Objective:   Vitals:   T-max:  Patient Vitals for the past 8 hrs:   BP Temp Temp src Pulse Resp SpO2   04/03/20 0841 124/63 98.1 °F (36.7 °C) Oral 74 16 --   04/03/20 0400 109/61 97.7 °F (36.5 °C) Oral 75 16 95 %       Intake/Output Summary (Last 24 hours) at 4/3/2020 0858  Last data filed at 4/3/2020 Cultures:  -----------------------------------------------------------------  RAD:   CT CHEST WO CONTRAST   Final Result      1. Moderate bilateral pleural effusions and associated atelectasis, new compared to 3/20/2019.   2.  Persistent left hemidiaphragmatic elevation. 3.  Bilateral pulmonary nodules measuring up to 8 mm in size. Recommend follow-up CT chest in 6-12 months as recommended on CT chest dated 3/20/2019. XR CHEST STANDARD (2 VW)   Final Result   1. New small bilateral pleural effusions with bibasilar airspace disease/atelectasis. Possibility of bibasilar pneumonia cannot be excluded although the findings at the lung bases could simply be related to pleural effusions with atelectasis. XR CHEST PORTABLE    (Results Pending)       Assessment/Plan:     Jaja Multani is a 80 y.o. female, with a history of Afib (on Eliquis), CAD, Mitral regurgitation, HTN, HLD, Hiatal hernia, UTI, & Macular Degeneration, who presented with Shortness of Breath.     ## Acute Hypoxic Respiratory Failure 2/2 chronic diastolic heart failure exacerbation - hypoxic to 82% sat on Pulse Ox on arrival, requring 1-2 L NC O2. . CXR: New small bilateral pleural effusions with bibasilar airspace disease/atelectasis. Possibility of bibasilar pneumonia cannot be excluded although the findings at the lung bases could simply be related to pleural effusions with atelectasis. Pro-BNP is 2,300, seems to be close to baseline; patient has BL 1+ LE edema. Patient has been staying home alone and mostly just has contact with her home health nurse, who is reportedly healthy at this time. -- Droplet & contact precautions  -- Supplemental O2, prn  -- CT Chest WO:  Bilateral pleural effusions. No consolidation  -- Blood Cx:   pending  -- Lactate normal, procal 0.30  -- LDH 2.3, CRP 9, Ferritin, 37.7  -- Lasix 20 IV BID. -- Repear CXR ordered. -4/3: Pt unable to be weaned off supplemental O2 today.  Spoke to attending Physician

## 2020-04-03 NOTE — DISCHARGE SUMMARY
INTERNAL MEDICINE DEPARTMENT AT [address] [place] [address]  DISCHARGE SUMMARY    Patient ID: Anthony Calvin                                             Discharge Date: 4/4/2020   Patient's PCP: Brice Sierra MD                                          Discharge Physician: Cris Cabral MD  Admit Date: 4/1/2020   Admitting Physician: Thiago Potter MD      DISCHARGE DIAGNOSES:  1-Acute Hypoxic Respiratory Failure 2/2acute on chronic diastolic heart failure exacerbation  2-Acute on  Chronic Diastolic Heart Failure  3-Essential HTN  4-Chronic A-fib on eliquis  5-CAD  6-HLD  7-Dementia  8-GERD    Hospital Course:      Caro Mccarty is a 80 y. o. female, with a history of Afib (on Eliquis), CAD, Mitral regurgitation, HTN, HLD, Hiatal hernia, UTI, & Macular Degeneration, who presented with Shortness of Breath. Apparently she looked like she was having trouble breathing and was hypoxic into the 80s. Diagnosed with Acute Hypoxic Resp failure 2/2 CHF exacerbation. Infectious workup negative, Abx were not continued from ED. SOB improved with IV lasix, weaned off supplemental O2 at rest. Home O2 eval pending, will set up home O2 if needed. Physical Exam:  BP (!) 104/58   Pulse 85   Temp 97.3 °F (36.3 °C) (Oral)   Resp 16   Ht 5' 3\" (1.6 m)   Wt 110 lb 10.7 oz (50.2 kg)   SpO2 97%   BMI 19.60 kg/m²   General appearance: alert, appears stated age and cooperative  Head: Normocephalic, without obvious abnormality, atraumatic  Eyes: conjunctivae/corneas clear. PERRL, EOM's intact. Fundi benign. Ears: normal TM's and external ear canals both ears  Nose: Nares normal. Septum midline. Mucosa normal. No drainage or sinus tenderness.   Throat: lips, mucosa, and tongue normal; teeth and gums normal  Neck: no adenopathy, no carotid bruit, no JVD, supple, symmetrical, trachea midline and thyroid not enlarged, symmetric, no tenderness/mass/nodules  Lungs: clear to auscultation bilaterally  Heart: regular rate and rhythm, S1, S2 normal, tablet  Commonly known as:  ZOCOR  1 TABLET BY MOUTH AT BEDTIME * NO GRAPEFRUIT *     vitamin B-12 1000 MCG tablet  Commonly known as:  CYANOCOBALAMIN  Take 1 tablet by mouth daily           Where to Get Your Medications      These medications were sent to [facility], [address] [facility] [address]    Phone:  [phone]   · amLODIPine 10 MG tablet  · furosemide 20 MG tablet       Activity: activity as tolerated  Diet: cardiac diet    Time Spent on discharge is more than 30 minutes    Signed:  Ronal Verma MD, PGY-3   4/4/2020    Addendum to Resident H& P/Progress note:  I have personally seen,examined and evaluated the patient.  I have reviewed the current history, physical findings, labs and assessment and plan and agree with note as documented by resident MD ( )      José Antonio Morgan MD, Naila Good

## 2020-04-03 NOTE — DISCHARGE INSTR - COC
Continuity of Care Form    Patient Name: Jewels Hardy   :  1923  MRN:  [id number]    Admit date:  2020  Discharge date:  ***    Code Status Order: DNR-CCA   Advance Directives:   [facility] Documentation     Date/Time Healthcare Directive Type of Healthcare Directive Copy in [facility] [address] Agent's Name Healthcare Agent's Phone Number    201  Yes, patient has an advance directive for healthcare treatment pts daughter per pt  --  --  --  --  --          Admitting Physician:  Bk Arellano MD  PCP: Tyler Alcantara MD    Discharging Nurse: [facility] Unit/Room#: 7287/4549-44  Discharging Unit Phone Number: ***    Emergency Contact:   Extended Emergency Contact Information  Primary Emergency Contact: [place] [address] Phone: [phone]  Mobile Phone: [phone]  Relation: Child  Secondary Emergency Contact: jojo amezquitayn  [place] Phone: [phone]  Mobile Phone: [phone]  Relation: Other   needed?  No    Past Surgical History:  Past Surgical History:   Procedure Laterality Date    APPENDECTOMY      BLADDER REPAIR  Bladder tuck    Bladder tuck    BLADDER SUSPENSION  2008    [place]    CARDIOVASCULAR STRESS TEST      negative - good EF    CHOLECYSTECTOMY      DIAGNOSTIC CARDIAC CATH LAB PROCEDURE      DOPPLER ECHOCARDIOGRAPHY  10/13    normal EF    JOINT REPLACEMENT      TONSILLECTOMY AND ADENOIDECTOMY         Immunization History:   Immunization History   Administered Date(s) Administered    Influenza Vaccine, unspecified formulation 10/11/2012, 10/15/2015    Influenza, High Dose (Fluzone 65 yrs and older) 2013, 10/15/2014, 2018       Active Problems:  Patient Active Problem List   Diagnosis Code    Essential hypertension I10    Macular degeneration of both eyes H35.30    CAD (coronary artery disease) I25.10    Hyperlipidemia E78.5    Depressive disorder F32.9    Osteoporosis M81.0    Cognitive impairment R41.89    Hypokalemia E87.6    Trigger finger of left hand M65.30    NSTEMI (non-ST elevated myocardial infarction) ([facility]) I21.4    Nonrheumatic aortic valve insufficiency I35.1    Paroxysmal atrial fibrillation ([facility]) I48.0    Anemia D64.9    Acute hypoxemic respiratory failure ([facility]) J96.01    Nonrheumatic mitral valve regurgitation I34.0    Acute on chronic diastolic heart failure ([facility]) I50.33    Pulmonary nodule, right R91.1    Bilateral pleural effusion J90    Chronic atrial fibrillation I48.20    Hypomagnesemia E83.42       Isolation/Infection:   Isolation          No Isolation        Patient Infection Status     None to display          Nurse Assessment:  Last Vital Signs: /63   Pulse 74   Temp 98.1 °F (36.7 °C) (Oral)   Resp 16   Ht 5' 3\" (1.6 m)   Wt 125 lb 8 oz (56.9 kg)   SpO2 (!) 79%   BMI 22.23 kg/m²     Last documented pain score (0-10 scale): Pain Level: 0  Last Weight:   Wt Readings from Last 1 Encounters:   04/01/20 125 lb 8 oz (56.9 kg)     Mental Status:  {IP PT MENTAL STATUS:20030}    IV Access:  { ELISHA IV ACCESS:776082112}    Nursing Mobility/ADLs:  Walking   {[facility] DME KYZD:767666231}  Transfer  {[facility] DME HULF:086461294}  Bathing  {[facility] DME DKXU:748645881}  Dressing  {[facility] DME SIUW:242803902}  Toileting  {[facility] DME SNXN:993008645}  Feeding  {[facility] DME JVTM:097277596}  Med Admin  {[facility] DME DIHB:918047208}  Med Delivery   { ELISHA MED Delivery:136867657}    Wound Care Documentation and Therapy:        Elimination:  Continence:   · Bowel: {YES / QU:77404}  · Bladder: {YES / FJ:04598}  Urinary Catheter: {Urinary Catheter:191686454}   Colostomy/Ileostomy/Ileal Conduit: {YES / FL:23264}       Date of Last BM: ***    Intake/Output Summary (Last 24 hours) at 4/3/2020 1302  Last data filed at 4/3/2020 0400  Gross per 24 hour   Intake 120 ml   Output 650 ml   Net -530 ml     I/O last 3 completed shifts:   In: 120 [P.O.:120]  Out: 1050

## 2020-04-03 NOTE — PROGRESS NOTES
Physical Therapy    Facility/Department: [address]  Initial Assessment/Treatment    NAME: Anthony Calvin  : 1923  MRN: [id number]    Date of Service: 4/3/2020    Discharge Recommendations: Anthony Calvin scored a 15/24 on the AM-PAC short mobility form. Current research shows that an AM-PAC score of 17 or less is typically not associated with a discharge to the patient's home setting. Based on the patients AM-PAC score and their current functional mobility deficits, it is recommended that the patient have 3-5 sessions per week of Physical Therapy at d/c to increase the patients independence. Despite LOWER AMPAC- anticipate return to [facility] with 24 hr A. If patient discharges prior to next session this note will serve as a discharge summary. Please see below for the latest assessment towards goals. HOME HEALTH CARE: LEVEL 1 STANDARD    - Initial home health evaluation to occur within 24-48 hours, in patient home   - Therapy to evaluate with goal of regaining prior level of functioning   - Therapy to evaluate if patient has [address] needs for personal care      PT Equipment Recommendations  Equipment Needed: No    Assessment   Body structures, Functions, Activity limitations: Decreased functional mobility ; Decreased endurance;Decreased balance  Assessment: 81 yo admitted with SOB. Demo overall weakness/deconditioning but able to get OOB & walk short distances with 1-2 person A. Does have 24 hr caregivers at [facility] anticipate return there upon dc. Pt does seem below her baseline since normally can walk on her own with rollator but now needing A to stand & walk. Encouraged to have caregivers assist more at dc & use the gait belt with her. Will continue to follow.    Treatment Diagnosis: impaired mobility  Prognosis: Good  Decision Making: Medium Complexity  PT Education: PT Role;Plan of Care;Transfer Training;Functional Mobility Training;Gait Training;General forgetful. Pain Screening  Patient Currently in Pain: (c/o some toe cramping with initially standing up, but no specific c/o pain)  Vital Signs  Patient Currently in Pain: (c/o some toe cramping with initially standing up, but no specific c/o pain)       Orientation  Orientation  Overall Orientation Status: Impaired  Orientation Level: Disoriented to situation;Oriented to place;Oriented to person  Social/Functional History  Social/Functional History  Lives With: Alone(has 24 hr caregivers)  Type of Home: Apartment([facility])  Home Layout: One level  Home Access: Level entry  Bathroom Shower/Tub: Walk-in shower(small step to enter shower)  Bathroom Toilet: Standard  Bathroom Equipment: Shower chair, Grab bars in shower, Hand-held shower  Home Equipment: 4 wheeled walker(wc available in building if needed)  ADL Assistance: Needs assistance(they stand by with showering & can A as needed)  Homemaking Assistance: Needs assistance(all provided by caregivers)  Ambulation Assistance: Independent(with 9UT but caregivers are present)  Transfer Assistance: Independent  Active : No  Leisure & Hobbies: reads a lot  Additional Comments: Sedentary PTA- sits in chair most of day & reads.          Objective          AROM RLE (degrees)  RLE AROM: WFL  AROM LLE (degrees)  LLE AROM : WFL  Strength RLE  Comment: MMT not performed but demo good ROM & likely [facility] for [facility]  Strength LLE  Comment: MMT not performed but demo good ROM & likely [facility] for age        Bed mobility  Supine to Sit: Minimal assistance(HOB elev 40 deg with use of rail)  Scooting: Contact guard assistance(slightly effortful)  Transfers  Sit to Stand: Dependent/Total(MIN A of 2 from bed on 1st trial; Mod A of 1/min a of 1 from chair, mod A of 1 from toilet with use of grab bar)  Stand to sit: Minimal Assistance  Ambulation  Ambulation?: Yes  Ambulation 1  Surface: level tile  Device: Rolling Walker  Other Apparatus: O2(2L)  Assistance: Minimal assistance  Quality

## 2020-04-03 NOTE — PROGRESS NOTES
aspiration and concern if aspiration occurs. Pt stated understanding  Cont goal    Patient/Family/Caregiver Education:  As above    Compensatory Strategies:  90 degrees all meals   Eat slowly, swallow prior to taking next bite    Plan:  Continued daily Dysphagia treatment with goals per  plan of care. Diet recommendations:cont regular diet/thin liquids -if any s/s of aspiration emerge, or there is respiratory decline,  make NPO until further evaluated by SLP  DC recommendation: most likely will not require follow up  Treatment: 15  D/W nursing   Needs met prior to leaving room, call button in reach. Samia Galvan M.S./[facility]-SLP #[id number]  Pg.  # [phone]  If patient is discharged prior to next treatment, this note will serve as the discharge summary

## 2020-04-03 NOTE — CARE COORDINATION
Case Management Assessment           Daily Note                 Date/ Time of Note: 4/3/2020 1:41 PM         Note completed by: Shelli Alejandor    Patient Name: Christine Perez  YOB: 1923    Diagnosis:SOB (shortness of breath) [R06.02]  SOB (shortness of breath) [R06.02]  Patient Admission Status: Inpatient    Date of Admission:4/1/2020  1:34 PM Length of Stay: 2 GLOS:        Current Plan of Care: resp failure secondary to heart failure  ________________________________________________________________________________________  PT AM-PAC: 15 / 24 per last evaluation on: 4/3    OT AM-PAC: 17 / 24 per last evaluation on: 4/3    DME Needs for discharge:      ________________________________________________________________________________________  Discharge Plan: Other: [facility]    Tentative discharge date: possible 4/4    Current barriers to discharge: medical stability    Referrals completed: Not Applicable    Resources/ information provided: Not indicated at this time  ________________________________________________________________________________________  Case Management Notes: CM met with pt. [place] [facility]. Pt has 24hr care. Pt to have respiratory eval for potential home O2. [facility] notified of new O2 referral.      Spoke with Rajiv Kauffman, daughter, would like home care with [facility]. She would like to notify agency when mother is going home. Please call Rajiv Kauffman at [phone] with dc time so she can arrange care. CM will continue to follow. Lizzie and her family were provided with choice of provider; she and her family are in agreement with the discharge plan.     Care Transition Patient: Marcie Alejandro RN  [facility]  Case Management Department  Ph: [phone]

## 2020-04-04 VITALS
DIASTOLIC BLOOD PRESSURE: 61 MMHG | HEIGHT: 63 IN | RESPIRATION RATE: 16 BRPM | OXYGEN SATURATION: 90 % | WEIGHT: 110.67 LBS | SYSTOLIC BLOOD PRESSURE: 116 MMHG | TEMPERATURE: 97.7 F | BODY MASS INDEX: 19.61 KG/M2 | HEART RATE: 78 BPM

## 2020-04-04 LAB
A/G RATIO: 1.3 (ref 1.1–2.2)
ALBUMIN SERPL-MCNC: 3.4 G/DL (ref 3.4–5)
ALP BLD-CCNC: 55 U/L (ref 40–129)
ALT SERPL-CCNC: 11 U/L (ref 10–40)
ANION GAP SERPL CALCULATED.3IONS-SCNC: 9 MMOL/L (ref 3–16)
AST SERPL-CCNC: 13 U/L (ref 15–37)
BASOPHILS ABSOLUTE: 0 K/UL (ref 0–0.2)
BASOPHILS RELATIVE PERCENT: 0.7 %
BILIRUB SERPL-MCNC: <0.2 MG/DL (ref 0–1)
BUN BLDV-MCNC: 16 MG/DL (ref 7–20)
CALCIUM SERPL-MCNC: 8.7 MG/DL (ref 8.3–10.6)
CHLORIDE BLD-SCNC: 97 MMOL/L (ref 99–110)
CO2: 36 MMOL/L (ref 21–32)
CREAT SERPL-MCNC: 0.9 MG/DL (ref 0.6–1.2)
EOSINOPHILS ABSOLUTE: 0.1 K/UL (ref 0–0.6)
EOSINOPHILS RELATIVE PERCENT: 0.7 %
GFR AFRICAN AMERICAN: >60
GFR NON-AFRICAN AMERICAN: 58
GLOBULIN: 2.6 G/DL
GLUCOSE BLD-MCNC: 101 MG/DL (ref 70–99)
HCT VFR BLD CALC: 28 % (ref 36–48)
HEMOGLOBIN: 9 G/DL (ref 12–16)
LYMPHOCYTES ABSOLUTE: 0.5 K/UL (ref 1–5.1)
LYMPHOCYTES RELATIVE PERCENT: 7.9 %
MCH RBC QN AUTO: 27.4 PG (ref 26–34)
MCHC RBC AUTO-ENTMCNC: 32.3 G/DL (ref 31–36)
MCV RBC AUTO: 84.9 FL (ref 80–100)
MONOCYTES ABSOLUTE: 0.7 K/UL (ref 0–1.3)
MONOCYTES RELATIVE PERCENT: 10.1 %
NEUTROPHILS ABSOLUTE: 5.4 K/UL (ref 1.7–7.7)
NEUTROPHILS RELATIVE PERCENT: 80.6 %
PDW BLD-RTO: 15.4 % (ref 12.4–15.4)
PLATELET # BLD: 362 K/UL (ref 135–450)
PMV BLD AUTO: 7.1 FL (ref 5–10.5)
POTASSIUM REFLEX MAGNESIUM: 4.6 MMOL/L (ref 3.5–5.1)
RBC # BLD: 3.29 M/UL (ref 4–5.2)
SODIUM BLD-SCNC: 142 MMOL/L (ref 136–145)
TOTAL PROTEIN: 6 G/DL (ref 6.4–8.2)
WBC # BLD: 6.7 K/UL (ref 4–11)

## 2020-04-04 PROCEDURE — 6370000000 HC RX 637 (ALT 250 FOR IP): Performed by: STUDENT IN AN ORGANIZED HEALTH CARE EDUCATION/TRAINING PROGRAM

## 2020-04-04 PROCEDURE — 6360000002 HC RX W HCPCS: Performed by: HOSPITALIST

## 2020-04-04 PROCEDURE — 85025 COMPLETE CBC W/AUTO DIFF WBC: CPT

## 2020-04-04 PROCEDURE — 80053 COMPREHEN METABOLIC PANEL: CPT

## 2020-04-04 PROCEDURE — 36415 COLL VENOUS BLD VENIPUNCTURE: CPT

## 2020-04-04 PROCEDURE — 99238 HOSP IP/OBS DSCHRG MGMT 30/<: CPT | Performed by: HOSPITALIST

## 2020-04-04 PROCEDURE — 2580000003 HC RX 258: Performed by: STUDENT IN AN ORGANIZED HEALTH CARE EDUCATION/TRAINING PROGRAM

## 2020-04-04 RX ORDER — FUROSEMIDE 20 MG/1
20 TABLET ORAL 2 TIMES DAILY
Qty: 60 TABLET | Refills: 2 | Status: SHIPPED | OUTPATIENT
Start: 2020-04-04 | End: 2020-06-01

## 2020-04-04 RX ORDER — AMLODIPINE BESYLATE 10 MG/1
5 TABLET ORAL DAILY
Qty: 30 TABLET | Refills: 3 | Status: SHIPPED | OUTPATIENT
Start: 2020-04-04 | End: 2020-05-05

## 2020-04-04 RX ADMIN — CYANOCOBALAMIN TAB 1000 MCG 1000 MCG: 1000 TAB at 11:32

## 2020-04-04 RX ADMIN — MICONAZOLE NITRATE: 20 POWDER TOPICAL at 11:45

## 2020-04-04 RX ADMIN — METOPROLOL TARTRATE 12.5 MG: 25 TABLET, FILM COATED ORAL at 11:32

## 2020-04-04 RX ADMIN — Medication 10 ML: at 11:46

## 2020-04-04 RX ADMIN — FAMOTIDINE 20 MG: 20 TABLET, FILM COATED ORAL at 11:32

## 2020-04-04 RX ADMIN — ASPIRIN 81 MG: 81 TABLET, COATED ORAL at 11:31

## 2020-04-04 RX ADMIN — FUROSEMIDE 20 MG: 10 INJECTION, SOLUTION INTRAMUSCULAR; INTRAVENOUS at 11:32

## 2020-04-04 RX ADMIN — Medication 250 MG: at 11:31

## 2020-04-04 RX ADMIN — APIXABAN 2.5 MG: 2.5 TABLET, FILM COATED ORAL at 11:33

## 2020-04-04 RX ADMIN — ATORVASTATIN CALCIUM 10 MG: 10 TABLET, FILM COATED ORAL at 11:32

## 2020-04-04 ASSESSMENT — PAIN SCALES - GENERAL: PAINLEVEL_OUTOF10: 0

## 2020-04-04 NOTE — PROGRESS NOTES
[facility]    Respiratory Therapy     Home Oxygen Evaluation        Name: Barry Terry Record Number: [id number]  Age: 80 y.o. Gender:  female   : 1923  Today's date: 2020  Room: 6320/6320-01      Assessment        BP (!) 104/58   Pulse 85   Temp 97.3 °F (36.3 °C) (Oral)   Resp 16   Ht 5' 3\" (1.6 m)   Wt 110 lb 10.7 oz (50.2 kg)   SpO2 97%   BMI 19.60 kg/m²     Patient Active Problem List   Diagnosis    Essential hypertension    Macular degeneration of both eyes    CAD (coronary artery disease)    Hyperlipidemia    Depressive disorder    Osteoporosis    Cognitive impairment    Hypokalemia    Trigger finger of left hand    NSTEMI (non-ST elevated myocardial infarction) (HCC)    Nonrheumatic aortic valve insufficiency    Paroxysmal atrial fibrillation (HCC)    Anemia    Acute hypoxemic respiratory failure (HCC)    Nonrheumatic mitral valve regurgitation    Acute on chronic diastolic heart failure (HCC)    Pulmonary nodule, right    Bilateral pleural effusion    Chronic atrial fibrillation    Hypomagnesemia    Physical deconditioning       Social History:  Social History     Tobacco Use    Smoking status: Former Smoker    Smokeless tobacco: Never Used    Tobacco comment: Quit smoking 30 years ago. Substance Use Topics    Alcohol use: No     Alcohol/week: 0.0 standard drinks    Drug use: No       Patient Room Air saturation at rest 77%    Oxygen saturations of 88% or less on RA qualifies patient for Home Oxygen    Patient resting on 2 lmp  with an oxygen saturation of  93%     Patient ambulated on 2 lpm with an oxygen saturation of 90%      In your clinical assessment does the Patient Require Portable Oxygen Tanks?     Yes               Patient/caregiver was educated on Home Oxygen process:  Yes      Level of patient/caregiver understanding able to:   [] Verbalize understanding   [] Demonstrate understanding       [] Teach back        [] Needs reinforcement        []  No available caregiver               []  Other:     Response to education:  Excellent     Time Spent with Home O2 Set Up:  10 minutes     Rosey Fabry, RCP on 4/4/2020 at 11:52 AM                 .

## 2020-04-05 LAB
BLOOD CULTURE, ROUTINE: NORMAL
CULTURE, BLOOD 2: NORMAL

## 2020-04-06 ENCOUNTER — CARE COORDINATION (OUTPATIENT)
Dept: CASE MANAGEMENT | Age: 85
End: 2020-04-06

## 2020-04-06 NOTE — CARE COORDINATION
[facility] Transitions Initial Follow Up Call    Call within 2 business days of discharge: Yes    Patient: Billie Smith Patient : 1923   MRN: [id number]   Reason for Admission: ARF with Hypoxia, CHF, MI  Discharge Date: 20 RARS: Readmission Risk Score: 17      Last Discharge [facility]       Complaint Diagnosis Description Type Department Provider    20 Cough; Shortness of Breath Pneumonia due to organism . .. ED to Hosp-Admission (Discharged) (ADMITTED) [facility] Aquilino Johnson MD; Nancy Amador. .. Spoke with: [address]: [facility] [employer]     Non-face-to-face services provided:  Obtained and reviewed discharge summary and/or continuity of care documents  Communication with home health agencies or other community services the patient is currently [employer]  Education of patient/family/caregiver/guardian to support self-management-.   Assessment and support for treatment adherence and medication management-.      Care Transitions 24 Hour Call    Schedule Follow Up Appointment with PCP:  Declined  Do you have any ongoing symptoms?:  Yes  Patient-reported symptoms:  Other (Comment: Loose stool)  Do you have a copy of your discharge instructions?:  Yes  Do you have all of your prescriptions and are they filled?:  Yes  Have you been contacted by a [facility] Pharmacist?:  No  Have you scheduled your follow up appointment?:  No  Were you discharged with any Home Care or Post Acute Services:  Yes  Post Acute Services:  Home Health (Comment: [facility])  Patient DME:  [employer] Maker lift, Shower chair, Other  Other Patient DME:  grab bars, [facility], Emergency Pull Strings   Do you have support at home?:  Alone, Other Caregiver  Do you feel like you have everything you need to keep you well at home?:  Yes  Are you an active caregiver in your home?:  No  Care Transitions Interventions  No Identified Needs       Summary  CTN spoke with patients Caregiver Sarah Wilson

## 2020-04-09 ENCOUNTER — CARE COORDINATION (OUTPATIENT)
Dept: CASE MANAGEMENT | Age: 85
End: 2020-04-09

## 2020-04-09 NOTE — CARE COORDINATION
[facility] Transitions Follow Up Call    2020    Patient: Vinnie Modi  Patient : 1923   MRN: [id number]   Reason for Admission: ARF with Hypoxia, CHF, MI  Discharge Date: 20 RARS: Readmission Risk Score: 16         Spoke with: [id number] Transitions Subsequent and Final Call    Schedule Follow Up Appointment with PCP:  Declined  Subsequent and Final Calls  Do you have any ongoing symptoms?:  No  Have your medications changed?:  No  Do you have any questions related to your medications?:  No  Do you currently have any active services?:  Yes  Are you currently active with any services?:  Home Health  Do you have any needs or concerns that I can assist you with?:  No  Identified Barriers:  None  Care Transitions Interventions  No Identified Needs  Other Interventions:          Summary  CTN spoke with patient this afternoon for follow up CTN call. Patient states she is doing well, reports no complaints of anymore cough or SOB. No difficulty with urination, BM's or appetite. No LE Edema, nausea, vomiting, fevers, chills or chest pain. Patient states private duty HHA's in home currently, are with her . CTN encouraged patient to continue to monitor herself for any of the above s/s, reporting any that may present to MD immediately, wash hands thoroughly, stay in [facility] at all times. No new or changed medications, no other issues or concerns at this time. CTN provided education on s/s that require medical attention and when to seek medical attention. CTN advised Pt of use of urgent care or physicians 24 hr access line if assistance is needed after hours or weekend. Pt denies any needs or concerns and is agreeable with additional calls.     Follow Up  Future Appointments   Date Time Provider Lucille Herrera   2020  1:30 PM MD Fitz Kahn, RN

## 2020-04-13 ENCOUNTER — TELEPHONE (OUTPATIENT)
Dept: INTERNAL MEDICINE CLINIC | Age: 85
End: 2020-04-13

## 2020-04-13 RX ORDER — POTASSIUM CHLORIDE 20 MEQ/1
20 TABLET, EXTENDED RELEASE ORAL 2 TIMES DAILY WITH MEALS
Qty: 60 TABLET | Refills: 1 | Status: SHIPPED | OUTPATIENT
Start: 2020-04-13 | End: 2020-05-26

## 2020-04-13 NOTE — TELEPHONE ENCOUNTER
She had been admitted to the hospital with low oxygen. It was felt to be more from diastolic heart failure than pneumonia or infection. She improved with diuresis and that is why she is on the Lasix and potassium. She was placed on oxygen because of her low oxygen level. She may not need it but unfortunately due to the shelter in place pandemic it has not been possible to recheck her oxygen level. Even the visiting nurses are not going out like they used to. If she could get in here for a brief visit I would be able to check her oxygen level. In the meantime the safest thing to do is to leave her on it. I may even be able to adjust medications once I see her.

## 2020-04-14 ENCOUNTER — CARE COORDINATION (OUTPATIENT)
Dept: CASE MANAGEMENT | Age: 85
End: 2020-04-14

## 2020-04-14 ENCOUNTER — TELEPHONE (OUTPATIENT)
Dept: INTERNAL MEDICINE CLINIC | Age: 85
End: 2020-04-14

## 2020-04-14 NOTE — TELEPHONE ENCOUNTER
O2 sat without O2  For 1/2 hr.was 84%. Put her back on O2 and then went up to 96%. MD informed and said for patient to stay on O2 - nurse informed and she will tell daughter.

## 2020-04-14 NOTE — TELEPHONE ENCOUNTER
I faxed note to [facility] per MD stating Hold O2 and check sat after 30 minutes. Not sure if they saw this yet.   Ready entire message from [address] [employer]

## 2020-04-14 NOTE — CARE COORDINATION
[facility] Transitions Follow Up Call    2020    Patient: Nas Castillo  Patient : 1923   MRN: [id number]   Reason for Admission: ARF with Hypoxia, CHF  Discharge Date: 20 RARS: Readmission Risk Score: 16         Spoke with: [id number] Transitions Subsequent and Final Call    Schedule Follow Up Appointment with PCP:  Declined  Subsequent and Final Calls  Do you have any ongoing symptoms?:  No  Have your medications changed?:  No  Do you have any questions related to your medications?:  No  Do you currently have any active services?:  Yes  Are you currently active with any services?:  Home Health  Do you have any needs or concerns that I can assist you with?:  No  Identified Barriers:  None  Care Transitions Interventions  No Identified Needs  Other Interventions:          Summary  CTN spoke with patient this afternoon for follow up CTN call. Patient states she is doing well, resting as needed and being really careful. Patient denies any complaints of any nausea, vomiting, fevers, chills, SOB or Cough. No difficulty with urination, BM's or Appetite, states she has caregivers in home that help with meals. No new or changed medications, no other issues or concerns at this time. CTN encouraged patient to wash hands thoroughly, avoid crowds and stay indoors as much as possible. Monitor herself for any of the above s/s, reporting any that may present to caregivers/MD immediately. CTN provided education on s/s that require medical attention and when to seek medical attention. CTN advised Pt of use of urgent care or physicians 24 hr access line if assistance is needed after hours or weekend. Pt denies any needs or concerns and is agreeable with additional calls.     Follow Up  Future Appointments   Date Time Provider Lucille Herrera   2020  1:30 PM MD Fitz Barraza RN

## 2020-04-21 ENCOUNTER — CARE COORDINATION (OUTPATIENT)
Dept: CASE MANAGEMENT | Age: 85
End: 2020-04-21

## 2020-04-21 NOTE — CARE COORDINATION
[facility] Transitions Follow Up Call    2020    Patient: Angelo Rosenbaum  Patient : 1923   MRN: [id number]   Reason for Admission: ARF with Hypoxia   Discharge Date: 20 RARS: Readmission Risk Score: 16         Spoke with: [id number] Transitions Subsequent and Final Call    Schedule Follow Up Appointment with PCP:  Declined  Subsequent and Final Calls  Do you have any ongoing symptoms?:  No  Have your medications changed?:  No  Do you have any questions related to your medications?:  No  Do you currently have any active services?:  Yes  Are you currently active with any services?:  Home Health  Do you have any needs or concerns that I can assist you with?:  No  Identified Barriers:  None  Care Transitions Interventions  No Identified Needs  Other Interventions:          Summary  CTN spoke with patient this am for follow up CTN call. Patient states she is doing well, reports no complaints of any   nausea, vomiting, fevers, chills, SOB or Cough. No difficulty with urination, BM's or Appetite. Patient has caregivers in home during the day, and at night to assist with ADL's and IADL's. No new or changed medications, no other issues or concerns at this time. CTN encouraged patient to continue to monitor herself for any of the above, s/s, reporting any that may present to MD immediately. CTN provided education on s/s that require medical attention and when to seek medical attention. CTN advised Pt of use of urgent care or physicians 24 hr access line if assistance is needed after hours or weekend. Pt denies any needs or concerns and is agreeable with additional calls.     Follow Up  Future Appointments   Date Time Provider Lucille Herrera   2020  1:30 PM MD Fitz Ceja RN

## 2020-04-24 ENCOUNTER — CARE COORDINATION (OUTPATIENT)
Dept: CASE MANAGEMENT | Age: 85
End: 2020-04-24

## 2020-04-24 NOTE — CARE COORDINATION
[facility] Transitions Follow Up Call    2020    Patient: Lucrecia Atkinson  Patient : 1923   MRN: [id number]   Reason for Admission: ARF w/ Hypoxia  Discharge Date: 20 RARS: Readmission Risk Score: 16         Spoke with: [id number] Transitions Subsequent and Final Call    Schedule Follow Up Appointment with PCP:  Declined  Subsequent and Final Calls  Do you have any ongoing symptoms?:  No  Have your medications changed?:  No  Do you have any questions related to your medications?:  No  Do you currently have any active services?:  No  Are you currently active with any services?:  Home Health  Do you have any needs or concerns that I can assist you with?:  No  Identified Barriers:  None  Care Transitions Interventions  No Identified Needs  Other Interventions:          Summary  CTN spoke with patient this am for follow up CTN call. Patient states she is doing well, HHA's in home . No new or changed medications, no issues or concerns at this time. Patient states she is not having any difficulty with urination, BM's or appetite. No complaints of any nausea, vomiting, fevers, chills, SOB or Cough. CTN provided education on s/s that require medical attention and when to seek medical attention. CTN advised Pt of use of urgent care or physicians 24 hr access line if assistance is needed after hours or weekend. Pt denies any needs or concerns and is agreeable with additional calls.     Follow Up  Future Appointments   Date Time Provider Lucille Herrera   2020  1:30 PM MD Fitz Luis [facility]       Mike Plata RN

## 2020-04-27 RX ORDER — POTASSIUM CHLORIDE 1500 MG/1
TABLET, EXTENDED RELEASE ORAL
Qty: 60 TABLET | Refills: 0 | OUTPATIENT
Start: 2020-04-27

## 2020-04-27 RX ORDER — MAGNESIUM OXIDE 400 MG/1
TABLET ORAL
Qty: 30 TABLET | Refills: 0 | OUTPATIENT
Start: 2020-04-27

## 2020-04-27 RX ORDER — FUROSEMIDE 20 MG/1
TABLET ORAL
Qty: 60 TABLET | Refills: 0 | OUTPATIENT
Start: 2020-04-27

## 2020-04-28 ENCOUNTER — CARE COORDINATION (OUTPATIENT)
Dept: CASE MANAGEMENT | Age: 85
End: 2020-04-28

## 2020-04-30 ENCOUNTER — CARE COORDINATION (OUTPATIENT)
Dept: CASE MANAGEMENT | Age: 85
End: 2020-04-30

## 2020-05-04 ENCOUNTER — CARE COORDINATION (OUTPATIENT)
Dept: CASE MANAGEMENT | Age: 85
End: 2020-05-04

## 2020-05-05 RX ORDER — AMLODIPINE BESYLATE 10 MG/1
TABLET ORAL
Qty: 30 TABLET | Refills: 2 | Status: ON HOLD
Start: 2020-05-05 | End: 2021-03-26 | Stop reason: HOSPADM

## 2020-05-13 ENCOUNTER — VIRTUAL VISIT (OUTPATIENT)
Dept: INTERNAL MEDICINE CLINIC | Age: 85
End: 2020-05-13
Payer: MEDICARE

## 2020-05-13 VITALS
BODY MASS INDEX: 19.13 KG/M2 | TEMPERATURE: 96.9 F | OXYGEN SATURATION: 99 % | WEIGHT: 108 LBS | DIASTOLIC BLOOD PRESSURE: 46 MMHG | SYSTOLIC BLOOD PRESSURE: 104 MMHG

## 2020-05-13 PROBLEM — J96.01 ACUTE HYPOXEMIC RESPIRATORY FAILURE (HCC): Status: RESOLVED | Noted: 2019-03-19 | Resolved: 2020-05-13

## 2020-05-13 PROCEDURE — G8427 DOCREV CUR MEDS BY ELIG CLIN: HCPCS | Performed by: INTERNAL MEDICINE

## 2020-05-13 PROCEDURE — 4040F PNEUMOC VAC/ADMIN/RCVD: CPT | Performed by: INTERNAL MEDICINE

## 2020-05-13 PROCEDURE — 1123F ACP DISCUSS/DSCN MKR DOCD: CPT | Performed by: INTERNAL MEDICINE

## 2020-05-13 PROCEDURE — 99214 OFFICE O/P EST MOD 30 MIN: CPT | Performed by: INTERNAL MEDICINE

## 2020-05-13 PROCEDURE — 1090F PRES/ABSN URINE INCON ASSESS: CPT | Performed by: INTERNAL MEDICINE

## 2020-05-13 ASSESSMENT — ENCOUNTER SYMPTOMS
TROUBLE SWALLOWING: 0
SHORTNESS OF BREATH: 0
ABDOMINAL PAIN: 0

## 2020-05-13 NOTE — PROGRESS NOTES
SUBJECTIVE:  Patient ID: Ned Schneider is an 80 y.o. female. HPI: Patient here today for the f/u of chronic problems-- see Problem List and associated comments. New issues or complaints include (also see Assessment for more details):      TELEHEALTH EVALUATION -- Audio/Visual (During [id number]-[id number] public health emergency)    Pursuant to the emergency declaration under the [address] [place] [address] authority and the [employer] Act, this Virtual  Visit was conducted, with patient's consent, to reduce the patient's risk of exposure to COVID-19 and provide continuity of care for an established patient. Services were provided through a video synchronous discussion virtually to substitute for in-person clinic visit. Patient is on a visit today with the assistance of her aide. She was in the hospital approximately 5 weeks ago with diastolic heart failure and hypoxia. Since returning home she has resumed her baseline. She requires the assistance of her aide for all of her functions, including her ADLs. Because of this she is compliant with medication and treatment. The aide relates that the patient's daughter requests a urinalysis for some concern of recurring UTI. The patient has not had a change in mental status recently. She remains baseline confused. Appetite is okay. She does not have any fever or complain of any urinary symptoms. Review of Systems   Constitutional: Negative for fever. Appetite okay. Activity mostly consists of sitting in her chair. HENT: Negative for trouble swallowing. Respiratory: Negative for shortness of breath. Cardiovascular: Negative for chest pain. Gastrointestinal: Negative for abdominal pain. Genitourinary: Negative for dysuria. Psychiatric/Behavioral: Positive for decreased concentration. Negative for behavioral problems, dysphoric mood and hallucinations.

## 2020-05-18 ENCOUNTER — TELEPHONE (OUTPATIENT)
Dept: INTERNAL MEDICINE CLINIC | Age: 85
End: 2020-05-18

## 2020-05-19 RX ORDER — SIMVASTATIN 20 MG
TABLET ORAL
Qty: 30 TABLET | Refills: 3 | Status: SHIPPED | OUTPATIENT
Start: 2020-05-19 | End: 2020-09-16

## 2020-05-22 ENCOUNTER — TELEPHONE (OUTPATIENT)
Dept: INTERNAL MEDICINE CLINIC | Age: 85
End: 2020-05-22

## 2020-05-22 NOTE — TELEPHONE ENCOUNTER
Ok to start care again with [facility]  per Dr Meggan Roper. Ha Merlos will fax paperwork to be signed by MD>    Because of Covid 19 and the [facility] non coverage u/a with reflex did not get done. Ha Merlos will try and get all the paperwork done and hopefully a nurse can go to the facility tomorrow to obtain specimen. Facilities are being very strict on nursing coming in & out of facility.         JOEY RODRÍGUEZ to close

## 2020-05-22 NOTE — TELEPHONE ENCOUNTER
Brianda with [facility] calling to get new start of care orders as well of recent office visit notes faxed to [phone]. Joceline Ayoub would like to know if labs can wait until next week.

## 2020-05-26 RX ORDER — POTASSIUM CHLORIDE 20 MEQ/1
20 TABLET, EXTENDED RELEASE ORAL 2 TIMES DAILY WITH MEALS
Qty: 60 TABLET | Refills: 2 | Status: SHIPPED | OUTPATIENT
Start: 2020-05-26 | End: 2020-10-01

## 2020-05-28 ENCOUNTER — TELEPHONE (OUTPATIENT)
Dept: INTERNAL MEDICINE CLINIC | Age: 85
End: 2020-05-28

## 2020-05-28 NOTE — TELEPHONE ENCOUNTER
[facility] following up on a  referral for home care spoke with Jesusita Delaney wants a UA, and meds come prefilled from pharmacy therefore that's fine nothing needed in regards to meds

## 2020-06-01 RX ORDER — FUROSEMIDE 20 MG/1
TABLET ORAL
Qty: 60 TABLET | Refills: 0 | Status: SHIPPED | OUTPATIENT
Start: 2020-06-01 | End: 2020-08-28

## 2020-06-09 RX ORDER — DONEPEZIL HYDROCHLORIDE 10 MG/1
TABLET, FILM COATED ORAL
Qty: 60 TABLET | Refills: 3 | Status: SHIPPED | OUTPATIENT
Start: 2020-06-09 | End: 2020-10-08

## 2020-06-25 RX ORDER — APIXABAN 2.5 MG/1
TABLET, FILM COATED ORAL
Qty: 60 TABLET | Refills: 2 | Status: SHIPPED | OUTPATIENT
Start: 2020-06-25 | End: 2020-08-05 | Stop reason: SDUPTHER

## 2020-07-09 ENCOUNTER — TELEPHONE (OUTPATIENT)
Dept: INTERNAL MEDICINE CLINIC | Age: 85
End: 2020-07-09

## 2020-07-09 DIAGNOSIS — N30.00 ACUTE CYSTITIS WITHOUT HEMATURIA: ICD-10-CM

## 2020-07-09 LAB
BACTERIA: ABNORMAL /HPF
BILIRUBIN URINE: NEGATIVE
BLOOD, URINE: ABNORMAL
CLARITY: ABNORMAL
COLOR: YELLOW
EPITHELIAL CELLS, UA: 5 /HPF (ref 0–5)
GLUCOSE URINE: NEGATIVE MG/DL
HYALINE CASTS: 2 /LPF (ref 0–8)
KETONES, URINE: NEGATIVE MG/DL
LEUKOCYTE ESTERASE, URINE: ABNORMAL
MICROSCOPIC EXAMINATION: YES
NITRITE, URINE: POSITIVE
PH UA: 7.5 (ref 5–8)
PROTEIN UA: ABNORMAL MG/DL
RBC UA: ABNORMAL /HPF (ref 0–4)
SPECIFIC GRAVITY UA: 1.02 (ref 1–1.03)
URINE TYPE: ABNORMAL
UROBILINOGEN, URINE: 0.2 E.U./DL
WBC UA: 191 /HPF (ref 0–5)

## 2020-07-09 NOTE — TELEPHONE ENCOUNTER
Ok urinalysis and urine culture, see orders , but assist to get things done. Not sure they can get to the lab?

## 2020-07-09 NOTE — TELEPHONE ENCOUNTER
Pt care giver is requesting a order for a urine sample. Care giver states pt has been going to the bathroom every 15 minutes and pt only goes a couple of drops. Care giver states pt is confused.        Please be advise

## 2020-07-10 RX ORDER — CIPROFLOXACIN 500 MG/1
500 TABLET, FILM COATED ORAL 2 TIMES DAILY
Qty: 14 TABLET | Refills: 0 | Status: SHIPPED | OUTPATIENT
Start: 2020-07-10 | End: 2020-07-17

## 2020-07-13 LAB
ORGANISM: ABNORMAL
URINE CULTURE, ROUTINE: ABNORMAL

## 2020-07-13 RX ORDER — NITROFURANTOIN MACROCRYSTALS 100 MG/1
100 CAPSULE ORAL DAILY
Qty: 30 CAPSULE | Refills: 5 | Status: SHIPPED | OUTPATIENT
Start: 2020-07-13 | End: 2020-08-12

## 2020-07-20 ENCOUNTER — TELEPHONE (OUTPATIENT)
Dept: INTERNAL MEDICINE CLINIC | Age: 85
End: 2020-07-20

## 2020-07-20 RX ORDER — CIPROFLOXACIN 500 MG/1
500 TABLET, FILM COATED ORAL 2 TIMES DAILY
Qty: 14 TABLET | Refills: 0 | Status: SHIPPED | OUTPATIENT
Start: 2020-07-20 | End: 2020-07-27

## 2020-07-20 NOTE — TELEPHONE ENCOUNTER
Today would have been first day of the macrodantin to use for preventive.  So should she hold off on that and do cipro first?
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
## 2020-07-22 NOTE — TELEPHONE ENCOUNTER
The safest recommendation is to refer her to the ER for evaluation for an infection that is not responding to the antibiotics or any other physical problem going on. The only other option is to give her heavy sedation to try to calm her down. But this would be ill advised without first having her evaluated.
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
## 2020-07-23 ENCOUNTER — TELEPHONE (OUTPATIENT)
Dept: INTERNAL MEDICINE CLINIC | Age: 85
End: 2020-07-23

## 2020-07-23 NOTE — TELEPHONE ENCOUNTER
Home care would like to know if home care can see pt in the independent living. Home care states pt family agrees. Home care states they can keep a eye on her and keep her out of the hospital. Home care states if Dr Ann Young agrees the home care needs the following items :    Face Sheet  Insurance info  List of meds  Last progress note and if pt has not been seen in 90 days then a vv will be schedule and someone from home care will help with the vv visit.

## 2020-07-24 NOTE — TELEPHONE ENCOUNTER
Home care requesting a order for PT, OT and skilled nursing evaluation for falls and UTI and would on bottom.      Please fax it to [phone]

## 2020-07-31 ENCOUNTER — VIRTUAL VISIT (OUTPATIENT)
Dept: INTERNAL MEDICINE CLINIC | Age: 85
End: 2020-07-31
Payer: MEDICARE

## 2020-07-31 PROCEDURE — 4040F PNEUMOC VAC/ADMIN/RCVD: CPT | Performed by: INTERNAL MEDICINE

## 2020-07-31 PROCEDURE — 1090F PRES/ABSN URINE INCON ASSESS: CPT | Performed by: INTERNAL MEDICINE

## 2020-07-31 PROCEDURE — 99214 OFFICE O/P EST MOD 30 MIN: CPT | Performed by: INTERNAL MEDICINE

## 2020-07-31 PROCEDURE — G8420 CALC BMI NORM PARAMETERS: HCPCS | Performed by: INTERNAL MEDICINE

## 2020-07-31 PROCEDURE — G8427 DOCREV CUR MEDS BY ELIG CLIN: HCPCS | Performed by: INTERNAL MEDICINE

## 2020-07-31 PROCEDURE — 1036F TOBACCO NON-USER: CPT | Performed by: INTERNAL MEDICINE

## 2020-07-31 PROCEDURE — 1123F ACP DISCUSS/DSCN MKR DOCD: CPT | Performed by: INTERNAL MEDICINE

## 2020-07-31 NOTE — ASSESSMENT & PLAN NOTE
Obvious cognitive and memory impairment. Significant behavioral issues regards to her assistance and aids. Noncompliance with medication. Poor insight. Given her current living situation it is not can be easy to remedy her medication noncompliance issues. I have asked Jason Garciasari who assisted in today's visit to have her daughter call me. This will be a difficult situation to handle, but the best answer may be moving the patient to a [place] facility near the daughter. In the past the patient has adamantly refused to do so, but is her cognitive skills even worsen and may be more imperative. This will have to be a decision made by the daughter in a relationship with her mother.

## 2020-07-31 NOTE — PROGRESS NOTES
SUBJECTIVE:  Patient ID: Tonja Boas is an 80 y.o. female. HPI: Patient here today for the f/u of chronic problems-- see Problem List and associated comments. New issues or complaints include (also see Assessment for more details):      TELEHEALTH EVALUATION -- Audio/Visual (During [id number] public health emergency)    Pursuant to the emergency declaration under the [address] [place] [address] [facility] and the [employer] Act, this Virtual  Visit was conducted, with patient's consent, to reduce the patient's risk of exposure to COVID-19 and provide continuity of care for an established patient. Services were provided through a video synchronous discussion virtually to substitute for in-person clinic visit. Patient was contacted today with the assistance of 1 of her aids named Gerardo Camarillo. The patient has obvious dementia and cognitive issues and the information was obtained from and conveyed back to her aid. It seems that the patient has been noncompliant with her medication and they are finding a lot of her pills on the floor after she has supposedly been taking them. This happens with her morning and evening medications. It is impossible to tell which medication she has or has not taken on any consistent basis but it appears that she has not taken medications on any consistent basis at all. The problem has been exacerbated by the patient's behavioral problems in association with those around her. She has had some behavioral issues associated with rage and yelling and noncompliance with her aids and those who are trying to help her. Review of Systems   Unable to perform ROS: Dementia       OBJECTIVE:    There were no vitals taken for this visit. Physical Exam  Psychiatric:         Attention and Perception: She does not perceive auditory hallucinations. Behavior: Behavior is not agitated.

## 2020-08-03 ENCOUNTER — TELEPHONE (OUTPATIENT)
Dept: INTERNAL MEDICINE CLINIC | Age: 85
End: 2020-08-03

## 2020-08-03 NOTE — TELEPHONE ENCOUNTER
Patient daughter Arron Olivarez is requesting to speak with Dr. Karlos Hall regarding the care of her mother. Please call.

## 2020-08-04 NOTE — TELEPHONE ENCOUNTER
Discussed situation with patient's daughter and son-in-law in [place]. Obviously due to the patient's continued deterioration mentally she is no longer a candidate to move to [place]. They are considering having her evaluated for more of an assisted living or memory care unit where she lives. There is been a significant amount of noncompliance, especially medication. There is also some behavioral issues. At this point I recommend that they concentrate on the least getting her amlodipine and metoprolol medications to keep the matters as simple as possible and forego other drugs. The daughter will let me know if any moves are imminent.

## 2020-08-04 NOTE — TELEPHONE ENCOUNTER
Pt daughter is requesting to speak to Dr. Karlos Hall. Pt daughter states she will be home tomorrow until 10:30am and the afternoon she will be in and out.  Pt daughter states she will be home by 4:30n tomorrow

## 2020-08-05 NOTE — TELEPHONE ENCOUNTER
Discussed yesterday with patient's daughter. At this point I recommend continuing the amlodipine and metoprolol, and I do recommend continuing the Eliquis for stroke prophylaxis. I would recommend continuing the Eliquis as long as she does not pose is a fall risk. Okay to stop all other medications.

## 2020-08-28 RX ORDER — FUROSEMIDE 20 MG/1
TABLET ORAL
Qty: 60 TABLET | Refills: 0 | Status: SHIPPED | OUTPATIENT
Start: 2020-08-28 | End: 2020-10-28

## 2020-08-31 ENCOUNTER — TELEPHONE (OUTPATIENT)
Dept: INTERNAL MEDICINE CLINIC | Age: 85
End: 2020-08-31

## 2020-08-31 NOTE — TELEPHONE ENCOUNTER
Alyssa Dickerson with [id number] called to advise the patient is being discharged from [address] [place] [address] per patient request. Patient has met all goes and is doing better.

## 2020-09-10 ENCOUNTER — TELEPHONE (OUTPATIENT)
Dept: INTERNAL MEDICINE CLINIC | Age: 85
End: 2020-09-10

## 2020-09-10 NOTE — TELEPHONE ENCOUNTER
Home care is looking for a return order for a plan of care for 8/3-10/120 either fax back or call if not recieved

## 2020-09-16 RX ORDER — SIMVASTATIN 20 MG
TABLET ORAL
Qty: 30 TABLET | Refills: 2 | Status: SHIPPED | OUTPATIENT
Start: 2020-09-16 | End: 2020-12-16

## 2020-09-21 ENCOUNTER — HOSPITAL ENCOUNTER (EMERGENCY)
Age: 85
Discharge: HOME | End: 2020-09-21
Attending: EMERGENCY MEDICINE
Payer: MEDICARE

## 2020-09-21 ENCOUNTER — APPOINTMENT (OUTPATIENT)
Dept: GENERAL RADIOLOGY | Age: 85
End: 2020-09-21
Payer: MEDICARE

## 2020-09-21 ENCOUNTER — TELEPHONE (OUTPATIENT)
Dept: INTERNAL MEDICINE CLINIC | Age: 85
End: 2020-09-21

## 2020-09-21 VITALS
OXYGEN SATURATION: 100 % | BODY MASS INDEX: 17.36 KG/M2 | HEART RATE: 57 BPM | SYSTOLIC BLOOD PRESSURE: 121 MMHG | HEIGHT: 66 IN | RESPIRATION RATE: 20 BRPM | DIASTOLIC BLOOD PRESSURE: 55 MMHG | TEMPERATURE: 97.8 F | WEIGHT: 108 LBS

## 2020-09-21 LAB
ANION GAP SERPL CALCULATED.3IONS-SCNC: 10 MMOL/L (ref 3–16)
BASE EXCESS VENOUS: 8 MMOL/L (ref -2–3)
BASOPHILS ABSOLUTE: 0.1 K/UL (ref 0–0.2)
BASOPHILS RELATIVE PERCENT: 0.7 %
BUN BLDV-MCNC: 19 MG/DL (ref 7–20)
CALCIUM SERPL-MCNC: 9.5 MG/DL (ref 8.3–10.6)
CARBOXYHEMOGLOBIN: 1.3 % (ref 0–1.5)
CHLORIDE BLD-SCNC: 97 MMOL/L (ref 99–110)
CO2: 35 MMOL/L (ref 21–32)
CREAT SERPL-MCNC: 0.9 MG/DL (ref 0.6–1.2)
EKG ATRIAL RATE: 55 BPM
EKG DIAGNOSIS: NORMAL
EKG P AXIS: 50 DEGREES
EKG P-R INTERVAL: 168 MS
EKG Q-T INTERVAL: 444 MS
EKG QRS DURATION: 66 MS
EKG QTC CALCULATION (BAZETT): 424 MS
EKG R AXIS: 11 DEGREES
EKG T AXIS: 60 DEGREES
EKG VENTRICULAR RATE: 55 BPM
EOSINOPHILS ABSOLUTE: 0.1 K/UL (ref 0–0.6)
EOSINOPHILS RELATIVE PERCENT: 1.2 %
GFR AFRICAN AMERICAN: >60
GFR NON-AFRICAN AMERICAN: 58
GLUCOSE BLD-MCNC: 94 MG/DL (ref 70–99)
HCO3 VENOUS: 35.3 MMOL/L (ref 24–28)
HCT VFR BLD CALC: 33.9 % (ref 36–48)
HEMOGLOBIN, VEN, REDUCED: 64.1 %
HEMOGLOBIN: 11.1 G/DL (ref 12–16)
LYMPHOCYTES ABSOLUTE: 0.8 K/UL (ref 1–5.1)
LYMPHOCYTES RELATIVE PERCENT: 10.5 %
MCH RBC QN AUTO: 29.3 PG (ref 26–34)
MCHC RBC AUTO-ENTMCNC: 32.6 G/DL (ref 31–36)
MCV RBC AUTO: 89.6 FL (ref 80–100)
METHEMOGLOBIN VENOUS: 0.6 % (ref 0–1.5)
MONOCYTES ABSOLUTE: 0.6 K/UL (ref 0–1.3)
MONOCYTES RELATIVE PERCENT: 8.2 %
NEUTROPHILS ABSOLUTE: 6.1 K/UL (ref 1.7–7.7)
NEUTROPHILS RELATIVE PERCENT: 79.4 %
O2 SAT, VEN: 35 %
PCO2, VEN: 69.9 MMHG (ref 41–51)
PDW BLD-RTO: 15.3 % (ref 12.4–15.4)
PH VENOUS: 7.32 (ref 7.35–7.45)
PLATELET # BLD: 345 K/UL (ref 135–450)
PMV BLD AUTO: 7.9 FL (ref 5–10.5)
PO2, VEN: 26.5 MMHG (ref 25–40)
POC OCCULT BLOOD STOOL: POSITIVE
POTASSIUM REFLEX MAGNESIUM: 4 MMOL/L (ref 3.5–5.1)
RBC # BLD: 3.78 M/UL (ref 4–5.2)
SODIUM BLD-SCNC: 142 MMOL/L (ref 136–145)
TCO2 CALC VENOUS: 37 MMOL/L
WBC # BLD: 7.7 K/UL (ref 4–11)

## 2020-09-21 PROCEDURE — 99285 EMERGENCY DEPT VISIT HI MDM: CPT

## 2020-09-21 PROCEDURE — 82803 BLOOD GASES ANY COMBINATION: CPT

## 2020-09-21 PROCEDURE — 85025 COMPLETE CBC W/AUTO DIFF WBC: CPT

## 2020-09-21 PROCEDURE — 80048 BASIC METABOLIC PNL TOTAL CA: CPT

## 2020-09-21 PROCEDURE — 71046 X-RAY EXAM CHEST 2 VIEWS: CPT

## 2020-09-21 PROCEDURE — 82272 OCCULT BLD FECES 1-3 TESTS: CPT

## 2020-09-21 PROCEDURE — 93005 ELECTROCARDIOGRAM TRACING: CPT | Performed by: EMERGENCY MEDICINE

## 2020-09-21 RX ORDER — IBUPROFEN 200 MG
1 CAPSULE ORAL DAILY
COMMUNITY

## 2020-09-21 RX ORDER — NITROFURANTOIN MACROCRYSTALS 100 MG/1
100 CAPSULE ORAL NIGHTLY
COMMUNITY

## 2020-09-21 RX ORDER — FERROUS SULFATE 325(65) MG
325 TABLET ORAL
COMMUNITY

## 2020-09-21 ASSESSMENT — ENCOUNTER SYMPTOMS
NAUSEA: 0
DIARRHEA: 0
ABDOMINAL PAIN: 0
COUGH: 0
SHORTNESS OF BREATH: 0
VOMITING: 0

## 2020-09-21 NOTE — TELEPHONE ENCOUNTER
The patient's daughter is reporting that the nursing staff is reporting the patient has a productive green phelgm and possible start of pneumonia. The patient daughter is requesting a return call.

## 2020-09-21 NOTE — ED NOTES
Patient prepared for and ready to be discharged. Dressed in clothes and given belongings. IV removed, pt tolerated well, no complications. Patient discharged at this time in no acute distress after she   verbalized understanding of discharge instructions. Reviewed medications, and when to return to the ED with patient. Encouraged follow up with PCP  Patient walked to lobby/caregiver will wait with her until friend arrives to take patient home.        Allanesha Smith-Narcisse, RN  09/21/20 1138

## 2020-09-21 NOTE — ED TRIAGE NOTES
Pt is from [facility]. Per report from EMS, pt was having shortness of breath at the facility. Pt has a history of dementia. Pt is alert and oriented x 4, pleasant and witty. She is able to answer questions appropriately. She does not understand why she was sent here. She stated she feels fine. Denies symptoms. V/S stable.

## 2020-09-21 NOTE — TELEPHONE ENCOUNTER
Spoke to daughter stated that the nurse called her and she will call the nurse back and have her take her to the ER to be evaluated.

## 2020-09-21 NOTE — ED PROVIDER NOTES
Effort: Pulmonary effort is normal.      Breath sounds: Normal breath sounds. Abdominal:      General: Bowel sounds are normal. There is no distension. Palpations: Abdomen is soft. Tenderness: There is no abdominal tenderness. Musculoskeletal: Normal range of motion. Skin:     General: Skin is warm and dry. Findings: No rash. Neurological:      Mental Status: She is alert and oriented to person, place, and time. Psychiatric:         Behavior: Behavior normal.         Diagnostic Results     EKG   Interpreted by Crista Groves MD     Rhythm: normal sinus   Rate: normal  Axis: normal  Ectopy: none  Conduction: normal  ST Segments: no acute change  T Waves: no acute change  Q Waves: none    Clinical Impression: normal sinus rhythm with no acute changes/normal EKG      RADIOLOGY:  XR CHEST (2 VW)   Final Result   1.  No active airspace disease or effusion          LABS:   Results for orders placed or performed during the hospital encounter of 09/21/20   CBC Auto Differential   Result Value Ref Range    WBC 7.7 4.0 - 11.0 K/uL    RBC 3.78 (L) 4.00 - 5.20 M/uL    Hemoglobin 11.1 (L) 12.0 - 16.0 g/dL    Hematocrit 33.9 (L) 36.0 - 48.0 %    MCV 89.6 80.0 - 100.0 fL    MCH 29.3 26.0 - 34.0 pg    MCHC 32.6 31.0 - 36.0 g/dL    RDW 15.3 12.4 - 15.4 %    Platelets 584 056 - 552 K/uL    MPV 7.9 5.0 - 10.5 fL    Neutrophils % 79.4 %    Lymphocytes % 10.5 %    Monocytes % 8.2 %    Eosinophils % 1.2 %    Basophils % 0.7 %    Neutrophils Absolute 6.1 1.7 - 7.7 K/uL    Lymphocytes Absolute 0.8 (L) 1.0 - 5.1 K/uL    Monocytes Absolute 0.6 0.0 - 1.3 K/uL    Eosinophils Absolute 0.1 0.0 - 0.6 K/uL    Basophils Absolute 0.1 0.0 - 0.2 K/uL   Blood Gas, Venous   Result Value Ref Range    pH, Emmanuel 7.323 (L) 7.350 - 7.450    pCO2, Emmanuel 69.9 (H) 41.0 - 51.0 mmHg    pO2, Emmanuel 26.5 25.0 - 40.0 mmHg    HCO3, Venous 35.3 (H) 24.0 - 28.0 mmol/L    Base Excess, Emmanuel 8.0 (H) -2.0 - 3.0 mmol/L    O2 Sat, Emmanuel 35 Not

## 2020-09-21 NOTE — TELEPHONE ENCOUNTER
If she is having any respiratory distress that she needs to go to the ER. If she is having a change in mental status or high fever she needs to go to the ER as well. Otherwise can they get a chest x-ray at the facility where she lives, and start azithromycin 500 mg on day 1 and 250 mg on day 2 through 5.

## 2020-10-01 RX ORDER — POTASSIUM CHLORIDE 20 MEQ/1
20 TABLET, EXTENDED RELEASE ORAL 2 TIMES DAILY WITH MEALS
Qty: 60 TABLET | Refills: 2 | Status: SHIPPED | OUTPATIENT
Start: 2020-10-01 | End: 2021-02-09

## 2020-10-05 ENCOUNTER — TELEPHONE (OUTPATIENT)
Dept: INTERNAL MEDICINE CLINIC | Age: 85
End: 2020-10-05

## 2020-10-05 ENCOUNTER — OFFICE VISIT (OUTPATIENT)
Dept: INTERNAL MEDICINE CLINIC | Age: 85
End: 2020-10-05
Payer: MEDICARE

## 2020-10-05 VITALS
OXYGEN SATURATION: 95 % | SYSTOLIC BLOOD PRESSURE: 113 MMHG | HEART RATE: 55 BPM | TEMPERATURE: 97.8 F | HEIGHT: 66 IN | DIASTOLIC BLOOD PRESSURE: 56 MMHG | BODY MASS INDEX: 17.43 KG/M2

## 2020-10-05 PROCEDURE — G8484 FLU IMMUNIZE NO ADMIN: HCPCS | Performed by: INTERNAL MEDICINE

## 2020-10-05 PROCEDURE — 4040F PNEUMOC VAC/ADMIN/RCVD: CPT | Performed by: INTERNAL MEDICINE

## 2020-10-05 PROCEDURE — 1123F ACP DISCUSS/DSCN MKR DOCD: CPT | Performed by: INTERNAL MEDICINE

## 2020-10-05 PROCEDURE — 1036F TOBACCO NON-USER: CPT | Performed by: INTERNAL MEDICINE

## 2020-10-05 PROCEDURE — G8427 DOCREV CUR MEDS BY ELIG CLIN: HCPCS | Performed by: INTERNAL MEDICINE

## 2020-10-05 PROCEDURE — 90732 PPSV23 VACC 2 YRS+ SUBQ/IM: CPT | Performed by: INTERNAL MEDICINE

## 2020-10-05 PROCEDURE — G8419 CALC BMI OUT NRM PARAM NOF/U: HCPCS | Performed by: INTERNAL MEDICINE

## 2020-10-05 PROCEDURE — 99214 OFFICE O/P EST MOD 30 MIN: CPT | Performed by: INTERNAL MEDICINE

## 2020-10-05 PROCEDURE — G0008 ADMIN INFLUENZA VIRUS VAC: HCPCS | Performed by: INTERNAL MEDICINE

## 2020-10-05 PROCEDURE — G0009 ADMIN PNEUMOCOCCAL VACCINE: HCPCS | Performed by: INTERNAL MEDICINE

## 2020-10-05 PROCEDURE — 90694 VACC AIIV4 NO PRSRV 0.5ML IM: CPT | Performed by: INTERNAL MEDICINE

## 2020-10-05 PROCEDURE — 1090F PRES/ABSN URINE INCON ASSESS: CPT | Performed by: INTERNAL MEDICINE

## 2020-10-05 NOTE — TELEPHONE ENCOUNTER
Pt daughter that lives out of town stated pt has a appt today and there is something that she would like to discuss before her appt and requesting a call back to discuss pls advise- things she would like to discuss:      Over her meds to see if any changes     pt needs a flu shot    Check for a uti because [place] was [place] of [place] this weekend     Not sure if she had a pneumonia shot or not and if she needs it give it to her. ...

## 2020-10-05 NOTE — ASSESSMENT & PLAN NOTE
Dementia evident. Certainly more pronounced than the couple years ago. Relatively stable compared to several months ago. We will try to check urinalysis.

## 2020-10-05 NOTE — PROGRESS NOTES
Vaccine Information Sheet, \"Influenza - Inactivated\"  given to Delgrace How, or parent/legal guardian of  Stacy How and verbalized understanding. Patient responses:    Have you ever had a reaction to a flu vaccine? No  Do you have any current illness? No  Have you ever had [place] Syndrome? No  Do you have a serious allergy to any of the follow: Neomycin, Polymyxin, Thimerosal, eggs or egg products? No    Flu vaccine given per order. Please see immunization tab. Risks and benefits explained. Current VIS given.

## 2020-10-05 NOTE — PROGRESS NOTES
SUBJECTIVE:  Patient ID: Kana Weiss is an 80 y.o. female. HPI: Patient here today for the f/u of chronic problems-- see Problem List and associated comments. New issues or complaints include (also see Assessment for more details): Patient in the emergency room approximately 2 weeks ago. Hospital records reviewed. She was sent to the emergency room for possible blood in her stool. The patient has no recollection of why she was sent to the emergency room, in fact when she was in the emergency room she was unsure why she was even there. She stated she felt fine. There is no active bleeding and her physical exam was unremarkable. In fact her blood count was better than usual for her. She has intermittent periods of time when she has more confusion than other times. Her aide who is with her today states that she can usually be reoriented after period of time just by talking. However she quickly slips back into memory difficulties. At this point today the patient denies any physical problems. She states she feels well. She denies any abdominal discomfort. Her aide states that she has a good appetite and eats well. Review of Systems   Unable to perform ROS: Dementia       OBJECTIVE:    BP (!) 113/56   Pulse 55   Temp 97.8 °F (36.6 °C)   Ht 5' 6\" (1.676 m)   SpO2 95% Comment: on 1.5 Liters of oxygen  BMI 17.43 kg/m²      Physical Exam  Constitutional:       Appearance: She is well-developed and normal weight. She is not ill-appearing or diaphoretic. Interventions: Nasal cannula in place. Comments: Patient in wheelchair   Eyes:      General: No scleral icterus. Right eye: No discharge. Left eye: No discharge. Extraocular Movements: Extraocular movements intact. Neck:      Musculoskeletal: Neck supple. Cardiovascular:      Rate and Rhythm: Normal rate. Heart sounds: Murmur present.    Pulmonary:      Effort: Pulmonary effort is normal. No respiratory months ago. We will try to check urinalysis. Anemia  Last hemoglobin 11.1 in the ER. This is much better than usual.    Essential hypertension  BP okay    Chronic atrial fibrillation  She remains on low-dose Eliquis. Given her current situation she is not a fall risk. Unspecified dementia with behavioral disturbance (HCC)  Unspecified dementia -- cause of cognitive impairment. Continuous assist.  Needs eval.        PLAN:See ASSESSMENT for evaluation & PLAN     Orders Placed This Encounter   Procedures    INFLUENZA, QUADV, ADJUVANTED, 72 YRS =, IM, PF, PREFILL SYR, 0.5ML (FLUAD)    Pneumococcal polysaccharide vaccine 23-valent greater than or equal to 3yo subcutaneous/IM    Urinalysis Reflex to Culture     Standing Status:   Future     Number of Occurrences:   1     Standing Expiration Date:   10/5/2021     Order Specific Question:   SPECIFY(EX-CATH,MIDSTREAM,CYSTO,ETC)? Answer:   NA     , PMH, SH and FH reviewed and noted. Recent and past labs, tests and consultsalso reviewed. Recent or new meds also reviewed.

## 2020-10-05 NOTE — ASSESSMENT & PLAN NOTE
Patient was in the emergency room on 9/21/2020. Records reviewed. Work-up at that time fairly unremarkable. Patient was sent back to the assisted living. She is accompanied today by her aide.

## 2020-10-07 RX ORDER — CIPROFLOXACIN 250 MG/1
250 TABLET, FILM COATED ORAL 2 TIMES DAILY
Qty: 14 TABLET | Refills: 0 | Status: SHIPPED | OUTPATIENT
Start: 2020-10-07 | End: 2020-10-14

## 2020-10-08 RX ORDER — DONEPEZIL HYDROCHLORIDE 10 MG/1
TABLET, FILM COATED ORAL
Qty: 60 TABLET | Refills: 1 | Status: SHIPPED | OUTPATIENT
Start: 2020-10-08 | End: 2021-02-02

## 2020-10-15 ENCOUNTER — TELEPHONE (OUTPATIENT)
Dept: INTERNAL MEDICINE CLINIC | Age: 85
End: 2020-10-15

## 2020-10-15 NOTE — TELEPHONE ENCOUNTER
Patients daughter Justin Sow states the nurse from [facility] last seen the patient a month ago because the time frame ended. She is just wondering if Dr. Lemuel Turner thinks it is necessary for the patient to have a nurse still care for her on a weekly basis. If he does think its necessary please send another order for [facility]. Please Advise.

## 2020-10-16 NOTE — TELEPHONE ENCOUNTER
I think would be a good idea to have a nurse to evaluate her on a routine basis. She is very susceptible to infections and deterioration in her age.

## 2020-10-19 ENCOUNTER — TELEPHONE (OUTPATIENT)
Dept: INTERNAL MEDICINE CLINIC | Age: 85
End: 2020-10-19

## 2020-10-28 RX ORDER — FUROSEMIDE 20 MG/1
TABLET ORAL
Qty: 60 TABLET | Refills: 0 | Status: SHIPPED
Start: 2020-10-28 | End: 2021-03-24 | Stop reason: CLARIF

## 2020-11-01 PROBLEM — F03.91 UNSPECIFIED DEMENTIA WITH BEHAVIORAL DISTURBANCE: Status: ACTIVE | Noted: 2020-01-01

## 2020-11-04 PROBLEM — Z09 HOSPITAL DISCHARGE FOLLOW-UP: Status: RESOLVED | Noted: 2018-07-27 | Resolved: 2020-11-04

## 2020-11-09 NOTE — CARE COORDINATION
ACM Note:  The pt's caregiver stated the pt has 24 hour care and home health. Pt declined [facility]. Stomach biopsy showed erosion with mild acute and chronic inflammation.   Esophageal biopsy was normal.

## 2020-11-19 ENCOUNTER — TELEPHONE (OUTPATIENT)
Dept: INTERNAL MEDICINE CLINIC | Age: 85
End: 2020-11-19

## 2020-11-19 ENCOUNTER — NURSE ONLY (OUTPATIENT)
Dept: INTERNAL MEDICINE CLINIC | Age: 85
End: 2020-11-19
Payer: MEDICARE

## 2020-11-19 LAB
BILIRUBIN, POC: ABNORMAL
BLOOD URINE, POC: ABNORMAL
CLARITY, POC: ABNORMAL
COLOR, POC: YELLOW
GLUCOSE URINE, POC: ABNORMAL
KETONES, POC: ABNORMAL
LEUKOCYTE EST, POC: ABNORMAL
NITRITE, POC: POSITIVE
PH, POC: 6.5
PROTEIN, POC: ABNORMAL
SPECIFIC GRAVITY, POC: 1.02
UROBILINOGEN, POC: 2

## 2020-11-19 PROCEDURE — 81002 URINALYSIS NONAUTO W/O SCOPE: CPT | Performed by: INTERNAL MEDICINE

## 2020-11-19 RX ORDER — CIPROFLOXACIN 500 MG/1
500 TABLET, FILM COATED ORAL 2 TIMES DAILY
Qty: 20 TABLET | Refills: 0 | Status: SHIPPED | OUTPATIENT
Start: 2020-11-19 | End: 2020-11-29

## 2020-11-19 NOTE — TELEPHONE ENCOUNTER
She should still be getting the nightly Macrodantin. It is still possible to get a UTI even with this. In fact it is not uncommon because bacteria develop resistance. Will call in some Cipro. Cipro 500 mg    #20       1 twice daily. Hold Macrodantin while taking Cipro, then resume Macrodantin when finished with Cipro.

## 2020-11-19 NOTE — TELEPHONE ENCOUNTER
The patient daughter is reporting the patient must have a UTI because she has been very combative and caregiver has been unable to get a urine sample. They are requesting antibiotic. They are question if she is still taking the nightly nitrofurantoin (MACRODANTIN) 100 MG capsule    because she is continuing to get UTI.     Pharmacy on file verified

## 2020-11-19 NOTE — TELEPHONE ENCOUNTER
Tried to call pt daughter no answer no vm   No script has been sent due to not knowing where they want it sent since daughter is out of town

## 2020-11-20 ENCOUNTER — CARE COORDINATION (OUTPATIENT)
Dept: CARE COORDINATION | Age: 85
End: 2020-11-20

## 2020-11-20 NOTE — CARE COORDINATION
The pt is at [facility] and has 24/7 aide. Pt at some point may transition to the Memory Care Unit.

## 2020-11-21 LAB
ORGANISM: ABNORMAL
URINE CULTURE, ROUTINE: ABNORMAL

## 2020-12-11 ENCOUNTER — TELEPHONE (OUTPATIENT)
Dept: INTERNAL MEDICINE CLINIC | Age: 85
End: 2020-12-11

## 2020-12-11 NOTE — TELEPHONE ENCOUNTER
Bryce Has with [facility] called wanting to know if they can get orders for skilled nursing and physical therapy for the patient. Patient has had increased weakness, vitals are fine, it took two people to get her up and down tho the bathroom. She states she could bare weight but not stable. If this is ok they will need the home health order, list of her diagnosis, last progress note, mediation list and face sheet. You could fax that to:  [phone]. Please call to advise.

## 2020-12-15 ENCOUNTER — TELEPHONE (OUTPATIENT)
Dept: INTERNAL MEDICINE CLINIC | Age: 85
End: 2020-12-15

## 2020-12-15 NOTE — TELEPHONE ENCOUNTER
Samanta Garcia with [facility] called wanting to know if they can get orders for skilled nursing and physical therapy for the patient. Patient has had increased weakness, vitals are fine, it took two people to get her up and down tho the bathroom. She states she could bare weight but not stable. If this is ok they will need the home health order, list of her diagnosis, last progress note, mediation list and face sheet. You could fax that to:  [phone].      Please call to advise.

## 2020-12-16 RX ORDER — SIMVASTATIN 20 MG
TABLET ORAL
Qty: 30 TABLET | Refills: 0 | Status: SHIPPED | OUTPATIENT
Start: 2020-12-16

## 2020-12-17 ENCOUNTER — TELEPHONE (OUTPATIENT)
Dept: INTERNAL MEDICINE CLINIC | Age: 85
End: 2020-12-17

## 2020-12-17 NOTE — TELEPHONE ENCOUNTER
Blanca Jenkins [facility] needs verbal orders for P. T for pt.  Please advise   [phone]

## 2021-01-01 DIAGNOSIS — N39.0 URINARY TRACT INFECTION WITH HEMATURIA, SITE UNSPECIFIED: ICD-10-CM

## 2021-01-01 DIAGNOSIS — R31.9 URINARY TRACT INFECTION WITH HEMATURIA, SITE UNSPECIFIED: ICD-10-CM

## 2021-01-01 LAB
AMORPHOUS: NORMAL /HPF
BILIRUBIN URINE: NEGATIVE
BLOOD, URINE: ABNORMAL
CLARITY: CLEAR
COLOR: YELLOW
COMMENT UA: NORMAL
EPITHELIAL CELLS, UA: NORMAL /HPF (ref 0–5)
GLUCOSE URINE: NEGATIVE MG/DL
KETONES, URINE: NEGATIVE MG/DL
LEUKOCYTE ESTERASE, URINE: NEGATIVE
MICROSCOPIC EXAMINATION: YES
NITRITE, URINE: NEGATIVE
PH UA: 7 (ref 5–8)
PROTEIN UA: NEGATIVE MG/DL
RBC UA: NORMAL /HPF (ref 0–4)
SPECIFIC GRAVITY UA: 1.01 (ref 1–1.03)
URINE REFLEX TO CULTURE: ABNORMAL
URINE TYPE: ABNORMAL
UROBILINOGEN, URINE: 0.2 E.U./DL
WBC UA: NORMAL /HPF (ref 0–5)

## 2021-01-13 ENCOUNTER — TELEPHONE (OUTPATIENT)
Dept: INTERNAL MEDICINE CLINIC | Age: 86
End: 2021-01-13

## 2021-01-13 NOTE — TELEPHONE ENCOUNTER
Carl Comment with [facility] called stating that patient is being discharged from home physical therapy today. She is doing better and has met her goals.      Please call to advise

## 2021-01-19 ENCOUNTER — TELEPHONE (OUTPATIENT)
Dept: INTERNAL MEDICINE CLINIC | Age: 86
End: 2021-01-19

## 2021-01-19 DIAGNOSIS — R31.9 URINARY TRACT INFECTION WITH HEMATURIA, SITE UNSPECIFIED: Primary | ICD-10-CM

## 2021-01-19 DIAGNOSIS — N39.0 URINARY TRACT INFECTION WITH HEMATURIA, SITE UNSPECIFIED: Primary | ICD-10-CM

## 2021-01-19 NOTE — TELEPHONE ENCOUNTER
Sejal Monet with [facility] called to advise the patients UTI has gotten worse and would like to know if it will be ok to drop off a urine sample for testing. Please advise.

## 2021-01-20 ENCOUNTER — TELEPHONE (OUTPATIENT)
Dept: INTERNAL MEDICINE CLINIC | Age: 86
End: 2021-01-20

## 2021-01-20 NOTE — TELEPHONE ENCOUNTER
Foster Alaniz, patient's aid called stating the patient's daughter Amari Paniagua told her to call, the patient is being very combative, throwing stuff, used the bathroom on herself, won't let her change her, requesting to call 911, will not take her medication. Patient wants to speak to her doctor. Please call to advise.

## 2021-01-20 NOTE — TELEPHONE ENCOUNTER
Magalie Chauhan called back regarding pt.they have tried to have her talk to others. She wont' take her medicine she refusing to talk to anyone just cussing them out and hanging up, she is going to the restroom all over herself. She stated she is not herself at all and they are not sure what to do beside sit and watch her. The nurse asked that we call her daughter and gave me the following number       [phone] this number just kept ringing into a dead zone     [phone] spoke to alfredo the daughter gave MD advise that she needs to go to the ER.  Daughter stated she will call the aid back and let her know to call [phone]

## 2021-02-01 ENCOUNTER — TELEPHONE (OUTPATIENT)
Dept: INTERNAL MEDICINE CLINIC | Age: 86
End: 2021-02-01

## 2021-02-01 ENCOUNTER — VIRTUAL VISIT (OUTPATIENT)
Dept: INTERNAL MEDICINE CLINIC | Age: 86
End: 2021-02-01
Payer: MEDICARE

## 2021-02-01 DIAGNOSIS — G30.1 LATE ONSET ALZHEIMER'S DISEASE WITH BEHAVIORAL DISTURBANCE (HCC): ICD-10-CM

## 2021-02-01 DIAGNOSIS — F03.91 DEMENTIA WITH BEHAVIORAL DISTURBANCE, UNSPECIFIED DEMENTIA TYPE: ICD-10-CM

## 2021-02-01 DIAGNOSIS — F02.818 LATE ONSET ALZHEIMER'S DISEASE WITH BEHAVIORAL DISTURBANCE (HCC): ICD-10-CM

## 2021-02-01 PROCEDURE — 1090F PRES/ABSN URINE INCON ASSESS: CPT | Performed by: INTERNAL MEDICINE

## 2021-02-01 PROCEDURE — 99214 OFFICE O/P EST MOD 30 MIN: CPT | Performed by: INTERNAL MEDICINE

## 2021-02-01 PROCEDURE — 1123F ACP DISCUSS/DSCN MKR DOCD: CPT | Performed by: INTERNAL MEDICINE

## 2021-02-01 PROCEDURE — 4040F PNEUMOC VAC/ADMIN/RCVD: CPT | Performed by: INTERNAL MEDICINE

## 2021-02-01 PROCEDURE — G8427 DOCREV CUR MEDS BY ELIG CLIN: HCPCS | Performed by: INTERNAL MEDICINE

## 2021-02-01 RX ORDER — RISPERIDONE 0.25 MG/1
0.25 TABLET, FILM COATED ORAL 2 TIMES DAILY
Qty: 60 TABLET | Refills: 5 | Status: SHIPPED | OUTPATIENT
Start: 2021-02-01 | End: 2021-02-12
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
## 2021-02-01 NOTE — TELEPHONE ENCOUNTER
Discussed situation with the patient's daughter. Apparently the patient has even gotten aggressive with her recently over the phone. She may have to move her mother into the memory unit and is currently getting evaluated for this. She feels moving her to an assisted living situation would be no better.
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
## 2021-02-01 NOTE — TELEPHONE ENCOUNTER
Pt daughter alfredo calling because pt has an appt today and she wants to make sure a dementia eval is done and then wants a call from MD at end of day to let her know what is going on since she is in [place].

## 2021-02-02 RX ORDER — DONEPEZIL HYDROCHLORIDE 10 MG/1
TABLET, FILM COATED ORAL
Qty: 90 TABLET | Refills: 1 | Status: SHIPPED | OUTPATIENT
Start: 2021-02-02

## 2021-02-09 RX ORDER — POTASSIUM CHLORIDE 20 MEQ/1
20 TABLET, EXTENDED RELEASE ORAL 2 TIMES DAILY WITH MEALS
Qty: 60 TABLET | Refills: 1 | Status: SHIPPED | OUTPATIENT
Start: 2021-02-09

## 2021-02-10 NOTE — TELEPHONE ENCOUNTER
Patients daughter says she needs to speak to the doctor about her mothers medication.  Please [phone]   alfredo beard

## 2021-02-12 RX ORDER — RISPERIDONE 0.25 MG/1
0.25 TABLET, FILM COATED ORAL 2 TIMES DAILY
Qty: 60 TABLET | Refills: 5 | COMMUNITY
Start: 2021-02-12

## 2021-02-12 RX ORDER — RISPERIDONE 0.25 MG/1
TABLET, FILM COATED ORAL
Qty: 15 TABLET | Refills: 0
Start: 2021-02-12 | End: 2021-02-18

## 2021-02-12 NOTE — TELEPHONE ENCOUNTER
Discussed with daughter. Patient developed some tremors and is sedated on the Risperdal.  Will hold dose for 1 day and then restart at 1/2 tablet at bedtime only. Gaby-can you call and have them hold her risperidone and then change her risperidone 0.25 mg to 1/2 tablet at bedtime only starting tomorrow February 13, 2021.

## 2021-02-19 NOTE — TELEPHONE ENCOUNTER
Pt daughter is calling saying that they are having a hard time waking pt up the next morning. Pt daughter states medication may be too strong and pt can not tolerate. Only wants to speak to Dr. Ministerio Alas.   Please advise

## 2021-02-19 NOTE — TELEPHONE ENCOUNTER
Please call and tell her aids to stop the risperidone. Discussed with patient's daughter-patient is groggy in the morning. At least the combativeness has stopped. She will be moving to the memory care unit next week. Therefore we will stop the medication and see if she returns to baseline and if this can be handled nonmedicinally in the memory care unit.

## 2021-03-24 ENCOUNTER — APPOINTMENT (OUTPATIENT)
Dept: CT IMAGING | Age: 86
DRG: 177 | End: 2021-03-24
Payer: MEDICARE

## 2021-03-24 ENCOUNTER — HOSPITAL ENCOUNTER (INPATIENT)
Age: 86
LOS: 2 days | Discharge: HOME | DRG: 177 | End: 2021-03-26
Attending: EMERGENCY MEDICINE | Admitting: HOSPITALIST
Payer: MEDICARE

## 2021-03-24 ENCOUNTER — APPOINTMENT (OUTPATIENT)
Dept: GENERAL RADIOLOGY | Age: 86
DRG: 177 | End: 2021-03-24
Payer: MEDICARE

## 2021-03-24 DIAGNOSIS — R09.02 HYPOXIA: ICD-10-CM

## 2021-03-24 DIAGNOSIS — R41.82 ALTERED MENTAL STATUS, UNSPECIFIED ALTERED MENTAL STATUS TYPE: Primary | ICD-10-CM

## 2021-03-24 PROBLEM — J18.9 PNEUMONIA: Status: ACTIVE | Noted: 2021-03-24

## 2021-03-24 LAB
ANION GAP SERPL CALCULATED.3IONS-SCNC: 7 MMOL/L (ref 3–16)
BASE EXCESS VENOUS: 5.9 MMOL/L (ref -2–3)
BASOPHILS ABSOLUTE: 0 K/UL (ref 0–0.2)
BASOPHILS RELATIVE PERCENT: 0.7 %
BILIRUBIN URINE: NEGATIVE
BLOOD, URINE: NEGATIVE
BUN BLDV-MCNC: 21 MG/DL (ref 7–20)
CALCIUM SERPL-MCNC: 8.7 MG/DL (ref 8.3–10.6)
CARBOXYHEMOGLOBIN: 1.6 % (ref 0–1.5)
CHLORIDE BLD-SCNC: 103 MMOL/L (ref 99–110)
CLARITY: CLEAR
CO2: 31 MMOL/L (ref 21–32)
COLOR: YELLOW
CREAT SERPL-MCNC: 0.9 MG/DL (ref 0.6–1.2)
EKG ATRIAL RATE: 65 BPM
EKG DIAGNOSIS: NORMAL
EKG P AXIS: 85 DEGREES
EKG P-R INTERVAL: 174 MS
EKG Q-T INTERVAL: 390 MS
EKG QRS DURATION: 68 MS
EKG QTC CALCULATION (BAZETT): 405 MS
EKG R AXIS: 9 DEGREES
EKG T AXIS: 62 DEGREES
EKG VENTRICULAR RATE: 65 BPM
EOSINOPHILS ABSOLUTE: 0.1 K/UL (ref 0–0.6)
EOSINOPHILS RELATIVE PERCENT: 1.6 %
GFR AFRICAN AMERICAN: >60
GFR NON-AFRICAN AMERICAN: 58
GLUCOSE BLD-MCNC: 86 MG/DL (ref 70–99)
GLUCOSE URINE: NEGATIVE MG/DL
HCO3 VENOUS: 32 MMOL/L (ref 24–28)
HCT VFR BLD CALC: 27.4 % (ref 36–48)
HEMOGLOBIN, VEN, REDUCED: 18.5 %
HEMOGLOBIN: 9 G/DL (ref 12–16)
KETONES, URINE: NEGATIVE MG/DL
LACTIC ACID: 0.7 MMOL/L (ref 0.4–2)
LEUKOCYTE ESTERASE, URINE: NEGATIVE
LYMPHOCYTES ABSOLUTE: 0.3 K/UL (ref 1–5.1)
LYMPHOCYTES RELATIVE PERCENT: 9.8 %
MCH RBC QN AUTO: 30.9 PG (ref 26–34)
MCHC RBC AUTO-ENTMCNC: 32.7 G/DL (ref 31–36)
MCV RBC AUTO: 94.4 FL (ref 80–100)
METHEMOGLOBIN VENOUS: 0.1 % (ref 0–1.5)
MICROSCOPIC EXAMINATION: NORMAL
MONOCYTES ABSOLUTE: 0.4 K/UL (ref 0–1.3)
MONOCYTES RELATIVE PERCENT: 11.2 %
NEUTROPHILS ABSOLUTE: 2.7 K/UL (ref 1.7–7.7)
NEUTROPHILS RELATIVE PERCENT: 76.7 %
NITRITE, URINE: NEGATIVE
O2 SAT, VEN: 81 %
PCO2, VEN: 54.9 MMHG (ref 41–51)
PDW BLD-RTO: 15.6 % (ref 12.4–15.4)
PH UA: 7.5 (ref 5–8)
PH VENOUS: 7.37 (ref 7.35–7.45)
PLATELET # BLD: 220 K/UL (ref 135–450)
PMV BLD AUTO: 8.2 FL (ref 5–10.5)
PO2, VEN: 49.5 MMHG (ref 25–40)
POTASSIUM REFLEX MAGNESIUM: 4.4 MMOL/L (ref 3.5–5.1)
PRO-BNP: 423 PG/ML (ref 0–449)
PROCALCITONIN: 0.17 NG/ML (ref 0–0.15)
PROTEIN UA: NEGATIVE MG/DL
RAPID INFLUENZA  B AGN: NEGATIVE
RAPID INFLUENZA A AGN: NEGATIVE
RBC # BLD: 2.9 M/UL (ref 4–5.2)
SODIUM BLD-SCNC: 141 MMOL/L (ref 136–145)
SPECIFIC GRAVITY UA: 1.01 (ref 1–1.03)
TCO2 CALC VENOUS: 34 MMOL/L
TROPONIN: <0.01 NG/ML
URINE TYPE: NORMAL
UROBILINOGEN, URINE: 0.2 E.U./DL
WBC # BLD: 3.5 K/UL (ref 4–11)

## 2021-03-24 PROCEDURE — 87804 INFLUENZA ASSAY W/OPTIC: CPT

## 2021-03-24 PROCEDURE — 2580000003 HC RX 258: Performed by: STUDENT IN AN ORGANIZED HEALTH CARE EDUCATION/TRAINING PROGRAM

## 2021-03-24 PROCEDURE — 93005 ELECTROCARDIOGRAM TRACING: CPT | Performed by: PHYSICIAN ASSISTANT

## 2021-03-24 PROCEDURE — 1200000000 HC SEMI PRIVATE

## 2021-03-24 PROCEDURE — 96365 THER/PROPH/DIAG IV INF INIT: CPT

## 2021-03-24 PROCEDURE — 81003 URINALYSIS AUTO W/O SCOPE: CPT

## 2021-03-24 PROCEDURE — 2580000003 HC RX 258: Performed by: PHYSICIAN ASSISTANT

## 2021-03-24 PROCEDURE — 71260 CT THORAX DX C+: CPT

## 2021-03-24 PROCEDURE — 99222 1ST HOSP IP/OBS MODERATE 55: CPT | Performed by: HOSPITALIST

## 2021-03-24 PROCEDURE — P9612 CATHETERIZE FOR URINE SPEC: HCPCS

## 2021-03-24 PROCEDURE — 6360000002 HC RX W HCPCS: Performed by: PHYSICIAN ASSISTANT

## 2021-03-24 PROCEDURE — U0005 INFEC AGEN DETEC AMPLI PROBE: HCPCS

## 2021-03-24 PROCEDURE — U0003 INFECTIOUS AGENT DETECTION BY NUCLEIC ACID (DNA OR RNA); SEVERE ACUTE RESPIRATORY SYNDROME CORONAVIRUS 2 (SARS-COV-2) (CORONAVIRUS DISEASE [COVID-19]), AMPLIFIED PROBE TECHNIQUE, MAKING USE OF HIGH THROUGHPUT TECHNOLOGIES AS DESCRIBED BY CMS-2020-01-R: HCPCS

## 2021-03-24 PROCEDURE — 80048 BASIC METABOLIC PNL TOTAL CA: CPT

## 2021-03-24 PROCEDURE — 70450 CT HEAD/BRAIN W/O DYE: CPT

## 2021-03-24 PROCEDURE — 84484 ASSAY OF TROPONIN QUANT: CPT

## 2021-03-24 PROCEDURE — 83605 ASSAY OF LACTIC ACID: CPT

## 2021-03-24 PROCEDURE — 83880 ASSAY OF NATRIURETIC PEPTIDE: CPT

## 2021-03-24 PROCEDURE — 84145 PROCALCITONIN (PCT): CPT

## 2021-03-24 PROCEDURE — 85025 COMPLETE CBC W/AUTO DIFF WBC: CPT

## 2021-03-24 PROCEDURE — 87040 BLOOD CULTURE FOR BACTERIA: CPT

## 2021-03-24 PROCEDURE — 6360000004 HC RX CONTRAST MEDICATION: Performed by: PHYSICIAN ASSISTANT

## 2021-03-24 PROCEDURE — 99285 EMERGENCY DEPT VISIT HI MDM: CPT

## 2021-03-24 PROCEDURE — 71045 X-RAY EXAM CHEST 1 VIEW: CPT

## 2021-03-24 PROCEDURE — 82803 BLOOD GASES ANY COMBINATION: CPT

## 2021-03-24 PROCEDURE — 6370000000 HC RX 637 (ALT 250 FOR IP): Performed by: STUDENT IN AN ORGANIZED HEALTH CARE EDUCATION/TRAINING PROGRAM

## 2021-03-24 RX ORDER — SODIUM CHLORIDE 9 MG/ML
INJECTION, SOLUTION INTRAVENOUS CONTINUOUS
Status: DISCONTINUED | OUTPATIENT
Start: 2021-03-24 | End: 2021-03-26 | Stop reason: HOSPADM

## 2021-03-24 RX ORDER — DONEPEZIL HYDROCHLORIDE 10 MG/1
10 TABLET, FILM COATED ORAL NIGHTLY
Status: DISCONTINUED | OUTPATIENT
Start: 2021-03-24 | End: 2021-03-26 | Stop reason: HOSPADM

## 2021-03-24 RX ORDER — ASPIRIN 81 MG/1
81 TABLET ORAL DAILY
Status: DISCONTINUED | OUTPATIENT
Start: 2021-03-24 | End: 2021-03-26 | Stop reason: HOSPADM

## 2021-03-24 RX ORDER — FUROSEMIDE 40 MG/1
40 TABLET ORAL DAILY
COMMUNITY

## 2021-03-24 RX ORDER — SODIUM CHLORIDE 0.9 % (FLUSH) 0.9 %
10 SYRINGE (ML) INJECTION EVERY 12 HOURS SCHEDULED
Status: DISCONTINUED | OUTPATIENT
Start: 2021-03-24 | End: 2021-03-26 | Stop reason: HOSPADM

## 2021-03-24 RX ORDER — ONDANSETRON 2 MG/ML
4 INJECTION INTRAMUSCULAR; INTRAVENOUS EVERY 6 HOURS PRN
Status: DISCONTINUED | OUTPATIENT
Start: 2021-03-24 | End: 2021-03-26 | Stop reason: HOSPADM

## 2021-03-24 RX ORDER — PROMETHAZINE HYDROCHLORIDE 25 MG/1
12.5 TABLET ORAL EVERY 6 HOURS PRN
Status: DISCONTINUED | OUTPATIENT
Start: 2021-03-24 | End: 2021-03-26 | Stop reason: HOSPADM

## 2021-03-24 RX ORDER — ATORVASTATIN CALCIUM 10 MG/1
10 TABLET, FILM COATED ORAL NIGHTLY
Status: DISCONTINUED | OUTPATIENT
Start: 2021-03-24 | End: 2021-03-26 | Stop reason: HOSPADM

## 2021-03-24 RX ORDER — POLYETHYLENE GLYCOL 3350 17 G/17G
17 POWDER, FOR SOLUTION ORAL DAILY PRN
Status: DISCONTINUED | OUTPATIENT
Start: 2021-03-24 | End: 2021-03-26 | Stop reason: HOSPADM

## 2021-03-24 RX ORDER — ACETAMINOPHEN 325 MG/1
650 TABLET ORAL EVERY 6 HOURS PRN
Status: DISCONTINUED | OUTPATIENT
Start: 2021-03-24 | End: 2021-03-26 | Stop reason: HOSPADM

## 2021-03-24 RX ORDER — SODIUM CHLORIDE 0.9 % (FLUSH) 0.9 %
10 SYRINGE (ML) INJECTION PRN
Status: DISCONTINUED | OUTPATIENT
Start: 2021-03-24 | End: 2021-03-26 | Stop reason: HOSPADM

## 2021-03-24 RX ORDER — ACETAMINOPHEN 650 MG/1
650 SUPPOSITORY RECTAL EVERY 6 HOURS PRN
Status: DISCONTINUED | OUTPATIENT
Start: 2021-03-24 | End: 2021-03-26 | Stop reason: HOSPADM

## 2021-03-24 RX ADMIN — CEFEPIME 1000 MG: 1 INJECTION, POWDER, FOR SOLUTION INTRAMUSCULAR; INTRAVENOUS at 14:11

## 2021-03-24 RX ADMIN — ATORVASTATIN CALCIUM 10 MG: 10 TABLET, FILM COATED ORAL at 21:29

## 2021-03-24 RX ADMIN — DONEPEZIL HYDROCHLORIDE 10 MG: 10 TABLET, FILM COATED ORAL at 21:29

## 2021-03-24 RX ADMIN — APIXABAN 2.5 MG: 2.5 TABLET, FILM COATED ORAL at 21:28

## 2021-03-24 RX ADMIN — SODIUM CHLORIDE: 9 INJECTION, SOLUTION INTRAVENOUS at 21:25

## 2021-03-24 RX ADMIN — IOPAMIDOL 80 ML: 755 INJECTION, SOLUTION INTRAVENOUS at 12:23

## 2021-03-24 RX ADMIN — VANCOMYCIN HYDROCHLORIDE 1000 MG: 10 INJECTION, POWDER, LYOPHILIZED, FOR SOLUTION INTRAVENOUS at 16:19

## 2021-03-24 RX ADMIN — ASPIRIN 81 MG: 81 TABLET, COATED ORAL at 21:25

## 2021-03-24 NOTE — ED NOTES
Bed: B15-15  Expected date:   Expected time:   Means of arrival:   Comments:  [address]?      Mathew Causey RN  03/24/21 4444

## 2021-03-24 NOTE — H&P
Internal Medicine PGY-1 Resident History & Physical      PCP: Macey Valle MD    Date of Admission: 3/24/2021      Chief Complaint:  Altered Mental Status (pt stated she couldn't breathe and then became less responsive, 74% on RA, up to 100% on 4L, given 2mg narcan IV by EMS)  Keith Paez is a 79 yo F with PMH of Dementia, Afib on Eliquis, HTN, HLD, CAD who presents from the [address] [place] [address]. Pt has Alzheimer's dementia and is not able to provide history. History was obtained from pt's nurse at the nursing home and pt's POA daughter Shukri Delaney. Per pt's nurse the PCA went to check on the pt this morning and the pt was stating that she was needing air and her SPO2 was 74% on 2L NC. The pt later became unresponsive to chest rub or verbal stimuli. On the way to the hospital the pt was placed on 4L of O2. Pt is not on any oxygen at baseline. EMS. Per pt's nurse pt has severe dementia, is normally only oriented to self and is 'there sometimes and other times she's not.' Pt is unable to do her ADLs and requires assistance with most tasks. She requires assistance using a walker and most times uses a wheelchair. Pt was recently transferred from the independent living to the memory care unit at [facility]. She had a Covid test on Feb 24th that was negative. Spoke with pt's POA, pt's daughter Shukri Delaney, who lives in [place] and she confirmed that pt has advanced dementia. Daughter confirmed that the pt is fully vaccinated. She states that pt normally has low BP. Pt's daughter stated that in case of a cardiac arrest she would not like the pt to be resuscitated. However, in case of respiratory decline she would like the pt to be intubated and in case that the pt required pressure support she would like the pt to be given pressors. At the time of the encounter pt is fully alert and responsive. She is oriented to self only, but not oriented to age, place (thinks she is in her back yard), year or situation.  She does not remember details about the event this morning or details about her medical history. Pt complaining of pain in her left scapular region with tenderness to palpation over the area, but does not have tenderness over the spine. ED course: Labs remarkable for WBC of 3.5, hemoglobin of 9, creatinine 0.9, BUN 21, lactate 0.7, , negative troponin. pH normal but with respiratory acidosis and metabolic alkalosis. EKG obtained revealed normal sinus rhythm with PAC, no acute ischemic changes. Chest x-ray revealed no significant change in moderately large bilateral lower lobe pleural parenchymal opacities. CT head negative. CTPA was obtained in further evaluation. This revealed no evidence of PE. It did reveal bibasilar subpleural atelectasis or pneumonia. Patient was swabbed for COVID-19. Pt was given one dose of vanc and cefepime in the ED. History Of Present Illness:      80 y.o. 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 Authorizing Provider   risperiDONE (RISPERDAL) 0.25 MG tablet 1/2 tab nightly.  2/12/21   Frantz Gurrola MD   potassium chloride (KLOR-CON M) 20 MEQ extended release tablet TAKE 1 TABLET BY MOUTH 2 TIMES DAILY (WITH MEALS) 2/9/21   Carina Alcaraz MD   donepezil (ARICEPT) 10 MG tablet 1 TABLET BY MOUTH AT BEDTIME 2/2/21   Carina Alcaraz MD   apixaban (ELIQUIS) 2.5 MG TABS tablet 1 TABLET BY MOUTH TWICE DAILY 12/21/20   Carina Alcaraz MD   simvastatin (ZOCOR) 20 MG tablet 1 TABLET BY MOUTH AT BEDTIME * NO GRAPEFRUIT * 12/16/20   Carina Alcaraz MD   furosemide (LASIX) 20 MG tablet TAKE 1 TABLET BY MOUTH 2 TIMES DAILY  MORNING AND EVENING 10/28/20   Carina Alcaraz MD   nitrofurantoin (MACRODANTIN) 100 MG capsule Take 100 mg by mouth nightly    Historical Provider, MD   ferrous sulfate (IRON 325) 325 (65 Fe) MG tablet Take 325 mg by mouth daily (with breakfast)    Historical Provider, MD   Calcium Carb-Cholecalciferol (CALCIUM 600 + D) 600-200 MG-UNIT TABS Take 1 tablet by mouth daily    Historical Provider, MD   metoprolol tartrate (LOPRESSOR) 25 MG tablet Take 0.5 tablets by mouth 2 times daily 8/5/20   Carina Alcaraz MD   amLODIPine (NORVASC) 10 MG tablet 1 TABLET BY MOUTH EVERY MORNING 5/5/20   Carina Alcaraz MD   vitamin B-12 (CYANOCOBALAMIN) 1000 MCG tablet Take 1 tablet by mouth daily 12/19/19   Carina Alcaraz MD   famotidine (PEPCID) 20 MG tablet TAKE 1 TABLET BY MOUTH DAILY 5/30/19   Carina Alcaraz MD   aspirin 81 MG EC tablet Take 81 mg by mouth daily. Historical Provider, MD       Allergies:  Pcn [penicillins] and Sulfa antibiotics    Social History:      TOBACCO:   reports that she has quit smoking. She has never used smokeless tobacco.  ETOH:  reports no history of alcohol use. Family History:         Problem Relation Age of Onset    Heart Attack Mother     Heart Disease Mother     Heart Attack Father     Heart Disease Father     Other Daughter        REVIEW OF SYSTEMS:   Unable to obtain due to severe dementia and pt unable to remember.         PHYSICAL EXAM PERFORMED:    BP (!) 105/48   Pulse 53   Temp 98.4 °F (36.9 °C) (Axillary)   Resp 15   Ht 5' 3\" (1.6 m)   Wt 126 lb (57.2 kg)   SpO2 96%   BMI 22.32 kg/m²     General appearance:  No apparent distress, appears stated age and cooperative. HEENT:  Normal cephalic,atraumatic without obvious deformity. Pupils equal, round, and reactive to light. Extra ocular muscles intact. Conjunctivae/corneas clear. Neck: Supple, with full range of motion. No jugular venous distention. Trachea midline. Respiratory:  Normal respiratory effort. Clear to auscultation, bilaterally without Rales/Wheezes/Rhonchi. Cardiovascular:  Regular rate and rhythm with normal S1/S2 without murmurs, rubs or gallops. Abdomen: Soft, non-tender, non-distended with normal bowel sounds. Musculoskeletal: Tenderness to palpation over left scapula. No clubbing, cyanosis oredema bilaterally. Full range of motion without deformity. Skin: Skin color, texture, turgor normal.  Norashes or lesions. Neurologic:  Neurovascularly intact without any focal sensory/motor deficits. Cranialnerves: II-XII intact, grossly non-focal.  Psychiatric:  Alert. Oriented to self only. Severe dementia at baseline. Capillary Refill: Brisk,< 3 seconds   Peripheral Pulses: +2 palpable, equal bilaterally       Labs:     Recent Labs     03/24/21  1117   WBC 3.5*   HGB 9.0*   HCT 27.4*        Recent Labs     03/24/21  1117      K 4.4      CO2 31   BUN 21*   CREATININE 0.9   CALCIUM 8.7     No results for input(s): AST, ALT, BILIDIR, BILITOT, ALKPHOS in the last 72 hours. No results for input(s): INR in the last 72 hours. Recent Labs     03/24/21  1117   TROPONINI <0.01       Urinalysis:      Lab Results   Component Value Date    NITRU Negative 03/24/2021    WBCUA 0-2 01/19/2021    BACTERIA 4+ 10/05/2020    RBCUA 0-2 01/19/2021    BLOODU Negative 03/24/2021    SPECGRAV 1.010 03/24/2021    GLUCOSEU Negative 03/24/2021       Radiology:       CT CHEST PULMONARY EMBOLISM W CONTRAST   Final Result   1.  No evidence of acute or chronic pulmonary embolus      2. Interval decrease in size of bilateral pleural effusions   3. Bibasilar subpleural atelectasis or pneumonia   4. Stable multiple pulmonary nodules, the largest measuring 10 mm in the right middle lobe, follow-up in one year is recommended         CT HEAD WO CONTRAST   Final Result      1. Atrophy and superimposed mild small vessel ischemic disease, similar to prior study of 3/19/2019 with no evidence of recent infarct, hemorrhage or mass. XR CHEST PORTABLE   Final Result      No significant change in moderately large bilateral lower lobe pleural-parenchymal opacities                ASSESSMENT & PLAN:  There are no active hospital problems to display for this patient. Roslyn Freeman is a 80 y.o. female who presents w/ Altered Mental Status (pt stated she couldn't breathe and then became less responsive, 74% on RA, up to 100% on 4L, given 2mg narcan IV by EMS)      Acute metabolic encephalopathy - SPO2 was found to be 74% earlier this morning; requiring 4L O2 on the way to the hospital. Pt has advanced Alzheimer's dementia. Is alert and oriented to self only during encounter, which seems to be at baseline  - etiologies include PNA v apneic episode v deep sleep  - EKG revealed normal sinus rhythm with PAC, no acute ischemic changes. - Chest x-ray revealed no significant change in moderately large bilateral lower lobe pleural parenchymal opacities. - CT head negative. - CTPA revealed no evidence of PE.   It did reveal bibasilar subpleural atelectasis or pneumonia.  - f/u COVID-19 test.   - s/p one dose of vanc and cefepime in the ED  - gentle fluids hydration   - f/u procalcitonin  - hold off on antibiotics - if pt becomes hypoxic or if procalcitonin is positive, then start antibiotics  - incentive spirometry  - f/u blood cultures  - f/u respiratory viral panel, Legionella antigen, Strep antigen, rapid flu      Chronic hypotension - per POA daughter Ofelia Christensen pt has low BP normally  - monitor BP  - monitor I/Os    Alzheimer dementia  - continue donepezil    Afib   - continue Eliquis    HTN   - hold antihypertensives    HLD  - continue lipitor    CAD  - continue ASA      DVT Prophylaxis: Eliquis  Diet: No diet orders on file  Code Status: Prior  PT/OT Eval Status: ordered  Dispo - [facility]      I will discuss the patient with the senior resident and Dr. Rudy Allen MD.      Naseem Tran MD  Internal Medicine Resident, PGY-1  03/24/21     Addendum to Resident H& P/Progress note:  I have personally seen,examined and evaluated the patient.  I have reviewed the current history, physical findings, labs and assessment and plan and agree with note as documented by resident MD ( Angie Frankel)  + 2/6 RC over aortic valve and LLSB/ apex areas  + volume depletion; will proceed with gentle IV hydration    Roberto Nielsen MD, Cherry Wright

## 2021-03-24 NOTE — PROGRESS NOTES
Clinical Pharmacy Progress Note  Medication History     Admit Date: 3/24/2021    List of current medications patient is taking is complete. Home medication list in EPIC updated to reflect changes noted below. Source of information: [employer] medication list    The following medication instructions/doses were adjusted to reflect how patient is taking:  Furosemide 20mg BID changed to 40mg daily   Risperidal 0.125mg nightly changed to 0.25mg BID       Please call with questions.   Ion Teresa PharmD, [address] [place] [address] Pharmacy: [phone]  [address] [place] [address]: [phone]  3/24/2021 3:07 PM

## 2021-03-24 NOTE — ED NOTES
Patient JOSEFAA from [address] [place] [address] for ams. Peripheral IV placed using aseptic technique. Blood collected, labeled and sent to lab. Patient placed on monitor. Bed locked and in lowest position. Call light within reach.      Rambo Melendrez RN  03/24/21 4423

## 2021-03-24 NOTE — ED NOTES
Report called to [place], [facility], JIMBO  03/24/21 2060

## 2021-03-24 NOTE — ED PROVIDER NOTES
distress. Breath sounds: Normal breath sounds. No wheezing, rhonchi or rales. Abdominal:      General: Bowel sounds are normal. There is no distension. Palpations: Abdomen is soft. Tenderness: There is no abdominal tenderness. There is no guarding or rebound. Musculoskeletal:         General: No deformity. Skin:     General: Skin is warm and dry. Neurological:      Comments: Patient lethargic. She will open her eyes to sternal rub and verbal stimuli. She will nod her head yes or no. She is able to hold her extremities off the bed for several seconds. Psychiatric:      Comments: Unable to assess         Diagnostic Results     EKG   Interpreted in conjunction with emergencydepartment physician No att. providers found  Rhythm: normal sinus   Rate: normal  Axis: normal  Ectopy: none  Conduction: normal  ST Segments: no acute change  T Waves:no acute change  Q Waves: none  Clinical Impression: non-specific EKG  Comparison:  9/21/2020    RADIOLOGY:  CT CHEST PULMONARY EMBOLISM W CONTRAST   Final Result   1. No evidence of acute or chronic pulmonary embolus      2. Interval decrease in size of bilateral pleural effusions   3. Bibasilar subpleural atelectasis or pneumonia   4. Stable multiple pulmonary nodules, the largest measuring 10 mm in the right middle lobe, follow-up in one year is recommended         CT HEAD WO CONTRAST   Final Result      1. Atrophy and superimposed mild small vessel ischemic disease, similar to prior study of 3/19/2019 with no evidence of recent infarct, hemorrhage or mass.         XR CHEST PORTABLE   Final Result      No significant change in moderately large bilateral lower lobe pleural-parenchymal opacities                LABS:   Results for orders placed or performed during the hospital encounter of 03/24/21   CBC Auto Differential   Result Value Ref Range    WBC 3.5 (L) 4.0 - 11.0 K/uL    RBC 2.90 (L) 4.00 - 5.20 M/uL    Hemoglobin 9.0 (L) 12.0 - 16.0 g/dL Hematocrit 27.4 (L) 36.0 - 48.0 %    MCV 94.4 80.0 - 100.0 fL    MCH 30.9 26.0 - 34.0 pg    MCHC 32.7 31.0 - 36.0 g/dL    RDW 15.6 (H) 12.4 - 15.4 %    Platelets 491 194 - 488 K/uL    MPV 8.2 5.0 - 10.5 fL    Neutrophils % 76.7 %    Lymphocytes % 9.8 %    Monocytes % 11.2 %    Eosinophils % 1.6 %    Basophils % 0.7 %    Neutrophils Absolute 2.7 1.7 - 7.7 K/uL    Lymphocytes Absolute 0.3 (L) 1.0 - 5.1 K/uL    Monocytes Absolute 0.4 0.0 - 1.3 K/uL    Eosinophils Absolute 0.1 0.0 - 0.6 K/uL    Basophils Absolute 0.0 0.0 - 0.2 K/uL   Basic Metabolic Panel w/ Reflex to MG   Result Value Ref Range    Sodium 141 136 - 145 mmol/L    Potassium reflex Magnesium 4.4 3.5 - 5.1 mmol/L    Chloride 103 99 - 110 mmol/L    CO2 31 21 - 32 mmol/L    Anion Gap 7 3 - 16    Glucose 86 70 - 99 mg/dL    BUN 21 (H) 7 - 20 mg/dL    CREATININE 0.9 0.6 - 1.2 mg/dL    GFR Non-African American 58 (A) >60    GFR African American >60 >60    Calcium 8.7 8.3 - 10.6 mg/dL   Troponin   Result Value Ref Range    Troponin <0.01 <0.01 ng/mL   Brain Natriuretic Peptide   Result Value Ref Range    Pro- 0 - 449 pg/mL   Blood gas, venous (Lab)   Result Value Ref Range    pH, Emmanuel 7.374 7.350 - 7.450    pCO2, Emmanuel 54.9 (H) 41.0 - 51.0 mmHg    pO2, Emmanuel 49.5 (H) 25 - 40 mmHg    HCO3, Venous 32.0 (H) 24.0 - 28.0 mmol/L    Base Excess, Emmanuel 5.9 (H) -2.0 - 3.0 mmol/L    O2 Sat, Emmanuel 81 Not established %    Carboxyhemoglobin 1.6 (H) 0.0 - 1.5 %    MetHgb, Emmanuel 0.1 0.0 - 1.5 %    TC02 (Calc), Emmanuel 34 mmol/L    Hemoglobin, Emmanuel, Reduced 18.50 %   Lactic Acid, Plasma   Result Value Ref Range    Lactic Acid 0.7 0.4 - 2.0 mmol/L   EKG 12 Lead   Result Value Ref Range    Ventricular Rate 65 BPM    Atrial Rate 65 BPM    P-R Interval 174 ms    QRS Duration 68 ms    Q-T Interval 390 ms    QTc Calculation (Bazett) 405 ms    P Axis 85 degrees    R Axis 9 degrees    T Axis 62 degrees    Diagnosis       EKG performed in ER and to be interpreted by ER physician. Confirmed by MD, ER ([id number]),  Liat Cardenas (OLIVIER) HOLLY ([id number]) on 3/24/2021 10:43:25 AM     RECENT VITALS:  BP: (!) 109/44, Temp: 98.4 °F (36.9 °C), Pulse: 60,Resp: 15, SpO2: 100 %     Procedures         ED Course     Nursing Notes, Past Medical Hx, Past Surgical Hx, Social Hx, Allergies, and Family Hx were reviewed. The patient was given the followingmedications:  Orders Placed This Encounter   Medications    iopamidol (ISOVUE-370) 76 % injection 80 mL    cefepime (MAXIPIME) 1000 mg IVPB minibag     Order Specific Question:   Antimicrobial Indications     Answer:   Pneumonia (HAP)       CONSULTS:  None    MEDICAL DECISION MAKING / Ayana Gay / Gricelda Cotton is a 80 y.o. female with PMH of Dementia, Afib on Eliquis, HTN, HLD, CAD who [facility] Altered mental status. Per nursing staff, patient stated that she could not breathe this morning. Her SPO2 was found to be 74%. Nursing left to gravity blood pressure monitor and patient became unresponsive to sternal rub or verbal stimuli. Patient was placed on 4 L nasal cannula by EMS. She was given 2 mg of IV Narcan by EMS without response. At the time of her ED arrival, she is saturating 97% on 4 L nasal cannula. She has normal vital signs. She is resting with her eyes closed but opens her eyes to sternal rub or verbal stimuli. She nods her head or states no to questions. She is able to lift all 4 extremities off the bed. Heart rhythm regular. Lungs clear to auscultation. Abdomen nontender to palpation. EKG obtained revealed normal sinus rhythm with PAC, no acute ischemic changes. Chest x-ray revealed no significant change in moderately large bilateral lower lobe pleural parenchymal opacities. CT head negative. IV access was established. Labs including CBC, BMP, troponin, VBG, lactate were obtained. Labs remarkable for WBC of 3.5, hemoglobin of 9, creatinine 0.9, BUN 21, lactate 0.7, , negative troponin. pH normal but with respiratory acidosis and metabolic alkalosis. Given patient's change in mental status and new hypoxia, CTPA was obtained in further evaluation. This revealed no evidence of PE. It did reveal bibasilar subpleural atelectasis or pneumonia. Patient was swabbed for COVID-19. Given CXR findings and hypoxia, she will be placed on empiric broad-spectrum antibiotics to cover for HCAP (given she resides in a nursing facility). Plan to admit to medicine at this time for ongoing w/u and evaluation of Altered Mental status and Hypoxia. This patient was also evaluated by the attending physician. All care plans were discussed and agreed upon. Clinical Impression     1. Altered mental status, unspecified altered mental status type    2. Hypoxia        Disposition      DISPOSITION  Admit.        Juan Lal PA-C  03/24/21 3015

## 2021-03-25 LAB
ANION GAP SERPL CALCULATED.3IONS-SCNC: 8 MMOL/L (ref 3–16)
BASOPHILS ABSOLUTE: 0 K/UL (ref 0–0.2)
BASOPHILS RELATIVE PERCENT: 0.8 %
BUN BLDV-MCNC: 19 MG/DL (ref 7–20)
CALCIUM SERPL-MCNC: 8.5 MG/DL (ref 8.3–10.6)
CHLORIDE BLD-SCNC: 105 MMOL/L (ref 99–110)
CO2: 29 MMOL/L (ref 21–32)
CREAT SERPL-MCNC: 0.8 MG/DL (ref 0.6–1.2)
EOSINOPHILS ABSOLUTE: 0.1 K/UL (ref 0–0.6)
EOSINOPHILS RELATIVE PERCENT: 1.8 %
GFR AFRICAN AMERICAN: >60
GFR NON-AFRICAN AMERICAN: >60
GLUCOSE BLD-MCNC: 70 MG/DL (ref 70–99)
HCT VFR BLD CALC: 27 % (ref 36–48)
HEMOGLOBIN: 9.1 G/DL (ref 12–16)
LYMPHOCYTES ABSOLUTE: 0.3 K/UL (ref 1–5.1)
LYMPHOCYTES RELATIVE PERCENT: 9.7 %
MCH RBC QN AUTO: 31.4 PG (ref 26–34)
MCHC RBC AUTO-ENTMCNC: 33.7 G/DL (ref 31–36)
MCV RBC AUTO: 93.4 FL (ref 80–100)
MONOCYTES ABSOLUTE: 0.4 K/UL (ref 0–1.3)
MONOCYTES RELATIVE PERCENT: 11.1 %
NEUTROPHILS ABSOLUTE: 2.6 K/UL (ref 1.7–7.7)
NEUTROPHILS RELATIVE PERCENT: 76.6 %
PDW BLD-RTO: 15.6 % (ref 12.4–15.4)
PLATELET # BLD: 205 K/UL (ref 135–450)
PMV BLD AUTO: 8.4 FL (ref 5–10.5)
POTASSIUM REFLEX MAGNESIUM: 3.9 MMOL/L (ref 3.5–5.1)
RBC # BLD: 2.89 M/UL (ref 4–5.2)
SARS-COV-2, PCR: NOT DETECTED
SODIUM BLD-SCNC: 142 MMOL/L (ref 136–145)
WBC # BLD: 3.4 K/UL (ref 4–11)

## 2021-03-25 PROCEDURE — 97162 PT EVAL MOD COMPLEX 30 MIN: CPT

## 2021-03-25 PROCEDURE — 2580000003 HC RX 258: Performed by: STUDENT IN AN ORGANIZED HEALTH CARE EDUCATION/TRAINING PROGRAM

## 2021-03-25 PROCEDURE — 92610 EVALUATE SWALLOWING FUNCTION: CPT

## 2021-03-25 PROCEDURE — 1200000000 HC SEMI PRIVATE

## 2021-03-25 PROCEDURE — 97535 SELF CARE MNGMENT TRAINING: CPT

## 2021-03-25 PROCEDURE — 97166 OT EVAL MOD COMPLEX 45 MIN: CPT

## 2021-03-25 PROCEDURE — 87449 NOS EACH ORGANISM AG IA: CPT

## 2021-03-25 PROCEDURE — 36415 COLL VENOUS BLD VENIPUNCTURE: CPT

## 2021-03-25 PROCEDURE — 6370000000 HC RX 637 (ALT 250 FOR IP): Performed by: STUDENT IN AN ORGANIZED HEALTH CARE EDUCATION/TRAINING PROGRAM

## 2021-03-25 PROCEDURE — 85025 COMPLETE CBC W/AUTO DIFF WBC: CPT

## 2021-03-25 PROCEDURE — 80048 BASIC METABOLIC PNL TOTAL CA: CPT

## 2021-03-25 PROCEDURE — 92526 ORAL FUNCTION THERAPY: CPT

## 2021-03-25 PROCEDURE — 97530 THERAPEUTIC ACTIVITIES: CPT

## 2021-03-25 PROCEDURE — 99232 SBSQ HOSP IP/OBS MODERATE 35: CPT | Performed by: HOSPITALIST

## 2021-03-25 RX ADMIN — ATORVASTATIN CALCIUM 10 MG: 10 TABLET, FILM COATED ORAL at 19:58

## 2021-03-25 RX ADMIN — APIXABAN 2.5 MG: 2.5 TABLET, FILM COATED ORAL at 09:05

## 2021-03-25 RX ADMIN — DONEPEZIL HYDROCHLORIDE 10 MG: 10 TABLET, FILM COATED ORAL at 19:58

## 2021-03-25 RX ADMIN — ASPIRIN 81 MG: 81 TABLET, COATED ORAL at 09:05

## 2021-03-25 RX ADMIN — SODIUM CHLORIDE: 9 INJECTION, SOLUTION INTRAVENOUS at 16:36

## 2021-03-25 RX ADMIN — APIXABAN 2.5 MG: 2.5 TABLET, FILM COATED ORAL at 19:58

## 2021-03-25 ASSESSMENT — PAIN SCALES - GENERAL
PAINLEVEL_OUTOF10: 0

## 2021-03-25 ASSESSMENT — PAIN DESCRIPTION - LOCATION: LOCATION: BACK

## 2021-03-25 ASSESSMENT — PAIN DESCRIPTION - ORIENTATION: ORIENTATION: LOWER

## 2021-03-25 NOTE — PROGRESS NOTES
Physical Therapy    Facility/Department: [facility] [address] [facility]  Initial Assessment/Treatment    NAME: Christina Decker  : 1923  MRN: [id number]    Date of Service: 3/25/2021    Discharge Recommendations:  Christina Decker scored a  on the AM-PAC short mobility form. Current research shows that an AM-PAC score of 17 or less is typically not associated with a discharge to the patient's home setting. Based on the patient's AM-PAC score and their current functional mobility deficits, it is recommended that the patient have 3-5 sessions per week of Physical Therapy at d/c to increase the patient's independence. Please see assessment section for further patient specific details. If patient discharges prior to next session this note will serve as a discharge summary. Please see below for the latest assessment towards goals. Continue to assess pending progress   PT Equipment Recommendations  Equipment Needed: No(Defer)    Assessment   Body structures, Functions, Activity limitations: Decreased functional mobility ; Decreased safe awareness;Decreased cognition;Decreased endurance;Decreased balance; Increased pain;Decreased posture  Assessment: Pt is a 80 y.o women who presented to [facility] with hypoxia and went unresponsive. Pt is demonstrating need for 2 person assist with OOB mobility and min A for all bed mobility. Per notes, per was able to tolerate short distances of gait with LRAD and uses w/c for longer distances. Pt plans to d/c back to SNF with assist from staff for mobility and ADL/IADLs'. Pt is below her functional baseline and would benefit from skilled PT to imrpove independence with functional mobility, LE strength, balance, and endurance.   Treatment Diagnosis: Impaired functional mobility  Prognosis: Good  Decision Making: Medium Complexity  PT Education: Goals;PT Role;Plan of Care;Transfer Training;Functional Mobility Training;Orientation  Patient Education: Pt will need reinforcement  Barriers to Learning: cognition  REQUIRES PT FOLLOW UP: Yes  Activity Tolerance  Activity Tolerance: Patient limited by fatigue;Patient limited by endurance; Patient limited by pain  Activity Tolerance: Pt requires seated rest breaks between attempts at standing and refuses furhter OOB attempts after the 2nd stand attempt. Patient Diagnosis(es): The primary encounter diagnosis was Altered mental status, unspecified altered mental status type. A diagnosis of Hypoxia was also pertinent to this visit. has a past medical history of CAD (coronary artery disease), Hiatal hernia, Hyperlipidemia, Hypertension, and Macular degeneration of both eyes. has a past surgical history that includes Diagnostic Cardiac Cath Lab Procedure; Cholecystectomy; Appendectomy; Tonsillectomy and adenoidectomy; joint replacement; bladder repair (Bladder tuck); bladder suspension (2008); cardiovascular stress test (8/12); and doppler echocardiography (10/13). Restrictions  Position Activity Restriction  Other position/activity restrictions: Up with assist  Vision/Hearing  Vision: Within Functional Limits  Hearing: Within functional limits     Subjective  General  Chart Reviewed: Yes  Patient assessed for rehabilitation services?: Yes  Additional Pertinent Hx: 79 yo F with PMH of Dementia, Afib on Eliquis, HTN, HLD, CAD who presents from the [address] [place] [address].   Response To Previous Treatment: Not applicable  Family / Caregiver Present: No  Referring Practitioner: Lieutenant Isaias MD  Referral Date : 03/24/21  Diagnosis: Pneumonia  Follows Commands: Within Functional Limits  Pain Screening  Patient Currently in Pain: Denies  Pain Assessment  Pain Location: Back  Pain Orientation: Lower     Orientation  Orientation  Overall Orientation Status: Impaired  Orientation Level: Oriented to person(Pt said she was at the hospital hotel, unable to give date or situation)  Social/Functional History  Social/Functional History  Type of Home: Facility(memory care unit)  Additional Comments: Per chart review, pt is from memory care unit where she recieves assistance for all ADLs. Pt can ambulate short distances with walker but primarily uses wheelchair throughout the day. Information obtained via chart review d/t pt being a poor historian given her baseline dimentia. Objective     Observation/Palpation  Posture: Fair(kyhpotic throughout thoracic and lumbar spine)  Observation: 1L of O2    PROM RLE (degrees)  RLE PROM: WFL  AROM RLE (degrees)  RLE AROM: WFL  Strength RLE  Strength RLE: WFL  Comment: grossly 3+ to 4-/5  Strength LLE  Strength LLE: WFL  Comment: grossly 3+ to 4-/5     Sensation  Overall Sensation Status: WFL  Bed mobility  Rolling to Left: Minimal assistance(use of bed rails)  Rolling to Right: Minimal assistance(use of bed rails)  Supine to Sit: Minimal assistance  Sit to Supine: Minimal assistance  Comment: Pt requires mod/max VC's for performing rolling and for hand placement on rail. Pt requires increased time to move supine<>sit and assist at LE's for both transfers. Transfers  Sit to Stand: Dependent/Total;2 Person Assistance(1 failed attempted max A x2, 1 partial stand max A x2 to RW, blocking at feet)  Stand to sit: 2 Person Assistance;Dependent/Total  Comment: Pt attempted transfer from EOB to RW with max A x2 but is limited by kyphotic posture and inability to move hips into extension. Pt required VC's for hand placement, foot placement, and leaning forward. Pt unable to lean forward causing increased difficulty with sit to stand transfer. Pt was at 90-92% SpO2 throughout all mobility  Ambulation  Ambulation?: No     Balance  Sitting - Static: Fair  Sitting - Dynamic: Fair  Standing - Static: Poor;-  Standing - Dynamic: Poor;-  Comments: Pt sitting EOB ~25 with CGA performing ADL's with OT. Pt unable to perform full stand or maintain balance in standing.       Plan   Plan  Times per week: 2-5  Current Treatment Recommendations: Strengthening, Balance Training, Functional Mobility Training, Transfer Training, Endurance Training, Gait Training, Wheelchair Mobility Training, Neuromuscular Re-education  Safety Devices  Type of devices:  All fall risk precautions in place, Bed alarm in place, Call light within reach, Nurse notified, Left in bed    AM-PAC Score  AM-PAC Inpatient Mobility Raw Score : 11 (03/25/21 1353)  AM-PAC Inpatient T-Scale Score : 33.86 (03/25/21 1353)  Mobility Inpatient CMS 0-100% Score: 72.57 (03/25/21 1353)  Mobility Inpatient CMS G-Code Modifier : CL (03/25/21 1353)    Goals  Short term goals  Time Frame for Short term goals: by d/c  Short term goal 1: Pt will perform bed mobility with supervision  Short term goal 2: Pt will perform sit<>stand transfer with max A x1  Short term goal 3: Pt will perform 10' amb with LRAD and mod A x1  Patient Goals   Patient goals : unable to verbalize     Therapy Time   Individual Concurrent Group Co-treatment   Time In 1130         Time Out 1225         Minutes 55         Timed Code Treatment Minutes: 40 Minutes (+15 minute PT eval)     Total Treatment Time: 55 minutes  Buddy Erie, PT

## 2021-03-25 NOTE — PLAN OF CARE
Problem: Daily Care:  Goal: Daily care needs are met  Description: Daily care needs are met  Outcome: Met This Shift     Problem: Infection:  Goal: Will remain free from infection  Description: Will remain free from infection  Outcome: Ongoing  Note: Urine culture and blood culture pending at this time. VS stable at this time will continue to monitor. Problem: Safety:  Goal: Free from accidental physical injury  Description: Free from accidental physical injury  Outcome: Ongoing  Note: No falls at this time, frequent rounding performed and anticipated needs. Safety measures and protocol in place. Problem: Pain:  Goal: Patient's pain/discomfort is manageable  Description: Patient's pain/discomfort is manageable  Outcome: Ongoing  Note: Denies any pain or discomfort at this time.

## 2021-03-25 NOTE — PROGRESS NOTES
Speech Language Pathology  Facility/Department: [facility] [address] [facility]   CLINICAL BEDSIDE SWALLOW EVALUATION/treatment    NAME: Vee Gilman  : 1923  MRN: [id number]    ADMISSION DATE: 3/24/2021  ADMITTING DIAGNOSIS: has Essential hypertension; Macular degeneration of both eyes; CAD (coronary artery disease); Hyperlipidemia; Depressive disorder; Osteoporosis; Late onset Alzheimer's disease without behavioral disturbance ([address] Trigger finger of left hand; NSTEMI (non-ST elevated myocardial infarction) ([address] Nonrheumatic aortic valve insufficiency; Anemia; Nonrheumatic mitral valve regurgitation; Hypoxia; Pulmonary nodule, right; Chronic atrial fibrillation ([address] Hypomagnesemia; Unspecified dementia with behavioral disturbance ([address] Pneumonia; Acute metabolic encephalopathy; and Volume depletion on their problem list.  ONSET DATE: 3/24/21    Recent Chest Xray 3/24/21  Impression       No significant change in moderately large bilateral lower lobe pleural-parenchymal opacities               CT of Chest 3/24/21  Impression   1. No evidence of acute or chronic pulmonary embolus      2. Interval decrease in size of bilateral pleural effusions   3. Bibasilar subpleural atelectasis or pneumonia   4. Stable multiple pulmonary nodules, the largest measuring 10 mm in the right middle lobe, follow-up in one year is recommended         CT head 3/24/21     1.  Atrophy and superimposed mild small vessel ischemic disease, similar to prior study of 3/19/2019 with no evidence of recent infarct, hemorrhage or mass.             Date of Eval: 3/25/2021  Evaluating Therapist: Estephania Chiu    Current Diet level:  Current Diet : Dysphagia Soft and Bite-Sized (Dysphagia III)  Current Liquid Diet : Thin    Primary Complaint  Patient Complaint: none    Pain:  Pain Assessment  Pain Assessment: denies    Reason for Referral  Vee Gilman was referred for a bedside swallow evaluation to assess the efficiency of her swallow function, identify signs and symptoms of aspiration and make recommendations regarding safe dietary consistencies, effective compensatory strategies, and safe eating environment. Chief Complaint:   Per MD notes: \"Altered Mental Status (pt stated she couldn't breathe and then became less responsive, 74% on RA, up to 100% on 4L, given 2mg narcan IV by EMS)  Toby Mccarty is a 81 yo F with PMH of Dementia, Afib on Eliquis, HTN, HLD, CAD who presents from the [address] [place] [address]. Pt has Alzheimer's dementia and is not able to provide history. History was obtained from pt's nurse at the nursing home and pt's POA daughter Angel Barnhart. Per pt's nurse the PCA went to check on the pt this morning and the pt was stating that she was needing air and her SPO2 was 74% on 2L NC. The pt later became unresponsive to chest rub or verbal stimuli. On the way to the hospital the pt was placed on 4L of O2. Pt is not on any oxygen at baseline. EMS. Per pt's nurse pt has severe dementia, is normally only oriented to self and is 'there sometimes and other times she's not.' Pt is unable to do her ADLs and requires assistance with most tasks. She requires assistance using a walker and most times uses a wheelchair. Pt was recently transferred from the independent living to the memory care unit at [facility]. She had a Covid test on Feb 24th that was negative. Spoke with pt's POA, pt's daughter Angel Barnhart, who lives in [place] and she confirmed that pt has advanced dementia. Daughter confirmed that the pt is fully vaccinated. She states that pt normally has low BP. Pt's daughter stated that in case of a cardiac arrest she would not like the pt to be resuscitated. However, in case of respiratory decline she would like the pt to be intubated and in case that the pt required pressure support she would like the pt to be given pressors. At the time of the encounter pt is fully alert and responsive.  She is oriented to self only, but not oriented to age, place (thinks she is in her back yard), year or situation. She does not remember details about the event this morning or details about her medical history. Pt complaining of pain in her left scapular region with tenderness to palpation over the area, but does not have tenderness over the spine. \"       Impression  Dysphagia Diagnosis: Suspected needs further assessment  Dysphagia Impression : Pt alert, sitting up in bed with lunch tray. RN and PT noted pt coughing with liquids earlier today. Pt oriented only to person, followed simple commands and answered basic level questions appropriately. Pt analyzed with items on tray, including chicken noodle soup, pudding, peaches, and liquids via cup and straw. Pt initially consumed liquid with no difficulty. However coughing after liquids emerged with wet voice. Pt also exhibited difficulty with mastication of peaches, eventually expectorating portion of this. This most likely result of missing and poor dentition. Recommend MBS when pt out of covid rule out and downgrading diet to dysphagia II minced and moist/nectar until this can be completed  [facility], see education below  Dysphagia Outcome Severity Scale: Level 4: Mild moderate dysphagia- Intermittent supervision/cueing. One - two diet consistencies restricted     Treatment Plan  Requires SLP Intervention: Yes  Duration/Frequency of Treatment: TBD after MBS  D/C Recommendations:  To be determined     Recommended Diet and Intervention  Diet Solids Recommendation: Dysphagia Minced and Moist (Dysphagia II)  Liquid Consistency Recommendation: Mildly Thick (Nectar)  Recommended Form of Meds: Meds in puree  Recommendations: Modified barium swallow study(when out of covid rule out.)  Therapeutic Interventions: Oral care;Diet tolerance monitoring;Patient/Family education    Compensatory Swallowing Strategies  Upright as possible for all oral intake  Eat/Feed slowly  Check for pocketing of food Small bites/sips    Treatment/Goals  1- The patient will tolerate recommended diet without observed clinical signs of aspiration  2-The patient/caregiver will demonstrate understanding of compensatory strategies for improved swallowing safety. 3/25: Educated pt to purpose of visit, however does not indicate comprehension. Spoke with daughter and son in law via phone conversation. Educated to s/s of aspiration, concern if aspiration occurs, MBS study and rationale.,  Discussed whether family wished MBS be completed, and nutrition for quality of life vs diet modification. After discussion including MBS procedure, possible outcomes, family wishes to proceed with study. Explained this would be completed as long as pt out of covid rule out. Also explained recommendation to downgrade diet until this can be completed. Family stated comprehension, explained will notify them of results if able to be completed. Cont goal    General  Chart Reviewed: Yes  Behavior/Cognition: Alert;Confused  Communication Observation: Functional  Follows Directions: Simple  Dentition: Some missing teeth;Poor dental/oral hygeine  Patient Positioning: Upright in bed  Baseline Vocal Quality: Normal  Volitional Cough: Weak  Prior Dysphagia History: pt has been seen several times by SLP, most recently for BSE 4/2020, with no s/s of aspiration identified. Pt had MBS 3/21/19 with no aspiration identified. Consistencies Administered: Dysphagia Soft and Bite-Sized (Dysphagia III); Dysphagia Pureed (Dysphagia I); Mixed Consistencies; Thin - straw; Thin - cup    Vision/Hearing  Vision  Vision: Within Functional Limits  Hearing  Hearing: Exceptions to [facility]  Hearing Exceptions: Hard of hearing/hearing concerns    Oral Motor Deficits  Oral/Motor  Oral Motor: Within functional limits    Oral Phase Dysfunction  Impaired due to missing and poor dentition.        Indicators of Pharyngeal Phase Dysfunction  Pt analyzed with items on tray, including chicken noodle soup, pudding, peaches, and liquids via cup and straw. Pt initially consumed liquid with no difficulty. However coughing after liquids emerged with wet voice. Prognosis  Prognosis  Prognosis for safe diet advancement: fair  Barriers to reach goals: age;cognitive deficits  Individuals consulted  Consulted and agree with results and recommendations: Patient; Family member  Family member consulted: daughter and son in law    Education  Patient Education: pt and family (via phone conversation) educated to purpose of visit  Patient Education Response: Verbalizes understanding(family)  Safety Devices in place: Yes  Type of devices: Call light within reach       Therapy Time  SLP Individual Minutes  Time In: 0105  Time Out: 0140  Minutes: 28     Plan:  · Recommended diet: downgrade to dysphagia II minced and moist Nectar thick liquids- if any s/s of aspiration emerge, or there is respiratory decline,  make NPO until further evaluated by SLP  · MBS when pt out of covid rule out  Dc recommendation: TBD  Pt therapy goal: not able to state  Pt dc goal: not able to state    Mike Marie M.S./[facility]-SLP #[id number]  Pg.  # [phone]  Needs met prior to leaving room, call light within reach, d/w JIMBO Reyna  This document will serve as a dc summary if pt dc prior to next visit3/25/2021   3:01 PM

## 2021-03-25 NOTE — FLOWSHEET NOTE
03/25/21 1033   Encounter Summary   Services provided to: Family; Patient not available   Referral/Consult From: Palliative Care   Continue Visiting   (3/25jon )   Complexity of Encounter Moderate   Length of Encounter 45 minutes   Advance Care Planning Yes   Routine   Type Initial   Assessment Calm; Approachable; Hopeful   Intervention Active listening;Explored feelings, thoughts, concerns;Nurtured hope   Outcome Comfort;Expressed gratitude;Engaged in conversation;Expressed feelings/needs/concerns;Receptive;Venting emotion; De-escalated   Advance Directives (For Healthcare)   Healthcare Directive Yes, patient has an advance directive for healthcare treatment   Type of Healthcare Directive Durable power of  for health care   Copy in Chart No, copy requested from family   Staff Vivian Garvin, [place]

## 2021-03-25 NOTE — PROGRESS NOTES
Pt's daughter Shar Howard updated via phone. All questions answered at this time.  Electronically signed by Kaur Sanchez RN on 3/25/2021 at 6:38 PM

## 2021-03-25 NOTE — PROGRESS NOTES
Occupational Therapy   Occupational Therapy Initial Assessment/Tx  Date: 3/25/2021   Patient Name: Vee Gilman  MRN: [id number]     : 1923    Date of Service: 3/25/2021    Discharge Recommendations: Vee Gilman scored a 13/24 on the AM-PAC ADL Inpatient form. Current research shows that an AM-PAC score of 17 or less is typically not associated with a discharge to the patient's home setting. Based on the patient's AM-PAC score and their current ADL deficits, it is recommended that the patient have 3-5 sessions per week of Occupational Therapy at d/c to increase the patient's independence. Please see assessment section for further patient specific details. If patient discharges prior to next session this note will serve as a discharge summary. Please see below for the latest assessment towards goals. OT Equipment Recommendations  Equipment Needed: No    Assessment   Performance deficits / Impairments: Decreased functional mobility ; Decreased ADL status; Decreased cognition  Assessment: Pt is from memory care unit with dimentia but at baseline is ambulatory for short distances with 2WW and able to perform wheelchair transfers. Per chart review, pt recieves assistance with ADLS. Pt is disoriented to situation and time this date and below functional baseline requiring min A for bed mobility and max A x 2 for partial sit-stand transfer from EOB. Pt with generalized weakness and decreased activity tolerance wearing 1L of O2 (RA at base). Pt would benefit from ongoing acute OT services and ongoing inpatient therapy services at NH at discharge to return to OF. Will cont to follow on acute OT POC.   Treatment Diagnosis: decreased independence with ADLS and fx transfers  Prognosis: Fair  Decision Making: Medium Complexity  OT Education: OT Role;Plan of Care;Transfer Training  Barriers to Learning: dimentia  REQUIRES OT FOLLOW UP: Yes  Activity Tolerance  Activity Tolerance: Patient Tolerated treatment well  Activity Tolerance: Pt on 1L of O2 with spO2 >90% throughout session  Safety Devices  Safety Devices in place: Yes  Type of devices: All fall risk precautions in place; Bed alarm in place;Call light within reach; Left in bed;Nurse notified           Patient Diagnosis(es): The primary encounter diagnosis was Altered mental status, unspecified altered mental status type. A diagnosis of Hypoxia was also pertinent to this visit. has a past medical history of CAD (coronary artery disease), Hiatal hernia, Hyperlipidemia, Hypertension, and Macular degeneration of both eyes. has a past surgical history that includes Diagnostic Cardiac Cath Lab Procedure; Cholecystectomy; Appendectomy; Tonsillectomy and adenoidectomy; joint replacement; bladder repair (Bladder tuck); bladder suspension (2008); cardiovascular stress test (8/12); and doppler echocardiography (10/13). Treatment Diagnosis: decreased independence with ADLS and fx transfers      Restrictions  Position Activity Restriction  Other position/activity restrictions: Up with assist    Subjective   General  Chart Reviewed: Yes  Patient assessed for rehabilitation services?: Yes  Additional Pertinent Hx: 79 yo F with PMH of Dementia, Afib on Eliquis, HTN, HLD, CAD who presents from the [address] [place] [address] for hypoxia and unresponsive. Pt has Alzheimer's dementia at baseline and recently was transferred to a memory care unit from assisted living facility. Family / Caregiver Present: No  Referring Practitioner: Mildred Bailon MD  Diagnosis: pneumonia  Subjective  Subjective: Pt lying supine in bed and agreeable to OT evaluation. Pt pleasant and alert. Pt waxes and wanes with sensical and nonsensical information.   Pain Assessment  Pain Location: Back  Pain Orientation: Lower  Social/Functional History  Social/Functional History  Type of Home: Facility(memory care unit)  Additional Comments: Per chart review, pt is from memory care unit where she recieves assistance for all ADLs. Pt can ambulate short distances with walker but primarily uses wheelchair throughout the day. Information obtained via chart review d/t pt being a poor historian given her baseline dimentia. Objective   Vision: Within Functional Limits  Hearing: Within functional limits    Orientation  Overall Orientation Status: Impaired  Orientation Level: Oriented to person;Disoriented to situation;Disoriented to time;Disoriented to place  Observation/Palpation  Posture: Fair(kyhpotic throughout thoracic and lumbar spine)  Observation: 1L of O2  Balance  Sitting Balance: Contact guard assistance  Standing Balance: Maximum assistance  Standing Balance  Time: less than 5 seconds for partial stand  Activity: partial stance at edge of bed  Comment: max A x 2, pt with poor posture and hand placement on 2WW despite maximal VC  Functional Mobility  Functional Mobility Comments: unable to perform this date  ADL  Feeding: Unable to assess(comment)(did not observe this date- per RN report, RN had to assist pt with self feeding for breakfast with pt reporting weakness in BUE making it difficult to reach mouth with food, CTA)  Grooming: Minimal assistance;Setup(pt able to wash face and comb hair seated on EOB this date with min A to reach back of head and set up)  UE Bathing: Minimal assistance; Increased time to complete(pt requires increased time but able to wash BUE and required min A to wash back and don deordarnat under arms)  UE Dressing: Minimal assistance  LE Dressing: Dependent/Total(to don/doff socks this date and don/doff briefs)  Toileting: Dependent/Total(purwick in place, pt urinated in briefs, required total A to change)  Tone RUE  RUE Tone: Normotonic  Tone LUE  LUE Tone: Normotonic  Coordination  Movements Are Fluid And Coordinated: Yes  Coordination and Movement description: Decreased speed     Bed mobility  Rolling to Left: Minimal assistance(use of bed rails)  Rolling to Right: Minimal assistance(use of bed rail)  Supine to Sit: Minimal assistance(slow and effortful, VC to progress task)  Sit to Supine: Minimal assistance(MIn A for BLE)  Transfers  Sit to stand: 2 Person assistance;Maximum assistance  Stand to sit: 2 Person assistance;Maximum assistance  Transfer Comments: max A x 2 for first trial and pt able to achieve a partial stance with use of 2WW; second trial pt unable to stand despite max A x 2. Pt denied any additional trials 2/2 fatigue     Cognition  Overall Cognitive Status: Exceptions  Arousal/Alertness: Appropriate responses to stimuli  Following Commands: Follows one step commands with repetition  Attention Span: Appears intact  Memory: Decreased short term memory;Decreased recall of precautions;Decreased recall of recent events  Safety Judgement: Decreased awareness of need for assistance;Decreased awareness of need for safety  Problem Solving: Assistance required to identify errors made  Insights: Decreased awareness of deficits  Initiation: Requires cues for all  Sequencing: Requires cues for all  Cognition Comment: pt has baseline dimentia and requires maximal verbal cues to sequence steps and initiate tasks; pt with poor insight of deficits  Perception  Overall Perceptual Status: WFL     Sensation  Overall Sensation Status: WFL        LUE AROM (degrees)  LUE AROM : WFL  Left Hand AROM (degrees)  Left Hand AROM: WFL  RUE AROM (degrees)  RUE AROM : WFL  Right Hand PROM (degrees)  Right Hand General PROM: fixed contracture on middle finger of R hand - unable to passively be ranged  Right Hand AROM (degrees)  Right Hand AROM: WFL  Right Hand General AROM: fixed contracture on middle finger of R hand - unable to functionally be ranged .  Pt stating, \"It's been like that for 10 yeras, I just don't use it\"  LUE Strength  L Hand General: 4/5  LUE Strength Comment: generalized and age related weakness  RUE Strength  R Hand General: 4/5  RUE Strength Comment: generalized and age related weakness           Treatment included functional transfer training, ADLs, and patient education.            Plan   Plan  Times per week: 2-5x  Specific instructions for Next Treatment: assess feeding    G-Code     OutComes Score                                                  AM-PAC Score        AM-PAC Inpatient Daily Activity Raw Score: 13 (03/25/21 1356)  AM-PAC Inpatient ADL T-Scale Score : 32.03 (03/25/21 1356)  ADL Inpatient CMS 0-100% Score: 63.03 (03/25/21 1356)  ADL Inpatient CMS G-Code Modifier : CL (03/25/21 [facility]6)    Goals  Short term goals  Time Frame for Short term goals: by d/c  Short term goal 1: Pt will perform sit-stand transfer with max A x 1  Short term goal 2: Pt will perform bedside commode transfer with assistance as needed  Short term goal 3: Pt will perform grooming task with setup  Short term goal 4: Pt will perform BUE HEP x 20 to increase UE strength and endurance for ADLs and transfers  Patient Goals   Patient goals : to get back to walking       Therapy Time   Individual Concurrent Group Co-treatment   Time In 1130         Time Out 1230         Minutes 60         Timed Code Treatment Minutes: 45 Minutes(+ 15 min OT eval)       Hodan Houston, OT

## 2021-03-25 NOTE — ACP (ADVANCE CARE PLANNING)
Advance Care Planning     Advance Care Planning Activator (Inpatient)  Conversation Note      Date of ACP Conversation: 3/24/2021    Conversation Conducted with:  Healthcare Decision Maker: Next of Kin by law (only applies in absence of above) (name) Amara Corona (Child)    ACP Activator: [address] Maker:     Current Designated Health Care Decision Maker:     Primary Decision Maker: Amara Corona - Child - [phone]  Click here to complete Healthcare Decision Makers including section of the Healthcare Decision Maker Relationship (ie \"Primary\")  Today we documented Decision Maker(s) consistent with Legal Next of Kin hierarchy. There is a HCPOA and PT's daughter, Ms Xin Syed will be faxing it today. Care Preferences    Ventilation: \"If you were in your present state of health and suddenly became very ill and were unable to breathe on your own, what would your preference be about the use of a ventilator (breathing machine) if it were available to you? \"      Would the patient desire the use of ventilator (breathing machine)?: yes    \"If your health worsens and it becomes clear that your chance of recovery is unlikely, what would your preference be about the use of a ventilator (breathing machine) if it were available to you? \"     Would the patient desire the use of ventilator (breathing machine)?: No      Resuscitation  \"CPR works best to restart the heart when there is a sudden event, like a heart attack, in someone who is otherwise healthy. Unfortunately, CPR does not typically restart the heart for people who have serious health conditions or who are very sick. \"    \"In the event your heart stopped as a result of an underlying serious health condition, would you want attempts to be made to restart your heart (answer \"yes\" for attempt to resuscitate) or would you prefer a natural death (answer \"no\" for do not attempt to resuscitate)? \" NO       [x] Yes   [] No   Educated Patient / Josselin Cordoba regarding differences between Advance Directives and portable DNR orders.     Length of ACP Conversation in minutes:  40    Conversation Outcomes:  [x] ACP discussion completed  [] Existing advance directive reviewed with patient; no changes to patient's previously recorded wishes  [] New Advance Directive completed  [] Portable Do Not Rescitate prepared for Provider review and signature  [] POLST/POST/MOLST/MOST prepared for Provider review and signature      Follow-up plan:    [] Schedule follow-up conversation to continue planning  [] Referred individual to Provider for additional questions/concerns   [] Advised patient/agent/surrogate to review completed ACP document and update if needed with changes in condition, patient preferences or care setting    [x] This note routed to one or more involved healthcare providers

## 2021-03-25 NOTE — PROGRESS NOTES
4 Eyes Admission Assessment     I agree as the admission nurse that 2 RN's have performed a thorough Head to Toe Skin Assessment on the patient. ALL assessment sites listed below have been assessed on admission. Areas assessed by both nurses:   []   Head, Face, and Ears   []   Shoulders, Back, and Chest - redness under L breast  []   Arms, Elbows, and Hands - scattered bruising  []   Coccyx, Sacrum, and Ischium - blanchable redness sacrum  []   Legs, Feet, and Heels        Does the Patient have Skin Breakdown?   No         Herman Prevention initiated:  No   Wound Care Orders initiated:  No      [facility] nurse consulted for Pressure Injury (Stage 3,4, Unstageable, DTI, NWPT, and Complex wounds) or Herman score 18 or lower:  No      Nurse 1 eSignature: Electronically signed by Kristofer Monterroso RN on 3/25/21 at 4:04 AM EDT    **SHARE this note so that the co-signing nurse is able to place an eSignature**    Nurse 2 eSignature: Electronically signed by Damian Brooke RN on 3/25/21 at 5:01 AM EDT

## 2021-03-25 NOTE — PROGRESS NOTES
Progress Note    Admit Date: 3/24/2021  Day: 2  Diet: DIET GENERAL; Dental Soft    CC: SOB and AMS    Interval history:   No acute events reported overnight. She denies any acute complaints at this time. She is alert and oriented to person. Noted that she has severe dementia. Denies SOB, chest pain, N/V. Is tolerating fluids and diet this morning. Also per nursing, she has been coughing intermittently while drinking thin liquids this AM.    Medications:     Scheduled Meds:   apixaban  2.5 mg Oral BID    aspirin  81 mg Oral Daily    donepezil  10 mg Oral Nightly    atorvastatin  10 mg Oral Nightly    sodium chloride flush  10 mL Intravenous 2 times per day     Continuous Infusions:   sodium chloride 50 mL/hr at 03/24/21 2125     PRN Meds:sodium chloride flush, promethazine **OR** ondansetron, polyethylene glycol, acetaminophen **OR** acetaminophen    Objective:   Vitals:   T-max:  Patient Vitals for the past 8 hrs:   BP Temp Temp src Pulse Resp SpO2   03/25/21 0501 (!) 110/55 95.5 °F (35.3 °C) Oral 68 22 94 %       Intake/Output Summary (Last 24 hours) at 3/25/2021 7302  Last data filed at 3/25/2021 0501  Gross per 24 hour   Intake 60 ml   Output 200 ml   Net -140 ml       Review of Systems:  Unable to obtain due to severe dementia     Physical Exam:  General appearance:  No apparent distress, appears stated age and cooperative. HEENT:  Normal cephalic,atraumatic without obvious deformity. Pupils equal, round, and reactive to light. Extra ocular muscles intact. Conjunctivae/corneas clear. Neck: Supple, with full range of motion. No jugular venous distention. Trachea midline. Respiratory:  Normal respiratory effort. Clear to auscultation, bilaterally without Rales/Wheezes/Rhonchi. Cardiovascular: 2/6 RC over aortic valve and LLSB/ apex areas Regular rate and rhythm. Abdomen: Soft, non-tender, non-distended with normal bowel sounds. Musculoskeletal: Tenderness to palpation over left scapula.  No clubbing, cyanosis oredema bilaterally. Full range of motion without deformity. Skin: Skin color, texture, turgor normal.  Norashes or lesions. Neurologic:  Neurovascularly intact without any focal sensory/motor deficits. Cranialnerves: II-XII intact, grossly non-focal.  Psychiatric:  Alert. Oriented to self only. Severe dementia at baseline. Capillary Refill: Brisk,< 3 seconds   Peripheral Pulses: +2 palpable, equal bilaterally     LABS:    CBC:   Recent Labs     03/24/21  1117 03/25/21  0432   WBC 3.5* 3.4*   HGB 9.0* 9.1*   HCT 27.4* 27.0*    205   MCV 94.4 93.4     Renal:    Recent Labs     03/24/21  1117 03/25/21  0432    142   K 4.4 3.9    105   CO2 31 29   BUN 21* 19   CREATININE 0.9 0.8   GLUCOSE 86 70   CALCIUM 8.7 8.5   ANIONGAP 7 8     Hepatic: No results for input(s): AST, ALT, BILITOT, BILIDIR, PROT, LABALBU, ALKPHOS in the last 72 hours. Troponin:   Recent Labs     03/24/21  1117   TROPONINI <0.01     -----------------------------------------------------------------  RAD:   CT CHEST PULMONARY EMBOLISM W CONTRAST   Final Result   1. No evidence of acute or chronic pulmonary embolus      2. Interval decrease in size of bilateral pleural effusions   3. Bibasilar subpleural atelectasis or pneumonia   4. Stable multiple pulmonary nodules, the largest measuring 10 mm in the right middle lobe, follow-up in one year is recommended         CT HEAD WO CONTRAST   Final Result      1. Atrophy and superimposed mild small vessel ischemic disease, similar to prior study of 3/19/2019 with no evidence of recent infarct, hemorrhage or mass.         XR CHEST PORTABLE   Final Result      No significant change in moderately large bilateral lower lobe pleural-parenchymal opacities                Assessment/Plan:   Marisela Plasencia is a 80 y.o. female who presents w/SOB     Hypoxia secondary to bibasilar aspiration pneumonitis- SPO2 was found to be 74% earlier this morning; requiring 4L O2 on the way to the hospital. Pt has advanced Alzheimer's dementia. Is alert and oriented to self only during encounter, which seems to be at baseline  - consider aspiration, central apnea as causes of pt's hypoxic episode. likely atelectasis vs PNA vs COVID  - Chest x-ray revealed no significant change in moderately large bilateral lower lobe pleural parenchymal opacities. - CTPA revealed no evidence of PE.  It did reveal bibasilar subpleural atelectasis or pneumonia. - procal 0.17  - now on 1 L NC  - rapid flu negative  - pt coughing on thin liquids this AM; aspiration and dysphagia may have contributed to pt's presentation.   Plan  - SLP and likely MBS  - gentle fluids hydration, continue for at least one more day  - incentive spirometry  - hold off on antibiotics at this time   - wean off spO2 as able  - f/u COVID-19 test.   - f/u blood cultures  - f/u respiratory viral panel, Legionella antigen, Strep antigen  - will clarify code status; DNR-CCA may be more appropriate     Volume depletion likely 2/2 chronic malnutrition  - in setting of chronic hypotension  - IVF gentle rate  - SLP eval and likely MBS    Heart murmur  -2/6 RC over aortic valve and LLSB/ apex areas  -last Echo 4/2020 showed EF 55-60%, mild aortic valve stenosis, thickened calcific mitral valve leaflets with reduced leaflet excursion, mitral annular calcification  -continue to monitor    Chronic hypotension - per POA daughter Berny Argueta pt has low BP normally  - monitor BP  - monitor I/Os  - IVF gentle rate     Alzheimer dementia  - continue donepezil  - consider restarting risperidone     Afib   - continue Eliquis     HTN   - hold antihypertensives     HLD  - continue lipitor     CAD  - continue ASA        Code Status: Limited  FEN: DIET GENERAL; Dental Soft  PPX: eliquis  DISPO: GMF    Radha Sanchez MD, PGY-1  03/25/21  8:42 AM    This patient has been staffed and discussed with Lola Judge MD.     Addendum to Resident H& P/Progress note:  I have personally seen,examined and evaluated the patient.  I have reviewed the current history, physical findings, labs and assessment and plan and agree with note as documented by resident MD ( Hung Kahn)      Pattie Roque MD, [address]

## 2021-03-25 NOTE — PLAN OF CARE
Problem: Pain:  Goal: Patient's pain/discomfort is manageable  Description: Patient's pain/discomfort is manageable  3/25/2021 0931 by Kaur Sanchez RN  Note: Pt denies pain at this time. Will continue to monitor. Problem: Falls - Risk of:  Goal: Will remain free from falls  Description: Will remain free from falls  Note: Pt is a High Fall Risk. See Charlie Rummer Fall Risk Score. Pt bed in low position, bed wheels locked, non-skid socks in use, bed alarm on, and 2/4 side rails up. Call light and belongings left within reach and pt encouraged to call for assistance. Will continue with hourly rounds for PO intake, pain needs, toileting, and repositioning as needed. No needs expressed at this time. Problem: Gas Exchange - Impaired  Goal: Absence of hypoxia  Note: SpO2 level 94% on 1L via nasal cannula. Pt was on RA, but placed back on 1L by RN due to SpO2 level=85% while sleeping. Lungs are clear/diminished to auscultation. Pt denies SOB at this time. Droplet plus isolation maintained pending Covid-19 result. Medications given as scheduled. Will continue to monitor oxygenation/respiratory pattern and will report changes in pt condition to physician.

## 2021-03-25 NOTE — CARE COORDINATION
Case Management Assessment           Initial Evaluation                Date / Time of Evaluation: 3/25/2021 1:43 PM                 Assessment Completed by: Shanika Jay     Patient is from [facility] and stays in the Memory Care unit and is able to return there at d/c. She will need transport to return. I spoke with the patient's daughter Dayna Oquendo and the patient was ambulating with a walker until about 2 weeks ago and needed to be pushed in the wheelchair. I left a message for Geovany Hutchison the DON at [facility]. Geovany Hutchison called back and as long as the patient is able to stand and pivot and can feed herself she can return. She has aide services daily for about 8 hours and receives assistance with all ADL's at [facility]. Patient Name: Marisela Plasencia     YOB: 1923  Diagnosis: Pneumonia [J18.9]     Date / Time: 3/24/2021  9:53 AM    Patient Admission Status: Inpatient    If patient is discharged prior to next notation, then this note serves as note for discharge by case management. Current PCP: Tyrone Elizondo MD  Clinic Patient: No    Chart Reviewed: Yes  Patient/ Family Interviewed: Yes    Initial assessment completed at bedside with: spoke with Dayna Oquendo by phone    Hospitalization in the last 30 days: No    Emergency Contacts:  Extended Emergency Contact Information  Primary Emergency Contact: [address] Phone: [phone]  Mobile Phone: [phone]  Relation: Child  Secondary Emergency Contact: amezquita, laina  Home Phone: [phone]  Mobile Phone: [phone]  Relation: Other   needed?  No    Advance Directives:   Code Status: Limited    Healthcare Power of : No  Agent: NA  Contact Number: NA        Financial  Payor: MEDICARE / Plan: MEDICARE PART A AND B / Product Type: *No Product type* /     Pre-cert required for SNF: No    Pharmacy    [address] [address] [address] - F [phone]  [address]  [facility] [address]  Phone: [phone] Fax: [phone]    [id number] - [place], [address] - P [phone] - F [phone]  [address] [facility] [address]  Phone: [phone] Fax: [phone]      Potential assistance Purchasing Medications: Potential Assistance Purchasing Medications: No  Does Patient want to participate in local refill/ meds to beds program?: No    Meds To Beds General Rules:  1. Can ONLY be done Monday- Friday between 8:30am-5pm  2. Prescription(s) must be in pharmacy by 3pm to be filled same day  3. Copy of patient's insurance/ prescription drug card and patient face sheet must be sent along with the prescription(s)  4. Cost of Rx cannot be added to hospital bill. If financial assistance is needed, please contact unit  or ;  or  CANNOT provide pharmacy voucher for patients co-pays  5. Patients can then  the prescription on their way out of the hospital at discharge, or pharmacy can deliver to the bedside if staff is available. (payment due at time of pick-up or delivery - cash, check, or card accepted)     Able to afford home medications/ co-pay costs: Yes    ADLS  Support Systems: Children    PT AM-PAC:   /24  OT AM-PAC:   /24    [place]: memory care unit  Steps: none    Plans to RETURN to current housing: Yes  Barriers to RETURNING to current housing: none noted      Durable Medical Equipment  DME Provider:  Equipment: wheelchair and walker    DISCHARGE PLAN:  Disposition: [address]:  [facility] Phone: [phone] Fax: NA    Transportation PLAN for discharge: EMS transportation     Factors facilitating achievement of predicted outcomes: Family support and Caregiver support    Barriers to discharge: covid r/o, 1L O2    Additional Case Management Notes: NA    The Plan for Transition of Care is related to the following treatment goals of Pneumonia [J18.9]    The Patient and/or patient representative Lizzie and her family were provided with a choice of provider and agrees with the discharge plan Yes    Freedom of choice list was provided with basic dialogue that supports the patient's individualized plan of care/goals and shares the quality data associated with the providers.  Yes    Care Transition patient: No    Iker Vargas RN  [facility]  Case Management Department  Ph: [phone]   Fax: [phone]

## 2021-03-26 ENCOUNTER — APPOINTMENT (OUTPATIENT)
Dept: GENERAL RADIOLOGY | Age: 86
DRG: 177 | End: 2021-03-26
Payer: MEDICARE

## 2021-03-26 VITALS
DIASTOLIC BLOOD PRESSURE: 65 MMHG | TEMPERATURE: 95.4 F | WEIGHT: 126 LBS | OXYGEN SATURATION: 99 % | SYSTOLIC BLOOD PRESSURE: 133 MMHG | HEIGHT: 63 IN | RESPIRATION RATE: 18 BRPM | BODY MASS INDEX: 22.32 KG/M2 | HEART RATE: 64 BPM

## 2021-03-26 LAB
ANION GAP SERPL CALCULATED.3IONS-SCNC: 7 MMOL/L (ref 3–16)
BASOPHILS ABSOLUTE: 0 K/UL (ref 0–0.2)
BASOPHILS RELATIVE PERCENT: 0.4 %
BUN BLDV-MCNC: 14 MG/DL (ref 7–20)
CALCIUM SERPL-MCNC: 8.2 MG/DL (ref 8.3–10.6)
CHLORIDE BLD-SCNC: 107 MMOL/L (ref 99–110)
CO2: 28 MMOL/L (ref 21–32)
CREAT SERPL-MCNC: 0.7 MG/DL (ref 0.6–1.2)
EOSINOPHILS ABSOLUTE: 0.1 K/UL (ref 0–0.6)
EOSINOPHILS RELATIVE PERCENT: 1.9 %
GFR AFRICAN AMERICAN: >60
GFR NON-AFRICAN AMERICAN: >60
GLUCOSE BLD-MCNC: 88 MG/DL (ref 70–99)
HCT VFR BLD CALC: 27.9 % (ref 36–48)
HEMOGLOBIN: 9.3 G/DL (ref 12–16)
L. PNEUMOPHILA SEROGP 1 UR AG: NORMAL
LYMPHOCYTES ABSOLUTE: 0.3 K/UL (ref 1–5.1)
LYMPHOCYTES RELATIVE PERCENT: 8 %
MCH RBC QN AUTO: 31.4 PG (ref 26–34)
MCHC RBC AUTO-ENTMCNC: 33.4 G/DL (ref 31–36)
MCV RBC AUTO: 94 FL (ref 80–100)
MONOCYTES ABSOLUTE: 0.5 K/UL (ref 0–1.3)
MONOCYTES RELATIVE PERCENT: 10.7 %
NEUTROPHILS ABSOLUTE: 3.4 K/UL (ref 1.7–7.7)
NEUTROPHILS RELATIVE PERCENT: 79 %
PDW BLD-RTO: 15.9 % (ref 12.4–15.4)
PLATELET # BLD: 224 K/UL (ref 135–450)
PMV BLD AUTO: 8.4 FL (ref 5–10.5)
POTASSIUM REFLEX MAGNESIUM: 3.9 MMOL/L (ref 3.5–5.1)
RBC # BLD: 2.96 M/UL (ref 4–5.2)
SODIUM BLD-SCNC: 142 MMOL/L (ref 136–145)
STREP PNEUMONIAE ANTIGEN, URINE: NORMAL
WBC # BLD: 4.3 K/UL (ref 4–11)

## 2021-03-26 PROCEDURE — 97530 THERAPEUTIC ACTIVITIES: CPT

## 2021-03-26 PROCEDURE — 99239 HOSP IP/OBS DSCHRG MGMT >30: CPT | Performed by: HOSPITALIST

## 2021-03-26 PROCEDURE — 6370000000 HC RX 637 (ALT 250 FOR IP): Performed by: STUDENT IN AN ORGANIZED HEALTH CARE EDUCATION/TRAINING PROGRAM

## 2021-03-26 PROCEDURE — 85025 COMPLETE CBC W/AUTO DIFF WBC: CPT

## 2021-03-26 PROCEDURE — 80048 BASIC METABOLIC PNL TOTAL CA: CPT

## 2021-03-26 PROCEDURE — 97110 THERAPEUTIC EXERCISES: CPT

## 2021-03-26 PROCEDURE — 36415 COLL VENOUS BLD VENIPUNCTURE: CPT

## 2021-03-26 PROCEDURE — 2580000003 HC RX 258: Performed by: STUDENT IN AN ORGANIZED HEALTH CARE EDUCATION/TRAINING PROGRAM

## 2021-03-26 PROCEDURE — 97535 SELF CARE MNGMENT TRAINING: CPT

## 2021-03-26 PROCEDURE — 92611 MOTION FLUOROSCOPY/SWALLOW: CPT

## 2021-03-26 PROCEDURE — 74230 X-RAY XM SWLNG FUNCJ C+: CPT

## 2021-03-26 RX ADMIN — APIXABAN 2.5 MG: 2.5 TABLET, FILM COATED ORAL at 08:32

## 2021-03-26 RX ADMIN — SODIUM CHLORIDE: 9 INJECTION, SOLUTION INTRAVENOUS at 11:32

## 2021-03-26 RX ADMIN — ASPIRIN 81 MG: 81 TABLET, COATED ORAL at 08:32

## 2021-03-26 ASSESSMENT — PAIN SCALES - GENERAL: PAINLEVEL_OUTOF10: 0

## 2021-03-26 NOTE — PROGRESS NOTES
Report called to 6S RN. Pt transferring to 6301. Belongings collected and at bedside prior to transfer.     Electronically signed by Ricardo Alfaro RN on 9/53/3210 at 3:00 AM

## 2021-03-26 NOTE — PROGRESS NOTES
4 Eyes Admission Assessment     I agree as the admission nurse that 2 RN's have performed a thorough Head to Toe Skin Assessment on the patient. ALL assessment sites listed below have been assessed on admission. Areas assessed by both nurses:   [x]   Head, Face, and Ears   [x]   Shoulders, Back, and Chest  [x]   Arms, Elbows, and Hands   [x]   Coccyx, Sacrum, and Ischium  [x]   Legs, Feet, and Heels         Does the Patient have Skin Breakdown? No   Pt has excoriation under L breast, scattered bruises and abrasions, blanchable redness to coccyx.          Herman Prevention initiated:  Yes   Wound Care Orders initiated:  No      WOC nurse consulted for Pressure Injury (Stage 3,4, Unstageable, DTI, NWPT, and Complex wounds) or Herman score 18 or lower:  No      Nurse 1 eSignature: Electronically signed by Conner San RN on 3/26/21 at 5:12 AM EDT    **SHARE this note so that the co-signing nurse is able to place an eSignature**    Nurse 2 eSignature: Electronically signed by Kyle Dunlap RN on 3/26/21 at 5:42 AM EDT

## 2021-03-26 NOTE — PROCEDURES
INSTRUMENTAL SWALLOW REPORT  MODIFIED BARIUM SWALLOW/DC    NAME: Keith Paez   : 1923  MRN: [id number]       Date of Eval: 3/26/2021     Ordering Physician: Dr. Milton Barrera  Radiologist: Dr. Aiyana Brown     Referring Diagnosis(es): Referring Diagnosis: pneumonia    Past Medical History:  has a past medical history of CAD (coronary artery disease), Hiatal hernia, Hyperlipidemia, Hypertension, and Macular degeneration of both eyes. Past Surgical History:  has a past surgical history that includes Diagnostic Cardiac Cath Lab Procedure; Cholecystectomy; Appendectomy; Tonsillectomy and adenoidectomy; joint replacement; bladder repair (Bladder tuck); bladder suspension (); cardiovascular stress test (); and doppler echocardiography (10/13). Current Diet Solid Consistency: Dysphagia Minced and Moist (Dysphagia II)  Current Diet Liquid Consistency: Mildly Thick (Nectar)       Type of Study: Repeat MBS      Recent Chest Xray 3/24/21  Impression       No significant change in moderately large bilateral lower lobe pleural-parenchymal opacities                CT of Chest 3/24/21  Impression   1. No evidence of acute or chronic pulmonary embolus      2. Interval decrease in size of bilateral pleural effusions   3. Bibasilar subpleural atelectasis or pneumonia   4. Stable multiple pulmonary nodules, the largest measuring 10 mm in the right middle lobe, follow-up in one year is recommended          CT head 3/24/21      1. Atrophy and superimposed mild small vessel ischemic disease, similar to prior study of 3/19/2019 with no evidence of recent infarct, hemorrhage or mass.            Patient Complaints/Reason for Referral:  Keith Paez was referred for a MBS to assess the efficiency of his/her swallow function, assess for aspiration, and to make recommendations regarding safe dietary consistencies, effective compensatory strategies, and safe eating environment.      Onset of problem:   Date of Onset: 3/24/21    Behavior/Cognition/Vision/Hearing:  Behavior/Cognition: Alert; Cooperative  Vision: Within Functional Limits  Hearing: Exceptions to [facility]  Hearing Exceptions: Hard of hearing/hearing concerns    Impressions:  Swallow WFL's. Swallow is timely and efficient. No penetration/aspiration or pharyngeal residue identified. This included large volume, uninterrupted swallows via cup and straw. Mastication of solids is prolonged, clears oral cavity. Recommend dysphagia II minced and moist (missing and poor dentition)/ thin liquids  Treatment Dx and ICD 10: dysphagia   Patient Position: Lateral and Patient Degrees: 90    Consistencies Administered: Reg solid; Thin cup; Thin teaspoon;Dysphagia Pureed (Dysphagia I); Thin straw     Dysphagia Outcome Severity Scale: Level 6: Within functional limits/Modified independence  Penetration-Aspiration Scale (PAS): 1 - Material does not enter the airway    Recommended Diet:  Solid consistency: Dysphagia Minced and Moist (Dysphagia II)  Liquid consistency:  Thin  Liquid administration via: Straw;Cup  Medication administration: Meds in puree    Safe Swallow Protocol:  Supervision: Distant  Upright as possible for all oral intake  Check for pocketing of food     Recommendations/Treatment  Requires SLP Intervention: Yes  D/C Recommendations: (no follow up indicated)     Recommended Exercises: n/a   Therapeutic Interventions: Patient/Family education    Education: Images and recommendations were reviewed with pt following this exam.   Patient Education: pt /family educated to results of MBS  Patient Education Response: No evidence of learning(pt)    Prognosis  Prognosis for safe diet advancement: good  Duration/Frequency of Treatment  Duration/Frequency of Treatment: no follow up indicated      Goals:    1- The patient will tolerate recommended diet without observed clinical signs of aspiration  3/26: goal dc s/p MBS results  2-The patient/caregiver will demonstrate understanding of

## 2021-03-26 NOTE — DISCHARGE INSTR - COC
Continuity of Care Form    Patient Name: Tiki Benito   :  1923  MRN:  [id number]    Admit date:  3/24/2021  Discharge date:  ***    Code Status Order: DNR-CCA   Advance Directives:   Advance Care Flowsheet Documentation       Date/Time Healthcare Directive Type of Healthcare Directive Copy in [address] Agent's Name Healthcare Agent's Phone Number    21 1033  Yes, patient has an advance directive for healthcare treatment  Durable power of  for health care  No, copy requested from family  See Puri    21 2240  Yes, patient has an advance directive for healthcare treatment  Durable power of  for health care  [address] [place]            Admitting Physician:  Chino Funes MD  PCP: Makayla Hernández MD    Discharging Nurse: [facility] Unit/Room#: 5386/2872-71  Discharging Unit Phone Number: [phone]    Emergency Contact:   Extended Emergency Contact Information  Primary Emergency Contact: [address] Phone: [phone]  Mobile Phone: [phone]  Relation: Child  Secondary Emergency Contact: laina amezquita  [place] Phone: [phone]  Mobile Phone: [phone]  Relation: Other   needed?  No    Past Surgical History:  Past Surgical History:   Procedure Laterality Date    APPENDECTOMY      BLADDER REPAIR  Bladder tuck    Bladder tuck    BLADDER SUSPENSION      Good [place]    CARDIOVASCULAR STRESS TEST      negative - good EF    CHOLECYSTECTOMY      DIAGNOSTIC CARDIAC CATH LAB PROCEDURE      DOPPLER ECHOCARDIOGRAPHY  10/13    normal EF    JOINT REPLACEMENT      TONSILLECTOMY AND ADENOIDECTOMY         Immunization History:   Immunization History   Administered Date(s) Administered    COVID-19, Pfizer, PF, 30mcg/0.3mL 2021, 2021    Influenza Vaccine, unspecified formulation 10/11/2012, 10/15/2015    Influenza Virus Vaccine 2012, 2013, 10/15/2014    Influenza, High Dose (Fluzone 65 yrs and older) 11/20/2013, 10/15/2014, 09/17/2018, 09/24/2019    Influenza, Garcia Staple, adjuvanted, 65 yrs +, IM, PF (Fluad) 10/05/2020    Pneumococcal Polysaccharide (Fisesgwag19) 10/05/2020    Tetanus Toxoid, absorbed 01/01/2009    Varicella (Varivax) 01/01/2009       Active Problems:  Patient Active Problem List   Diagnosis Code    Essential hypertension I10    Macular degeneration of both eyes H35.30    CAD (coronary artery disease) I25.10    Hyperlipidemia E78.5    Depressive disorder F32.9    Osteoporosis M81.0    Late onset Alzheimer's disease without behavioral disturbance (HCC) G30.1, F02.80    Trigger finger of left hand M65.30    NSTEMI (non-ST elevated myocardial infarction) ([facility] [address] I21.4    Nonrheumatic aortic valve insufficiency I35.1    Anemia D64.9    Nonrheumatic mitral valve regurgitation I34.0    Hypoxia R09.02    Pulmonary nodule, right R91.1    Chronic atrial fibrillation (HCC) I48.20    Hypomagnesemia E83.42    Unspecified dementia with behavioral disturbance (HCC) F03.91    Pneumonia J18.9    Acute metabolic encephalopathy C30.65    Volume depletion E86.9       Isolation/Infection:   Isolation            Droplet          Patient Infection Status       Infection Onset Added Last Indicated Last Indicated By Review Planned Expiration Resolved Resolved By    None active    Resolved    COVID-19 Rule Out 03/24/21 03/24/21 03/24/21 COVID-19 (Ordered)   03/25/21 Rule-Out Test Resulted            Nurse Assessment:  Last Vital Signs: /67   Pulse 59   Temp 95.4 °F (35.2 °C) (Axillary)   Resp 18   Ht 5' 3\" (1.6 m)   Wt 126 lb (57.2 kg)   SpO2 95%   BMI 22.32 kg/m²     Last documented pain score (0-10 scale): Pain Level: 0  Last Weight:   Wt Readings from Last 1 Encounters:   03/24/21 126 lb (57.2 kg)     Mental Status:  alert    IV Access:  - None    Nursing Mobility/ADLs:  Walking   Assisted  Transfer  Assisted  Bathing  assisted  Dressing assisted  Toileting  Dependent  Feeding  Assisted  Med Admin  Dependent  Med Delivery   crushed    Wound Care Documentation and Therapy:        Elimination:  Continence:   · Bowel: No  · Bladder: No  Urinary Catheter: None   Colostomy/Ileostomy/Ileal Conduit: No       Date of Last BM: ***    Intake/Output Summary (Last 24 hours) at 3/26/2021 1023  Last data filed at 3/26/2021 0839  Gross per 24 hour   Intake 781 ml   Output 300 ml   Net 481 ml     I/O last 3 completed shifts: In: 435 [P.O.:720; I.V.:241]  Out: 300 [Urine:300]    Safety Concerns: At Risk for Falls    Impairments/Disabilities:      None    Nutrition Therapy:  Current Nutrition Therapy:   - Oral Diet:  Dysphagia 2 mechanically altered    Routes of Feeding: Oral  Liquids: Thin Liquids  Daily Fluid Restriction: no  Last Modified Barium Swallow with Video (Video Swallowing Test): done on 3/26    Treatments at the Time of Hospital Discharge:   Respiratory Treatments: ***  Oxygen Therapy:  is on oxygen at 1 L/min per nasal cannula. Ventilator:    - No ventilator support    Rehab Therapies: ***  Weight Bearing Status/Restrictions: No weight bearing restirctions  Other Medical Equipment (for information only, NOT a DME order):  wheelchair and hospital bed  Other Treatments: ***    Patient's personal belongings (please select all that are sent with patient):  None    RN SIGNATURE:  Electronically signed by Cindy Murphy RN on 3/26/21 at 4:43 PM EDT    CASE MANAGEMENT/SOCIAL WORK SECTION    Inpatient Status Date: 3/24/2021    Readmission Risk Assessment Score:  Readmission Risk              Risk of Unplanned Readmission:        20           Discharging to Facility/ Agency     Patient is from [facility] and stays in the [address] [facility] unit and is able to return there at d/c.  She will need transport to return    [facility] [address]   [facility], [address] [facility]  [phone]      Dialysis Facility (if applicable)   · Name:  · Address:  · Dialysis Schedule:  · Phone:  · Fax:    / signature: Electronically signed by Gilda Acharya RN on 3/26/21 at 12:48 PM EDT    PHYSICIAN SECTION    Prognosis: Good    Condition at Discharge: Stable    Rehab Potential (if transferring to Rehab): Good    Recommended Labs or Other Treatments After Discharge:     -Will need 1-2 L home O2  -Follow up with Dr. Martin Rojas in 1 week. Please discuss with him heart murmur (aortic stenosis) noticed while you were in hospital  -Continue same diet upon return to memory care unit: dysphagia II minced and moist (missing and poor dentition)/ thin liquids  -home PT/OT/SLP  -Vitals monitoring including SpO2  -Stop taking Amlodipine  -Take metoprolol as prescribed, but hold if HR<60 and/or SBP <90  -Take lasix as prescribed, but hold if SBP <88    Physician Certification: I certify the above information and transfer of Billie Dias  is necessary for the continuing treatment of the diagnosis listed and that she requires [facility] for greater 30 days.      Update Admission H&P: No change in H&P    PHYSICIAN SIGNATURE:  Electronically signed by Ya Suero MD and Miguelina Parker MD on 3/26/21 at 10:25 AM EDT

## 2021-03-26 NOTE — PROGRESS NOTES
Physical Therapy  Daily Treatment Note    Discharge Recommendations: Elvira Wiley scored a 10/24 on the AM-PAC short mobility form. Current research shows that an AM-PAC score of 17 or less is typically not associated with a discharge to the patient's home setting. Based on the patient's AM-PAC score and their current functional mobility deficits, it is recommended that the patient have 3-5 sessions per week of Physical Therapy at d/c to increase the patient's independence. Please see assessment section for further patient specific details. Assessment:  Pt with good participation after some encouragement. Needing up to assist x 2 for safe mobility with walker. Pt would benefit from continued PT at D/C. Plan is for return to long term facility. Equipment Needs: Defer to next level of care    Chart Reviewed: Yes     Other position/activity restrictions: Up with assist   Additional Pertinent Hx: 79 yo F with PMH of Dementia, Afib on Eliquis, HTN, HLD, CAD who presents from the [address] [place] [address]. Diagnosis: Pneumonia   Treatment Diagnosis: Impaired functional mobility    Subjective: Pt in bed initially. Agreeable to getting up after min encouragement. \"I could stay here instead. \" (re: bed). Confusion noted at times per conversation. Pain: c/o side/back discomfort \"sore\". Not rated. Objective:    Exercises  10 reps B LE exercises in bed: ankle pumps, hip adductor squeezes, heel slides, hip abd/add, SAQ. Needing verbal and occasional tactile cues for instruction/quality of exercises. Bed mobility  Supine to sit: Mod assist x 1, HOB up partially with use of rail  Scooting:  Mod assist to EOB    Transfers  Sit to stand: Dependent (Mod assist x 2) from bed (twice)  Stand to sit: Mod assist x 1 onto bed/into chair (for controlled descent)  Bed > chair: Dependent (Mod assist x 1 + Min assist x 1) with walker    Ambulation  Assistance Level: Dependent (Mod assist x 1 + Min assist x 1) Assistive device: Wheeled walker  Distance: 2 ft  Quality of gait: Narrow MERI; decreased step length; effortful; unsteady    Balance  Sat EOB ~12 minutes with Min assist overall for balance    Patient Education  Role of PT. Importance of mobility. Expressed understanding. Safety Devices  Pt left with needs in reach. In chair with chair alarm on. RN updated. AM-PAC score  AM-PAC Inpatient Mobility Raw Score : 10  AM-PAC Inpatient T-Scale Score : 32.29  Mobility Inpatient CMS 0-100% Score: 76.75  Mobility Inpatient CMS G-Code Modifier : CL    Goals: (as determined and assessed by primary PT)  Time Frame for Short term goals: by d/c  Short term goal 1: Pt will perform bed mobility with supervision   Short term goal 2: Pt will perform sit<>stand transfer with max A x1   Short term goal 3: Pt will perform 10' amb with LRAD and mod A x1      Plan:  Times per week: 2-5;    Current Treatment Recommendations: Strengthening, Balance Training, Functional Mobility Training, Transfer Training, Endurance Training, Gait Training, Wheelchair Mobility Training, Neuromuscular Re-education    Therapy Time    Individual  Concurrent  Group  Co-treatment    Time In  1152            Time Out  1222            Minutes  30              Timed Code Treatment Minutes: 30  Total Treatment Minutes: 30    Will continue per plan of care. If patient is discharged prior to next treatment, this note will serve as the discharge summary.     Gildardo Wick #[id number]

## 2021-03-26 NOTE — PROGRESS NOTES
Progress Note    Admit Date: 3/24/2021  Day: 3  Diet: DIET DYSPHAGIA MINCED AND MOIST; Mildly Thick (Nectar)    CC: SOB and AMS    Interval history:   No acute events reported overnight. She is alert and oriented to person. Unable to obtain full interval history due to severe dementia  Denies shortness of breath but states that her \"diaphragm hurts\". She is currently saturating well in the 90s on 2L of oxygen nasal cannula. Medications:     Scheduled Meds:   apixaban  2.5 mg Oral BID    aspirin  81 mg Oral Daily    donepezil  10 mg Oral Nightly    atorvastatin  10 mg Oral Nightly    sodium chloride flush  10 mL Intravenous 2 times per day     Continuous Infusions:   sodium chloride 50 mL/hr at 03/25/21 1636     PRN Meds:sodium chloride flush, promethazine **OR** ondansetron, polyethylene glycol, acetaminophen **OR** acetaminophen    Objective:   Vitals:   T-max:  Patient Vitals for the past 8 hrs:   BP Temp Temp src Pulse Resp SpO2   03/26/21 0816 129/67 95.4 °F (35.2 °C) Axillary 59 18 95 %   03/26/21 0331 112/69 97 °F (36.1 °C) Oral 70 18 97 %   03/26/21 0201    74         Intake/Output Summary (Last 24 hours) at 3/26/2021 0916  Last data filed at 3/26/2021 0308  Gross per 24 hour   Intake 1021 ml   Output 300 ml   Net 721 ml       Review of Systems:  Unable to obtain due to severe dementia     Physical Exam:  General appearance:  No apparent distress, appears stated age and cooperative. HEENT:  Normal cephalic,atraumatic without obvious deformity. Pupils equal, round, and reactive to light. Extra ocular muscles intact. Conjunctivae/corneas clear. Neck: Supple, with full range of motion. No jugular venous distention. Trachea midline. Respiratory:  Normal respiratory effort. Clear to auscultation, bilaterally without Rales/Wheezes/Rhonchi. Cardiovascular: 2/6 RC over aortic valve and LLSB/ apex areas Regular rate and rhythm. Abdomen: Soft, non-tender, non-distended with normal bowel sounds. Musculoskeletal: Tenderness to palpation over left scapula. No clubbing, cyanosis oredema bilaterally. Full range of motion without deformity. Skin: Skin color, texture, turgor normal.  Norashes or lesions. Neurologic:  Neurovascularly intact without any focal sensory/motor deficits. Cranialnerves: II-XII intact, grossly non-focal.  Psychiatric:  Alert. Oriented to self only. Severe dementia at baseline. Capillary Refill: Brisk,< 3 seconds   Peripheral Pulses: +2 palpable, equal bilaterally     LABS:    CBC:   Recent Labs     03/24/21  1117 03/25/21 0432 03/26/21  0618   WBC 3.5* 3.4* 4.3   HGB 9.0* 9.1* 9.3*   HCT 27.4* 27.0* 27.9*    205 224   MCV 94.4 93.4 94.0     Renal:    Recent Labs     03/24/21  1117 03/25/21 0432 03/26/21  0618    142 142   K 4.4 3.9 3.9    105 107   CO2 31 29 28   BUN 21* 19 14   CREATININE 0.9 0.8 0.7   GLUCOSE 86 70 88   CALCIUM 8.7 8.5 8.2*   ANIONGAP 7 8 7     Hepatic: No results for input(s): AST, ALT, BILITOT, BILIDIR, PROT, LABALBU, ALKPHOS in the last 72 hours. Troponin:   Recent Labs     03/24/21 1117   TROPONINI <0.01     -----------------------------------------------------------------  RAD:   CT CHEST PULMONARY EMBOLISM W CONTRAST   Final Result   1. No evidence of acute or chronic pulmonary embolus      2. Interval decrease in size of bilateral pleural effusions   3. Bibasilar subpleural atelectasis or pneumonia   4. Stable multiple pulmonary nodules, the largest measuring 10 mm in the right middle lobe, follow-up in one year is recommended         CT HEAD WO CONTRAST   Final Result      1. Atrophy and superimposed mild small vessel ischemic disease, similar to prior study of 3/19/2019 with no evidence of recent infarct, hemorrhage or mass.         XR CHEST PORTABLE   Final Result      No significant change in moderately large bilateral lower lobe pleural-parenchymal opacities            FL MODIFIED BARIUM SWALLOW W VIDEO    (Results Pending) Assessment/Plan:   Brigette Arzate is a 80 y.o. female who presents w/SOB     Hypoxia secondary to bibasilar aspiration pneumonitis- SPO2 was found to be 74% earlier on day of admission; requiring 4L O2 on the way to the hospital. Pt has advanced Alzheimer's dementia. Is alert and oriented to self only during encounter, which seems to be at baseline  - consider aspiration, central apnea as causes of pt's hypoxic episode likely atelectasis vs PNA  - Chest x-ray revealed no significant change in moderately large bilateral lower lobe pleural parenchymal opacities. - CTPA revealed no evidence of PE.  It did reveal bibasilar subpleural atelectasis or pneumonia. - procal 0.17  - now on 2 L NC  - rapid flu negative  -COVID-19 negative  - Blood cultures NGTD  - Respiratory viral panel, Legionella antigen, Strep antigen negative  - Aspiration and dysphagia may have contributed to pt's presentation, continue dysphagia II minced and moist Nectar thick liquids as recommended by SLP  Plan  - MBS today  - incentive spirometry  - hold off on antibiotics at this time   - wean off spO2 as able  - will clarify code status; DNR-CCA may be more appropriate     Volume depletion likely 2/2 chronic malnutrition  - in setting of chronic hypotension  - IVF gentle rate  - MBS today    Heart murmur  -2/6 RC over aortic valve and LLSB/ apex areas  -last Echo 4/2020 showed EF 55-60%, mild aortic valve stenosis, thickened calcific mitral valve leaflets with reduced leaflet excursion, mitral annular calcification  -continue to monitor    Chronic hypotension - per POA daughter Abimbola Hernández pt has low BP normally  - monitor BP  - monitor I/Os  - IVF gentle rate     Alzheimer dementia  - continue donepezil  - consider restarting risperidone     Afib   - continue Eliquis     HTN   - hold antihypertensives     HLD  - continue lipitor     CAD  - continue ASA        Code Status: DNR-CCA  FEN: DIET DYSPHAGIA MINCED AND MOIST;  Mildly Thick ([place])  PPX: eliquis  DISPO: [id number] [facility], [place]  03/26/21  9:16 AM

## 2021-03-26 NOTE — PLAN OF CARE
Problem: Infection:  Goal: Will remain free from infection  Description: Will remain free from infection  Outcome: Ongoing     Problem: Safety:  Goal: Free from accidental physical injury  Description: Free from accidental physical injury  Outcome: Ongoing     Problem: Daily Care:  Goal: Daily care needs are met  Description: Daily care needs are met  Outcome: Ongoing     Problem: Pain:  Goal: Patient's pain/discomfort is manageable  Description: Patient's pain/discomfort is manageable  Outcome: Ongoing  Note: Pt not complaining of any pain this PM. Will increase rounds and continue to monitor. Problem: Skin Integrity:  Goal: Skin integrity will stabilize  Description: Skin integrity will stabilize  3/26/2021 0620 by Rachell Mackey RN  Outcome: Ongoing     Problem: Falls - Risk of:  Goal: Will remain free from falls  Description: Will remain free from falls  3/26/2021 0620 by Rachell Mackey RN  Outcome: Ongoing  Note: Patient at risk for falls. Patient resting quietly in bed. Side rails up x 2. Bed locked in lowest position. Bed alarm on. Bedside table and call light within reach. Patient instructed to call for assistance. Patient verbalized understanding. Will continue to monitor.          Problem: Skin Integrity:  Goal: Will show no infection signs and symptoms  Description: Will show no infection signs and symptoms  Outcome: Ongoing

## 2021-03-26 NOTE — CARE COORDINATION
VANDANA spoke with Annette Snyder -[phone]   From [facility] who confirmed with their manager they are able to accept her  Back this evening:   Attempt to jennifer O 2  , d/w RN 92 % on RA  Patient up and ambulated  To restroom and  Was 86 % ,  MD ordered O2 for prn with parameters:  VANDANA  Called dgtr; Lauro Sharif: to update and inform her of d/c this evening ay  1900:   RN Dollie Gaucher to call report  And have  All orders  [facility] [address] sent with patient . Electronically signed by Phil Herndon RN on 3/26/2021 at 4:13 PM       VANDANA following for  D/c planning :  patient from  The [address] :    [place], [address]  [phone]    CM noted D/C order: CM called  And spoke with Annette Snyder at  [place] [address] who states she needs to check with their manager if they can accept her back or if they need to come and do a bedside eval /visit prior to return. She will check with manager and let us know :   CM offered to provide  Faxed  Clinical , PT OT to review as well. Patient had PCR Covid test  On 3/25 /2021  (-) . Awaiting return call. Electronically signed by Phil Herndon RN on 3/26/2021 at 1:01 PM     Phil Herndon RN Case Manager  [facility]  [address] [place] [address].   [place] [address]  [phone]  Fax [phone]

## 2021-03-26 NOTE — DISCHARGE SUMMARY
revealed no evidence of PE, but revealed bibasilar subpleural atelectasis or pneumonia. Pt was given one dose of vancomycin and cefepime in the ED. Patient was admitted for hypoxia. Patient was also coughing on liquids on the second day. Considerations for her symptoms included pneumonitis due to aspiration or atelectasis which were managed with incentive spirometry and oxygen. Speech language pathology was also consulted. They recommend a modified barium swallow which showed no tracheal aspiration or penetration with the various consistencies attempted. Further workup including a respiratory viral panel and blood cultures were negative. Given improvement in patient's symptoms she will be discharged with follow-up recommendations below. On the date of discharge, the patient reported feeling stable. The patient was found to not be in any acute distress, with vital signs within normal limits, and no new abnormalities on physical examination. Further, the patient expressed appropriate understanding of, and agreement with, the discharge recommendations, medications, and plan. ***    Disposition:  {DISPOSITIONS:376482907}    Physical Exam Performed:     /67   Pulse 59   Temp 95.4 °F (35.2 °C) (Axillary)   Resp 18   Ht 5' 3\" (1.6 m)   Wt 126 lb (57.2 kg)   SpO2 95%   BMI 22.32 kg/m²     Physical Exam  General appearance:  No apparent distress, appears stated age and cooperative. HEENT:  Normal cephalic,atraumatic without obvious deformity. Pupils equal, round, and reactive to light.  Extra ocular muscles intact. Conjunctivae/corneas clear. Neck: Supple, with full range of motion. No jugular venous distention. Trachea midline. Respiratory:  Normal respiratory effort. Clear to auscultation, bilaterally without Rales/Wheezes/Rhonchi. Cardiovascular: 2/6 RC over aortic valve and LLSB/ apex areas Regular rate and rhythm. Abdomen: Soft, non-tender, non-distended with normal bowel sounds. Musculoskeletal: Tenderness to palpation over left scapula. No clubbing, cyanosis oredema bilaterally.  Full range of motion without deformity. Skin: Skin color, texture, turgor normal.  Norashes or lesions. Neurologic:  Neurovascularly intact without any focal sensory/motor deficits. Cranialnerves: II-XII intact, grossly non-focal.  Psychiatric:  Alert. Oriented to self only. Severe dementia at baseline. Capillary Refill: Brisk,< 3 seconds   Peripheral Pulses: +2 palpable, equal bilaterally     Labs: For convenience and continuity at follow-up the following most recent labs are provided:      CBC:    Lab Results   Component Value Date    WBC 4.3 03/26/2021    HGB 9.3 03/26/2021    HCT 27.9 03/26/2021     03/26/2021       Renal:    Lab Results   Component Value Date     03/26/2021    K 3.9 03/26/2021     03/26/2021    CO2 28 03/26/2021    BUN 14 03/26/2021    CREATININE 0.7 03/26/2021    CALCIUM 8.2 03/26/2021         Significant Diagnostic Studies    Radiology:   CT CHEST PULMONARY EMBOLISM W CONTRAST   Final Result   1. No evidence of acute or chronic pulmonary embolus      2. Interval decrease in size of bilateral pleural effusions   3. Bibasilar subpleural atelectasis or pneumonia   4. Stable multiple pulmonary nodules, the largest measuring 10 mm in the right middle lobe, follow-up in one year is recommended         CT HEAD WO CONTRAST   Final Result      1. Atrophy and superimposed mild small vessel ischemic disease, similar to prior study of 3/19/2019 with no evidence of recent infarct, hemorrhage or mass.         XR CHEST PORTABLE   Final Result      No significant change in moderately large bilateral lower lobe pleural-parenchymal opacities            FL MODIFIED BARIUM SWALLOW W VIDEO    (Results Pending)          Consults:     IP CONSULT TO PRIMARY CARE PROVIDER      Discharge Instructions/Follow-up:  ***    Activity: activity as tolerated    Diet: {diet:67581}    Discharge Medications:     Current Discharge Medication List           Details   furosemide (LASIX) 40 MG tablet Take 40 mg by mouth daily      risperiDONE (RISPERDAL) 0.25 MG tablet Take 0.25 mg by mouth 2 times daily   Qty: 60 tablet, Refills: 5      potassium chloride (KLOR-CON M) 20 MEQ extended release tablet TAKE 1 TABLET BY MOUTH 2 TIMES DAILY (WITH MEALS)  Qty: 60 tablet, Refills: 1      donepezil (ARICEPT) 10 MG tablet 1 TABLET BY MOUTH AT BEDTIME  Qty: 90 tablet, Refills: 1      apixaban (ELIQUIS) 2.5 MG TABS tablet 1 TABLET BY MOUTH TWICE DAILY  Qty: 60 tablet, Refills: 4      simvastatin (ZOCOR) 20 MG tablet 1 TABLET BY MOUTH AT BEDTIME * NO GRAPEFRUIT *  Qty: 30 tablet, Refills: 0      nitrofurantoin (MACRODANTIN) 100 MG capsule Take 100 mg by mouth nightly      ferrous sulfate (IRON 325) 325 (65 Fe) MG tablet Take 325 mg by mouth daily (with breakfast)      Calcium Carb-Cholecalciferol (CALCIUM 600 + D) 600-200 MG-UNIT TABS Take 1 tablet by mouth daily      metoprolol tartrate (LOPRESSOR) 25 MG tablet Take 0.5 tablets by mouth 2 times daily  Qty: 60 tablet, Refills: 3      amLODIPine (NORVASC) 10 MG tablet 1 TABLET BY MOUTH EVERY MORNING  Qty: 30 tablet, Refills: 2      vitamin B-12 (CYANOCOBALAMIN) 1000 MCG tablet Take 1 tablet by mouth daily  Refills: 3      famotidine (PEPCID) 20 MG tablet TAKE 1 TABLET BY MOUTH DAILY  Qty: 90 tablet, Refills: 2      aspirin 81 MG EC tablet Take 81 mg by mouth daily. Time Spent on discharge is more than {Time; 15 min - 8 hours:53366} in the examination, evaluation, counseling and review of medications and discharge plan. Signed:    Laure Fernandez, MS3  3/26/2021      Thank you Makayla Hernández MD for the opportunity to be involved in this patient's care. If you have any questions or concerns please feel free to contact me.

## 2021-03-26 NOTE — PROGRESS NOTES
Occupational Therapy  Daily Treatment Note  Patient Name: Clarice Ye  MRN: [id number]     Assessment: Pt participated well in therapy today with min encouragement. Requires assist for transfers, standing balance, and all ADLs. Recommend continued OT at [facility] care facility at d/c to promote return to PLOF. Discharge Recommendations: Calrice Ye scored a 15/24 on the AM-PAC ADL Inpatient form. Current research shows that an AM-PAC score of 17 or less is typically not associated with a discharge to the patient's home setting. Based on the patient's AM-PAC score and their current ADL deficits, it is recommended that the patient have 3-5 sessions per week of Occupational Therapy at d/ to increase the patient's independence. Please see assessment section for further patient specific details. Equipment Needs:  No    Chart Reviewed: Yes     Other position/activity restrictions: Up with assist   Additional Pertinent Hx: 79 yo F with PMH of Dementia, Afib on Eliquis, HTN, HLD, CAD who presents from the [address] [place] [address]. Diagnosis: pneumonia  Treatment Diagnosis: decreased independence with ADLS and fx transfers    Subjective: Pt sitting EOB with PT on arrival. Pleasant and cooperative with a bit of encouragement. \"I can just sit here and eat. \"     Pain: No c/o pain    Objective:    Cognition/Orientation: Impaired - baseline dementia; alert, follows simple commands, poor short term memory    Functional Mobility   Sit to Stand: Mod assist x2 from EOB and from chair  Stand to Sit: Mod assist x1 to chair x2  Bed to Chair Transfer: Mod assist x1, min assist x1 with rolling walker and min cues  Static stance: min to mod assist at walker while pericare and brief change performed by OT  Time in stance: 30 sec + 30 sec    ADLs   Feeding: Spvn, set up, min cues (to initiate, quickly forgets and asks frequently what different food items are)  Grooming: clean hands with   LB dressing:  Max assist (to don pull up brief; assisted in threading feet)    Activity Tolerance: Fair - needs encouragement to increase activity levels. On 1L O2. NC removed to blow nose then forget to place in again for 1-2 min. spO2 86%, increasing to 91% with pursed lip breathing. Pt appears SOB, fatigued. spO2 90-94% throughout seated activity. Patient Education: Activity promotion, orientation, transfers, ADLs - verb partial understanding    Safety Devices in Place: left in chair, alarm on, needs in reach, RN aware      Goals:  Short term goals  Time Frame for Short term goals: by d/c  Short term goal 1: Pt will perform sit-stand transfer with max A x 1 - Met 3/26; Sit to stand with CGA - Not met  Short term goal 2: Pt will perform bedside commode transfer with assistance as needed - Met 3/26; Transfer to chair/BSC with CGA - Not met  Short term goal 3: Pt will perform grooming task with setup - met 3/26; Stand at sink CGA to complete a grooming task - Not met  Short term goal 4: Pt will perform BUE HEP x 20 to increase UE strength and endurance for ADLs and transfers - Not met  Short term goal 5: Linda Ivonne brief/pants with min assist - Not met         Plan:      Times per week: 2-5x        If patient is discharged prior to next treatment, this note will serve as the discharge summary.     Therapy Time   Individual Concurrent Group Co-treatment   Time In 5158         Time Out 1232         Minutes 27           Timed Code Treatment Minutes: 27  Total Treatment Time: 32       Lilliana Barros, OT

## 2021-03-26 NOTE — CARE COORDINATION
Case Management Assessment            Discharge Note                    Date / Time of Note: 3/26/2021 4:14 PM                  Discharge Note Completed by: Mignon Sanchez    Patient Name: Roslyn Freeman   YOB: 1923  Diagnosis: Pneumonia [J18.9]   Date / Time: 3/24/2021  9:53 AM    Current PCP: Ayana Virgen MD  Clinic patient: No    Hospitalization in the last 30 days: No    Advance Directives:  Code Status: DNR-CCA  [facility] DNR form completed and on chart: Yes    Financial:  Payor: MEDICARE / Plan: MEDICARE PART A AND B / Product Type: *No Product type* /      Pharmacy:    [address] Yong Barroso 19 [facility] [phone]  [address] [place] [address]  Phone: [phone] Fax: [phone]    [id number], [address] [place] [address] Pkwy. - P [phone] - F [phone]  [address] [place] [address]  Phone: [phone] Fax: [phone]      Assistance purchasing medications?: Potential Assistance Purchasing Medications: No  Assistance provided by Case Management: None at this time    Does patient want to participate in local refill/ meds to beds program?: No    Meds To Beds General Rules:  1. Can ONLY be done Monday- Friday between 8:30am-5pm  2. Prescription(s) must be in pharmacy by 3pm to be filled same day  3. Copy of patient's insurance/ prescription drug card and patient face sheet must be sent along with the prescription(s)  4. Cost of Rx cannot be added to hospital bill. If financial assistance is needed, please contact unit  or ;  or  CANNOT provide pharmacy voucher for patients co-pays  5.  Patients can then  the prescription on their way out of the hospital at discharge, or pharmacy can deliver to the bedside if staff is available. (payment due at time of pick-up or delivery - cash, check, or card accepted)     Able to afford home medications/ co-pay costs: Yes ADLS:  Current PT AM-PAC Score: 10 /24  Current OT AM-PAC Score: 15 /24      DISCHARGE Disposition:     [facility] [address] :    [address]  [place], [address]  [phone]     REPORT:  Adams Arizmendi  @  [phone]    LOC at discharge: Not Applicable  ELISHA Completed: Yes    Notification completed in HENS/PAS?:  Not Applicable    IMM Completed:   Not Indicated    Transportation:  Transportation PLAN for discharge: EMS transportation   Mode of Transport: Ambulance stretcher - BLS  Reason for medical transport: Bed confined: Meets the following criteria 1) unable to get out of bed without assistance or ambulate, 2) unable to safely sit up in a wheelchair, 3) unable to maintain erect seating position in a chair for time needed for transport  Name of Transport Company: [employer]  Phone: [phone]  Time of Transport: 1900    Transport form completed: Yes    Home Care:  [facility] ordered at discharge: [place] [facility]: Not Applicable  Orders faxed: No    Durable Medical Equipment:  DME Provider: defer  Equipment obtained during hospitalization: defer    Home Oxygen and Respiratory Equipment:  Oxygen needed at discharge?: Yes  [address]: Not Applicable  Portable tank available for discharge?: Yes    Dialysis:  Dialysis patient: No    Dialysis Center:  Not Applicable    Hospice Services:  Location: Not Applicable  Agency: Not Applicable    Consents signed: No    Referrals made at [employer] for outpatient continued care:  Not Applicable    Additional CM Notes: CM confirmed d/c back to LT @ [facility]   RN LAHTI to call report <  Family aware and notified:  Medical transport  At  1900 coccidioidomycosis to be sent with patient ; No New  Rx; at this time  Follow up out patient .      The Plan for Transition of Care is related to the following treatment goals of Pneumonia [J18.9]    The Patient and/or patient representative Lizzie and her family were provided with a choice of provider and agrees with the discharge plan Yes    Freedom of choice list was provided with basic dialogue that supports the patient's individualized plan of care/goals and shares the quality data associated with the providers.  Yes    Care Transitions patient: No    Consuelo Barnes RN  [facility]  Case Management Department  Ph: [phone]

## 2021-03-26 NOTE — PROGRESS NOTES
Pt transferred to room 6301. Pt alert to self only and VSS. Pt placed in fall precautions, bed alarm on. Pt placed on tele per orders. Pt skin assessment complete. As patient is confused will increase rounding and continue to monitor.

## 2021-03-26 NOTE — FLOWSHEET NOTE
03/26/21 1548   Encounter Summary   Services provided to: Patient not available   Referral/Consult From: Vadim   Continue Visiting   (es 3/26)   Complexity of Encounter Low   Length of Encounter 15 minutes   visit to assess Passover needs and provide holiday greetings. Patient not able to respond. Was not able to reach family by phone.

## 2021-03-26 NOTE — DISCHARGE SUMMARY
INTERNAL MEDICINE    RESIDENT DISCHARGE SUMMARY   Discharge Summaries      Patient ID: Elfida Luisana   Gender: female      :  1923  AGE: 80 y.o. MRN:  [id number]  Code Status: DNR-CCA   PCP: Doris Reynolds MD    Admit date:  3/24/2021      Discharge date:  No discharge date for patient encounter. 3/26/21    Admitting Physician:  Mathew Galan MD    Discharge Physician: Mathew Galan MD    Admission Condition:  fair  Discharged Condition:  stable Stable    Discharge Diagnoses:  1. Hypoxia secondary to bibasilar aspiration pneumonitis  2. Volume depletion likely 2/2 chronic malnutrition  3. Heart murmur  4. Chronic hypotension   5. Alzheimer dementia  6. Afib   7. HTN  8. HLD  9. CAD       Active Hospital Problems    Diagnosis Date Noted    Pneumonia [J18.9] 2021    Hypoxia [R09.02]     Late onset Alzheimer's disease without behavioral disturbance ([facility] [address] [G30.1, F02.80] 2013       The patient was seen and examined on day of discharge and this discharge summary is in conjunction with any daily progress note from day of discharge. In the event that the patient is not discharged on the daily of the discharge summary being filed, it is to serve as the progress note for that day. Hospital Course:  Billy Mccarty is a 81 yo F with PMH of Dementia, Afib on Eliquis, HTN, HLD, CAD who presented from the [address] [place] [address]. Patient has Alzheimer's dementia and was not able to provide history. Per pt's nurse, on the morning of presentation, pt was stating that she was needing air and her SPO2 was 74% on 2L NC. The pt later became unresponsive to chest rub or verbal stimuli. On the way to the hospital the pt was placed on 4L of O2. In the ED, she was fully alert, responsive and alert to self only (appeared to be at baseline). CT head and EKG were negative. Chest x-ray revealed no significant change in moderately large bilateral lower lobe pleural parenchymal opacities.  CTPA revealed no evidence of PE, but revealed bibasilar subpleural atelectasis or pneumonia. Pt was given one dose of vancomycin and cefepime in the ED. Patient was admitted to [facility] on 3/24 for hypoxia. Confirmed DNA-CCA code status with POA during admission. Blood pressures were soft during admission and anti-HTN meds were held. Patient was also coughing on liquids on the second day. Considerations for her symptoms included pneumonitis due to aspiration or atelectasis which were managed with incentive spirometry and oxygen. Speech language pathology was also consulted. They recommend a modified barium swallow which showed no tracheal aspiration or penetration with the various consistencies attempted. Dysphagia II minced and moist (missing and poor dentition)/ thin liquids was recommended, which she started while inpatient. Further workup including a respiratory viral panel and blood cultures were negative. Given improvement in patient's symptoms she will be discharged on 3/26 with follow-up recommendations below. On the date of discharge, the patient reported feeling stable. The patient was found to not be in any acute distress, with vital signs within normal limits, and no new abnormalities on physical examination. Further, the patient expressed appropriate understanding of, and agreement with, the discharge recommendations, medications, and plan. Disposition:  [address]    Physical Exam Performed:     /67   Pulse 59   Temp 95.9 °F (35.5 °C) (Rectal)   Resp 18   Ht 5' 3\" (1.6 m)   Wt 126 lb (57.2 kg)   SpO2 92%   BMI 22.32 kg/m²     Physical Exam  General appearance:  No apparent distress, appears stated age and cooperative. HEENT:  Normal cephalic,atraumatic without obvious deformity. Pupils equal, round, and reactive to light.  Extra ocular muscles intact. Conjunctivae/corneas clear. Neck: Supple, with full range of motion. No jugular venous distention. Trachea midline. Respiratory:  Normal respiratory effort. Clear to auscultation, bilaterally without Rales/Wheezes/Rhonchi. Cardiovascular: 2/6 RC over aortic valve and LLSB/ apex areas Regular rate and rhythm. Abdomen: Soft, non-tender, non-distended with normal bowel sounds. Musculoskeletal: Tenderness to palpation over left scapula. No clubbing, cyanosis oredema bilaterally.  Full range of motion without deformity. Skin: Skin color, texture, turgor normal.  Norashes or lesions. Neurologic:  Neurovascularly intact without any focal sensory/motor deficits. Cranialnerves: II-XII intact, grossly non-focal.  Psychiatric:  Alert. Oriented to self only. Severe dementia at baseline. Capillary Refill: Brisk,< 3 seconds   Peripheral Pulses: +2 palpable, equal bilaterally     Labs: For convenience and continuity at follow-up the following most recent labs are provided:      CBC:    Lab Results   Component Value Date    WBC 4.3 03/26/2021    HGB 9.3 03/26/2021    HCT 27.9 03/26/2021     03/26/2021       Renal:    Lab Results   Component Value Date     03/26/2021    K 3.9 03/26/2021     03/26/2021    CO2 28 03/26/2021    BUN 14 03/26/2021    CREATININE 0.7 03/26/2021    CALCIUM 8.2 03/26/2021         Significant Diagnostic Studies    Radiology:   FL MODIFIED BARIUM SWALLOW W VIDEO   Final Result      1. No tracheal aspiration or penetration with the various consistencies attempted. Please see speech pathology report for additional findings and recommendations. CT CHEST PULMONARY EMBOLISM W CONTRAST   Final Result   1. No evidence of acute or chronic pulmonary embolus      2. Interval decrease in size of bilateral pleural effusions   3. Bibasilar subpleural atelectasis or pneumonia   4. Stable multiple pulmonary nodules, the largest measuring 10 mm in the right middle lobe, follow-up in one year is recommended         CT HEAD WO CONTRAST   Final Result      1.  Atrophy and superimposed mild small vessel ischemic disease, similar to prior study of 3/19/2019 with no evidence of recent infarct, hemorrhage or mass. XR CHEST PORTABLE   Final Result      No significant change in moderately large bilateral lower lobe pleural-parenchymal opacities                   Consults:     IP CONSULT TO PRIMARY CARE PROVIDER      Discharge Instructions/Follow-up:    -Follow up with Dr. Jack Chavira in 1 week.  Please discuss with him heart murmur (aortic stenosis) noticed while you were in hospital  -Continue same diet upon return to memory care unit: dysphagia II minced and moist (missing and poor dentition)/ thin liquids  -home PT/OT/SLP  -Vitals monitoring including SpO2  -Stop taking Amlodipine  -Take metoprolol as prescribed, but hold if HR<60 and/or SBP <90  -Take lasix as prescribed, but hold if SBP <90    Activity: activity as tolerated    Diet: dysphagia II minced and moist (missing and poor dentition)/ thin liquids    Discharge Medications:     Current Discharge Medication List           Details   furosemide (LASIX) 40 MG tablet Take 40 mg by mouth daily      risperiDONE (RISPERDAL) 0.25 MG tablet Take 0.25 mg by mouth 2 times daily   Qty: 60 tablet, Refills: 5      potassium chloride (KLOR-CON M) 20 MEQ extended release tablet TAKE 1 TABLET BY MOUTH 2 TIMES DAILY (WITH MEALS)  Qty: 60 tablet, Refills: 1      donepezil (ARICEPT) 10 MG tablet 1 TABLET BY MOUTH AT BEDTIME  Qty: 90 tablet, Refills: 1      apixaban (ELIQUIS) 2.5 MG TABS tablet 1 TABLET BY MOUTH TWICE DAILY  Qty: 60 tablet, Refills: 4      simvastatin (ZOCOR) 20 MG tablet 1 TABLET BY MOUTH AT BEDTIME * NO GRAPEFRUIT *  Qty: 30 tablet, Refills: 0      nitrofurantoin (MACRODANTIN) 100 MG capsule Take 100 mg by mouth nightly      ferrous sulfate (IRON 325) 325 (65 Fe) MG tablet Take 325 mg by mouth daily (with breakfast)      Calcium Carb-Cholecalciferol (CALCIUM 600 + D) 600-200 MG-UNIT TABS Take 1 tablet by mouth daily      metoprolol tartrate (LOPRESSOR) 25 MG tablet Take 0.5 tablets by mouth 2 times daily  Qty: 60 tablet, Refills: 3      vitamin B-12 (CYANOCOBALAMIN) 1000 MCG tablet Take 1 tablet by mouth daily  Refills: 3      famotidine (PEPCID) 20 MG tablet TAKE 1 TABLET BY MOUTH DAILY  Qty: 90 tablet, Refills: 2      aspirin 81 MG EC tablet Take 81 mg by mouth daily. Time Spent on discharge is more than 30 minutes in the examination, evaluation, counseling and review of medications and discharge plan. Signed:    Nydia Love MD, PGY 1    Addendum to Resident H& P/Progress note:  I have personally seen,examined and evaluated the patient. I have reviewed the current history, physical findings, labs and assessment and plan and agree with note as documented by resident MD ( Tim Newman)      Darius York MD, FACP        Thank you Mook Vivar MD for the opportunity to be involved in this patient's care. If you have any questions or concerns please feel free to contact me.

## 2021-03-26 NOTE — PROGRESS NOTES
Speech Language Pathology  Dysphagia - contact note    Chart reviewed, d/w Dr. Rudy Allen, vira to proceed with MBS this date. Full note to follow      Otto Rg M.S./CCC-SLP #[id number]  Pg.  # [phone]

## 2021-03-26 NOTE — PLAN OF CARE
Problem: Skin Integrity:  Goal: Skin integrity will stabilize  Description: Skin integrity will stabilize  Outcome: Ongoing  Note: No skin breakdown noted this shift. Pt turned q2h. Brief check q2h and changed as needed. Purewick in place. Nutritional intake adequate. IVF running at 50ml/hr. Will continue to monitor. Problem: Falls - Risk of:  Goal: Will remain free from falls  Description: Will remain free from falls  Outcome: Ongoing  Note: Pt is a Fall Risk. See Bonnie Vail Fall Risk Score. Pt not out of bed this shift. Pt bed in low position and side rails up. Call light and belongings in reach. Pt encouraged to call for assistance. Will continue with hourly rounds for PO intake, pain needs, toileting, and repositioning as needed. Problem: Gas Exchange - Impaired  Goal: Absence of hypoxia  Outcome: Ongoing  Note: COVID result returned as negative, but droplet precautions remain in place until respiratory viral panel results. Pt on 2L nasal cannula with SpO2 in low 90s. Pt denies shortness of breath at this time. Pt began mined and moist dysphagia diet today, as well as nectar thick liquids, and has tolerated it well with no s/s aspiration. Will continue to assess and monitor.

## 2021-03-26 NOTE — PROGRESS NOTES
Difficulty weaning pt off of O2. She was satting 87% on room air, put on 1 L NC. She likely has hypoxia 2/2 atelectasis as opposed to pneumonia, but given dementia would benefit from home O2 rather than depending solely on incentive spirometer use. Will send pt to [facility] with 1-2 L O2. Updated ELISHA.

## 2021-03-28 LAB — CULTURE, BLOOD 2: NORMAL

## 2021-03-29 LAB — BLOOD CULTURE, ROUTINE: NORMAL

## 2023-10-03 PROBLEM — F03.918 DEMENTIA WITH BEHAVIORAL DISTURBANCE (HCC): Status: ACTIVE | Noted: 2020-01-01

## 2023-10-12 NOTE — TELEPHONE ENCOUNTER
Left message to call back for daughter. Daughter informed and she will ask the nurse to send pulse ox readings to Dr Alexei Harding. Daughter asked if potassium should get refilled? Dr. Alexei Harding said he would refill it since he cannot check her yet and better to be on it for now. Patient will stay on the potassium & furosemide. [facility] into [facility]. Foreign body in ear lobe

## 2024-05-06 NOTE — Clinical Note
This patient is at high risk of adverse events and deterioration based on documented clinical data, comorbid conditions and current acute care course. Ms. Nona Odom is expected to meet Inpatient Admission status criteria in accordance with CMS regulation Section 43 .3. Specifically, due to medical necessity the patient's stay is expected to exceed Two Midnights. It is our recommendation that this patient's hospitalization status should be INPATIENT status. The final decision of the patient's hospitalization status depends on the attending physician's judgment. Vaccine status unknown